# Patient Record
Sex: FEMALE | Race: WHITE | NOT HISPANIC OR LATINO | Employment: OTHER | URBAN - METROPOLITAN AREA
[De-identification: names, ages, dates, MRNs, and addresses within clinical notes are randomized per-mention and may not be internally consistent; named-entity substitution may affect disease eponyms.]

---

## 2021-05-06 LAB
APPEARANCE: CLEAR
BILIRUBIN, URINE: NEGATIVE
BLOOD, URINE: ABNORMAL
COLOR, URINE: YELLOW
GLUCOSE, URINE: ABNORMAL MG/DL
KETONES, URINE: ABNORMAL MG/DL
LEUKOCYTE ESTERASE, URINE: NEGATIVE
NITRITE, URINE: NEGATIVE
PH UA: 5 (ref 5–8)
PROTEIN UA: ABNORMAL MG/DL
RBC URINE: ABNORMAL /HPF (ref 0–5)
SPECIFIC GRAVITY, URINE: 1.02 (ref 1–1)
SPECIMEN SOURCE: ABNORMAL
SPECIMEN SOURCE: ABNORMAL
TROPONIN I: 0.14 NG/ML (ref 0–0)
TROPONIN I: 0.24 NG/ML (ref 0–0)
UROBILINOGEN, URINE: <2 MG/DL (ref 0–1.9)
WBC URINE: ABNORMAL /HPF (ref 0–5)

## 2021-05-10 LAB
ANION GAP SERPL CALCULATED.3IONS-SCNC: 11 MMOL/L (ref 10–20)
BICARBONATE: 23 MMOL/L (ref 21–32)
CALCIUM SERPL-MCNC: 8.3 MG/DL (ref 8.6–10.3)
CHLORIDE BLD-SCNC: 106 MMOL/L (ref 98–107)
CREAT SERPL-MCNC: 1.88 MG/DL (ref 0.5–1)
GFR AFRICAN AMERICAN: 30 ML/MIN/1.73M2
GFR NON-AFRICAN AMERICAN: 25 ML/MIN/1.73M2
GLUCOSE: 157 MG/DL (ref 74–99)
POTASSIUM SERPL-SCNC: 4.2 MMOL/L (ref 3.5–5.3)
SODIUM BLD-SCNC: 136 MMOL/L (ref 136–145)
UREA NITROGEN: 38 MG/DL (ref 6–23)

## 2023-07-06 RX ORDER — DILTIAZEM HYDROCHLORIDE 120 MG/1
CAPSULE, COATED, EXTENDED RELEASE ORAL
Qty: 90 CAPSULE | Refills: 0 | OUTPATIENT
Start: 2023-07-06

## 2024-01-04 DIAGNOSIS — I48.91 ATRIAL FIBRILLATION, UNSPECIFIED TYPE (MULTI): ICD-10-CM

## 2024-01-04 RX ORDER — ROSUVASTATIN CALCIUM 10 MG/1
10 TABLET, COATED ORAL DAILY
COMMUNITY
Start: 2021-10-06 | End: 2024-02-06 | Stop reason: WASHOUT

## 2024-01-04 RX ORDER — ACETAMINOPHEN 500 MG
2000 TABLET ORAL 2 TIMES DAILY
COMMUNITY
Start: 2019-08-05 | End: 2024-02-06 | Stop reason: WASHOUT

## 2024-01-04 RX ORDER — FOLIC ACID 20 MG
CAPSULE ORAL
COMMUNITY
End: 2024-02-06 | Stop reason: WASHOUT

## 2024-01-04 RX ORDER — FUROSEMIDE 40 MG/1
40 TABLET ORAL DAILY
COMMUNITY
End: 2024-02-06 | Stop reason: WASHOUT

## 2024-01-04 RX ORDER — DORZOLAMIDE HYDROCHLORIDE AND TIMOLOL MALEATE 20; 5 MG/ML; MG/ML
1 SOLUTION/ DROPS OPHTHALMIC 2 TIMES DAILY
COMMUNITY

## 2024-01-04 RX ORDER — CYCLOBENZAPRINE HCL 5 MG
5 TABLET ORAL 2 TIMES DAILY PRN
COMMUNITY
Start: 2023-05-23 | End: 2024-02-06 | Stop reason: WASHOUT

## 2024-01-04 RX ORDER — RIVAROXABAN 10 MG/1
10 TABLET, FILM COATED ORAL DAILY
COMMUNITY
End: 2024-01-04 | Stop reason: SDUPTHER

## 2024-01-04 RX ORDER — INSULIN ASPART 100 [IU]/ML
INJECTION, SUSPENSION SUBCUTANEOUS
COMMUNITY
Start: 2016-11-09

## 2024-01-04 RX ORDER — BRIMONIDINE TARTRATE 2 MG/ML
1 SOLUTION/ DROPS OPHTHALMIC 2 TIMES DAILY
COMMUNITY

## 2024-01-04 RX ORDER — LATANOPROST 50 UG/ML
SOLUTION/ DROPS OPHTHALMIC
COMMUNITY
Start: 2016-02-08

## 2024-01-04 RX ORDER — CLOPIDOGREL BISULFATE 75 MG/1
1 TABLET ORAL DAILY
COMMUNITY
End: 2024-02-06 | Stop reason: WASHOUT

## 2024-01-04 RX ORDER — RIVAROXABAN 10 MG/1
10 TABLET, FILM COATED ORAL DAILY
Qty: 90 TABLET | Refills: 3 | Status: SHIPPED | OUTPATIENT
Start: 2024-01-04

## 2024-01-04 RX ORDER — AMIODARONE HYDROCHLORIDE 200 MG/1
200 TABLET ORAL DAILY
COMMUNITY
End: 2024-02-06 | Stop reason: WASHOUT

## 2024-01-04 RX ORDER — LOSARTAN POTASSIUM 50 MG/1
50 TABLET ORAL DAILY
COMMUNITY
Start: 2023-11-05 | End: 2024-02-06 | Stop reason: WASHOUT

## 2024-01-04 RX ORDER — DILTIAZEM HYDROCHLORIDE 120 MG/1
120 CAPSULE, COATED, EXTENDED RELEASE ORAL DAILY
COMMUNITY
End: 2024-02-06 | Stop reason: WASHOUT

## 2024-01-04 NOTE — TELEPHONE ENCOUNTER
Last seen 02/07/23. Medication last renewed on 01/06/23 with a year supply. Upcoming appointment on 02/06/24.

## 2024-01-09 PROBLEM — I25.10 CAD (CORONARY ARTERY DISEASE): Status: ACTIVE | Noted: 2024-01-09

## 2024-01-09 PROBLEM — R60.0 BILATERAL EDEMA OF LOWER EXTREMITY: Status: ACTIVE | Noted: 2024-01-09

## 2024-01-09 PROBLEM — H31.411: Status: ACTIVE | Noted: 2024-01-09

## 2024-01-09 PROBLEM — I42.9 CARDIOMYOPATHY (MULTI): Status: ACTIVE | Noted: 2024-01-09

## 2024-01-09 PROBLEM — D64.9 ANEMIA: Status: ACTIVE | Noted: 2024-01-09

## 2024-01-09 PROBLEM — R26.9 ABNORMALITY OF GAIT AND MOBILITY: Status: ACTIVE | Noted: 2024-01-09

## 2024-01-09 PROBLEM — I50.32 CHRONIC DIASTOLIC HEART FAILURE (MULTI): Status: ACTIVE | Noted: 2024-01-09

## 2024-01-09 PROBLEM — C44.310 BASAL CELL CARCINOMA (BCC) OF FACE: Status: ACTIVE | Noted: 2024-01-09

## 2024-01-09 PROBLEM — D04.39 CARCINOMA IN SITU OF SKIN OF OTHER PARTS OF FACE: Status: ACTIVE | Noted: 2020-04-28

## 2024-01-09 PROBLEM — R63.5 ABNORMAL WEIGHT GAIN: Status: ACTIVE | Noted: 2024-01-09

## 2024-02-01 PROBLEM — R00.1 SINUS BRADYCARDIA: Status: ACTIVE | Noted: 2024-02-01

## 2024-02-01 PROBLEM — D48.5 NEOPLASM OF UNCERTAIN BEHAVIOR OF SKIN: Status: ACTIVE | Noted: 2020-04-28

## 2024-02-01 PROBLEM — Z98.83 STATUS POST GLAUCOMA SURGERY: Status: ACTIVE | Noted: 2024-02-01

## 2024-02-01 PROBLEM — R33.9 URINARY RETENTION: Status: ACTIVE | Noted: 2024-02-01

## 2024-02-01 PROBLEM — E78.5 DYSLIPIDEMIA: Status: ACTIVE | Noted: 2024-02-01

## 2024-02-01 PROBLEM — R60.9 DEPENDENT EDEMA: Status: ACTIVE | Noted: 2024-02-01

## 2024-02-01 PROBLEM — H40.1131 PRIMARY OPEN ANGLE GLAUCOMA OF BOTH EYES, MILD STAGE: Status: ACTIVE | Noted: 2024-02-01

## 2024-02-01 PROBLEM — E11.9 DIABETES MELLITUS (MULTI): Status: ACTIVE | Noted: 2024-02-01

## 2024-02-01 PROBLEM — I73.9 PAD (PERIPHERAL ARTERY DISEASE) (CMS-HCC): Status: ACTIVE | Noted: 2024-02-01

## 2024-02-01 PROBLEM — Z98.890 HISTORY OF TRABECULECTOMY: Status: ACTIVE | Noted: 2024-02-01

## 2024-02-01 PROBLEM — R53.83 FATIGUE: Status: ACTIVE | Noted: 2024-02-01

## 2024-02-01 PROBLEM — I48.91 ATRIAL FIBRILLATION (MULTI): Status: ACTIVE | Noted: 2024-02-01

## 2024-02-01 PROBLEM — R79.89 LOW VITAMIN D LEVEL: Status: ACTIVE | Noted: 2024-02-01

## 2024-02-01 PROBLEM — I25.2 HISTORY OF NON-ST ELEVATION MYOCARDIAL INFARCTION (NSTEMI): Status: ACTIVE | Noted: 2024-02-01

## 2024-02-01 PROBLEM — L57.9 SKIN CHANGES DUE TO CHRONIC EXPOSURE TO NONIONIZING RADIATION, UNSPECIFIED: Status: ACTIVE | Noted: 2020-04-28

## 2024-02-01 PROBLEM — E87.6 HYPOKALEMIA: Status: ACTIVE | Noted: 2024-02-01

## 2024-02-01 PROBLEM — L89.606 PRESSURE INJURY OF DEEP TISSUE OF HEEL: Status: ACTIVE | Noted: 2024-02-01

## 2024-02-01 PROBLEM — S72.001A HIP FRACTURE, RIGHT (MULTI): Status: ACTIVE | Noted: 2024-02-01

## 2024-02-01 PROBLEM — M47.816 OSTEOARTHRITIS OF LUMBAR SPINE: Status: ACTIVE | Noted: 2024-02-01

## 2024-02-01 PROBLEM — N18.4 STAGE 4 CHRONIC KIDNEY DISEASE (MULTI): Status: ACTIVE | Noted: 2024-02-01

## 2024-02-01 PROBLEM — I70.229 CRITICAL LOWER LIMB ISCHEMIA (MULTI): Status: ACTIVE | Noted: 2024-02-01

## 2024-02-01 PROBLEM — N95.0 POSTMENOPAUSAL VAGINAL BLEEDING: Status: ACTIVE | Noted: 2024-02-01

## 2024-02-01 PROBLEM — T07.XXXA MULTIPLE FRACTURES: Status: ACTIVE | Noted: 2024-02-01

## 2024-02-06 ENCOUNTER — OFFICE VISIT (OUTPATIENT)
Dept: CARDIOLOGY | Facility: CLINIC | Age: 89
End: 2024-02-06
Payer: MEDICARE

## 2024-02-06 VITALS
RESPIRATION RATE: 18 BRPM | OXYGEN SATURATION: 98 % | DIASTOLIC BLOOD PRESSURE: 66 MMHG | HEART RATE: 71 BPM | SYSTOLIC BLOOD PRESSURE: 116 MMHG | HEIGHT: 60 IN | BODY MASS INDEX: 25.78 KG/M2

## 2024-02-06 DIAGNOSIS — I25.10 CORONARY ARTERY DISEASE INVOLVING NATIVE CORONARY ARTERY OF NATIVE HEART WITHOUT ANGINA PECTORIS: ICD-10-CM

## 2024-02-06 DIAGNOSIS — I48.91 ATRIAL FIBRILLATION, UNSPECIFIED TYPE (MULTI): Primary | ICD-10-CM

## 2024-02-06 PROBLEM — S82.63XA CLOSED FRACTURE OF LATERAL MALLEOLUS: Status: ACTIVE | Noted: 2020-09-17

## 2024-02-06 PROBLEM — Z96.641 PRESENCE OF RIGHT ARTIFICIAL HIP JOINT: Status: ACTIVE | Noted: 2021-05-19

## 2024-02-06 PROCEDURE — 93000 ELECTROCARDIOGRAM COMPLETE: CPT | Performed by: NURSE PRACTITIONER

## 2024-02-06 PROCEDURE — 1159F MED LIST DOCD IN RCRD: CPT | Performed by: NURSE PRACTITIONER

## 2024-02-06 PROCEDURE — 99214 OFFICE O/P EST MOD 30 MIN: CPT | Performed by: NURSE PRACTITIONER

## 2024-02-06 PROCEDURE — 1160F RVW MEDS BY RX/DR IN RCRD: CPT | Performed by: NURSE PRACTITIONER

## 2024-02-06 PROCEDURE — 3074F SYST BP LT 130 MM HG: CPT | Performed by: NURSE PRACTITIONER

## 2024-02-06 PROCEDURE — 1036F TOBACCO NON-USER: CPT | Performed by: NURSE PRACTITIONER

## 2024-02-06 PROCEDURE — 3078F DIAST BP <80 MM HG: CPT | Performed by: NURSE PRACTITIONER

## 2024-02-06 RX ORDER — ASPIRIN 81 MG/1
81 TABLET ORAL DAILY
COMMUNITY
End: 2024-02-06 | Stop reason: WASHOUT

## 2024-02-06 RX ORDER — HYDROCODONE BITARTRATE AND ACETAMINOPHEN 5; 325 MG/1; MG/1
1 TABLET ORAL EVERY 8 HOURS PRN
COMMUNITY
End: 2024-02-06 | Stop reason: WASHOUT

## 2024-02-06 RX ORDER — SELENIUM 50 MCG
1 TABLET ORAL DAILY
COMMUNITY

## 2024-02-06 RX ORDER — ASCORBIC ACID 500 MG
TABLET ORAL
COMMUNITY
End: 2024-02-06 | Stop reason: WASHOUT

## 2024-02-06 RX ORDER — IRON POLYSACCHARIDE COMPLEX 150 MG
150 CAPSULE ORAL DAILY
COMMUNITY

## 2024-02-06 NOTE — PROGRESS NOTES
Name : Dena Short   : 1931   MRN : [id number]   ENC Date : 2024    CC: Annual Exam, Coronary Artery Disease, Atrial Fibrillation, and DLD     HPI:    Mrs. Short is a functional 89 y.o.  female with PMHx sig for CAD, pAfib on DOAC, PAD s/p stenting Rt SFA-popliteal artery & angioplasty rt PT & peroneal arteries (2021), DLD & DM who presents in a wheelchair today for annual cardiovascular follow up.      She has done very well since her last visit. Reports no major health issues and has had no hospitalizations. Still lives at home by herself. Neighbors help her. She is only taking her Xarelto & eye drops. She is sedentary. Denies any chest pain, pressure, SOB/LOPEZ, PND, orthopnea, LE edema, palpitations, lightheadedness, dizziness, or syncope.      Clinical Course:  Early 2018 was hospitalized for chest pain and was ruled in for non-STEMI. In that setting, she was found to be in A. fib with RVR and an echocardiogram documented LVEF of 30-35%. Lexiscan stress test was performed showing no evidence of ischemia though there was anteriolateral scar. LVEF was estimated to be 28% on the Lexiscan. She had no clinical heart failure. Her rate was controlled by metoprolol and diltiazem. She was started on Xarelto 15 mg by mouth daily. She had successful cardioversion later and was subsequently maintained on amiodarone 200 mg by mouth once daily     In 2021, she had a fall at her facility and few days later, one of her right toes turned bluish. She was evaluated at [facility] and had stenting of her right SFA/popliteal artery along with angioplasty of the right posterior tibial and peroneal arteries.     ROS: unless otherwise noted in the history of present illness, all other systems were reviewed and they are negative for complaints     Allergies:  Patient has no known allergies.    Current Outpatient Medications   Medication Instructions    brimonidine (AlphaGAN) 0.2 %  ophthalmic solution 1 drop, Left Eye, 2 times daily    dorzolamide-timoloL (Cosopt) 22.3-6.8 mg/mL ophthalmic solution 1 drop, Left Eye, 2 times daily    insulin asp prt-insulin aspart (NovoLOG Mix 70-30 U-100 Insuln) 100 unit/mL (70-30) injection subcutaneous    iron polysaccharides (NU-IRON,NIFEREX) 150 mg, oral, Daily    lactobacillus acidophilus capsule 1 capsule, oral, Daily    latanoprost (Xalatan) 0.005 % ophthalmic solution ophthalmic (eye)    Xarelto 10 mg, oral, Daily        Last Labs:  CBC  Lab Results   Component Value Date    WBC 6.5 01/04/2022    HGB 10.9 (L) 01/04/2022    HCT 36.2 01/04/2022    MCV 92 01/04/2022     01/04/2022       CMP  Lab Results   Component Value Date    CALCIUM 9.2 01/04/2022    PHOS 3.6 01/04/2022    PROT 5.9 (L) 05/06/2021    ALBUMIN 3.5 01/04/2022    AST 39 05/06/2021    ALT 45 05/06/2021    ALKPHOS 68 05/06/2021    BILITOT 1.6 (H) 05/06/2021       BMP   Lab Results   Component Value Date     01/04/2022    K 4.0 01/04/2022     01/04/2022    CO2 29 01/04/2022    GLUCOSE 164 (H) 01/04/2022    BUN 32 (H) 01/04/2022    CREATININE 1.88 (H) 01/04/2022       LIPID PANEL   Lab Results   Component Value Date    CHOL 127 05/15/2020    TRIG 142 05/15/2020    HDL 57.6 05/15/2020    CHHDL 2.2 05/15/2020    LDLF 41 05/15/2020    VLDL 28 05/15/2020       RENAL FUNCTION PANEL   Lab Results   Component Value Date    GLUCOSE 164 (H) 01/04/2022     01/04/2022    K 4.0 01/04/2022     01/04/2022    CO2 29 01/04/2022    ANIONGAP 13 01/04/2022    BUN 32 (H) 01/04/2022    CREATININE 1.88 (H) 01/04/2022    CALCIUM 9.2 01/04/2022    PHOS 3.6 01/04/2022    ALBUMIN 3.5 01/04/2022        Lab Results   Component Value Date     (H) 10/06/2020    HGBA1C 6.6 05/15/2020       Last Recorded Vitals:  Vitals:    02/06/24 1344   BP: 116/66   BP Location: Left arm   Patient Position: Sitting   Pulse: 71   Resp: 18   SpO2: 98%   Height: 1.524 m (5')     Physical Exam:  On exam  Ms. Short appears her stated age, is alert and oriented x3, and in no acute distress. Her sclera are anicteric and her oropharynx has moist mucous membranes. Her neck is supple and without thyromegaly. The JVP is ~5 cm of water above the right atrium. Her cardiac exam has regular rhythm, normal S1, S2. No S3/4. There are no murmurs. Her lungs are clear to auscultation bilaterally and there is no dullness to percussion. Her abdomen is soft, nontender with normoactive bowel sounds. There is no HJR. The extremities are warm and without edema. The skin is dry. There is no rash present. The distal pulses are 2-3+ in all four extremities. Her mood and affect are appropriate for todays encounter.     Assessment/Plan:  1. Paroxysmal AFib s/p DCCV (4 shocks) in 2018. Remains asymptomatic with no embolic events. EKG today showed sinus rhythm  - c/w Xarelto 10mg every day  - she hasn't taken any of her other cardiac medications in over a month. Sometimes she'll take them & then she doesn't again    2. PAD s/p stenting Rt SFA-popliteal artery & angioplasty rt PT & peroneal arteries (12/2021, By Dr. Gino Aiken). States she is only following with Dr. Griffin now. Per Dr. Aiken's cath report, plan was for at least 6 months of Plavix/Xarelto (10mg) or until wound is healed. Then convert to ASA indefinitely & Xarelto 10mg for VTE prevention.  - she is only taking Xarelto     3. Anticoagulation, long term. No embolic events nor issues with bleeding.  - counseled on the bleeding risk with DOAC and how to minimize it.      4. Mechanical Falls. None reported this past year. Will need to continually assess risk vs benefit of DOAC if she continues to have falls.     5. Cardiomyopathy.   This was tachy-mediated. recovered on subsequent echocardiogram in August 2018. Last Echo May 2021 with LVEF 55-60%. She has no symptoms of heart failure.     6. Prior non-STEMI.   But with negative stress test for ischemia 2018, and remains without any anginal  symptoms since.     7. CAD. Lexiscan stress test done in 2018 showing no evidence of ischemia though there was anteriolateral scar.  - self d/c'd medications     8. Hypertension. Well controlled     Follow up 1 year    Tracy M Schwab, MONA-CNP

## 2024-02-14 ENCOUNTER — LAB (OUTPATIENT)
Dept: LAB | Facility: LAB | Age: 89
End: 2024-02-14
Payer: MEDICARE

## 2024-02-14 DIAGNOSIS — I48.91 ATRIAL FIBRILLATION, UNSPECIFIED TYPE (MULTI): ICD-10-CM

## 2024-02-14 DIAGNOSIS — I25.10 CORONARY ARTERY DISEASE INVOLVING NATIVE CORONARY ARTERY OF NATIVE HEART WITHOUT ANGINA PECTORIS: ICD-10-CM

## 2024-02-14 LAB
ALBUMIN SERPL BCP-MCNC: 3.8 G/DL (ref 3.4–5)
ALP SERPL-CCNC: 76 U/L (ref 33–136)
ALT SERPL W P-5'-P-CCNC: 8 U/L (ref 7–45)
ANION GAP SERPL CALC-SCNC: 10 MMOL/L (ref 10–20)
AST SERPL W P-5'-P-CCNC: 14 U/L (ref 9–39)
BILIRUB SERPL-MCNC: 0.9 MG/DL (ref 0–1.2)
BUN SERPL-MCNC: 25 MG/DL (ref 6–23)
CALCIUM SERPL-MCNC: 9.3 MG/DL (ref 8.6–10.3)
CHLORIDE SERPL-SCNC: 108 MMOL/L (ref 98–107)
CO2 SERPL-SCNC: 28 MMOL/L (ref 21–32)
CREAT SERPL-MCNC: 1.42 MG/DL (ref 0.5–1.05)
EGFRCR SERPLBLD CKD-EPI 2021: 35 ML/MIN/1.73M*2
ERYTHROCYTE [DISTWIDTH] IN BLOOD BY AUTOMATED COUNT: 16.1 % (ref 11.5–14.5)
GLUCOSE SERPL-MCNC: 60 MG/DL (ref 74–99)
HCT VFR BLD AUTO: 37.4 % (ref 36–46)
HGB BLD-MCNC: 11.4 G/DL (ref 12–16)
MAGNESIUM SERPL-MCNC: 2.03 MG/DL (ref 1.6–2.4)
MCH RBC QN AUTO: 26.3 PG (ref 26–34)
MCHC RBC AUTO-ENTMCNC: 30.5 G/DL (ref 32–36)
MCV RBC AUTO: 86 FL (ref 80–100)
NRBC BLD-RTO: 0 /100 WBCS (ref 0–0)
PLATELET # BLD AUTO: 287 X10*3/UL (ref 150–450)
POTASSIUM SERPL-SCNC: 4.4 MMOL/L (ref 3.5–5.3)
PROT SERPL-MCNC: 6.3 G/DL (ref 6.4–8.2)
RBC # BLD AUTO: 4.33 X10*6/UL (ref 4–5.2)
SODIUM SERPL-SCNC: 142 MMOL/L (ref 136–145)
TSH SERPL-ACNC: 2.15 MIU/L (ref 0.44–3.98)
WBC # BLD AUTO: 7.6 X10*3/UL (ref 4.4–11.3)

## 2024-02-14 PROCEDURE — 80053 COMPREHEN METABOLIC PANEL: CPT

## 2024-02-14 PROCEDURE — 84443 ASSAY THYROID STIM HORMONE: CPT

## 2024-02-14 PROCEDURE — 85027 COMPLETE CBC AUTOMATED: CPT

## 2024-02-14 PROCEDURE — 83735 ASSAY OF MAGNESIUM: CPT

## 2024-02-14 PROCEDURE — 36415 COLL VENOUS BLD VENIPUNCTURE: CPT

## 2024-04-06 DIAGNOSIS — I25.2 H/O NON-ST ELEVATION MYOCARDIAL INFARCTION (NSTEMI): ICD-10-CM

## 2024-04-08 RX ORDER — LOSARTAN POTASSIUM 50 MG/1
50 TABLET ORAL DAILY
Qty: 90 TABLET | Refills: 0 | Status: SHIPPED | OUTPATIENT
Start: 2024-04-08

## 2024-04-08 NOTE — TELEPHONE ENCOUNTER
Patient was last seen on 02/06/24. Medication last renewed on 02/2023 w/ a year supply. This medication was not on the list when you last saw her. Next apt. On 02/04/25.

## 2024-05-15 ENCOUNTER — HOSPITAL ENCOUNTER (EMERGENCY)
Facility: HOSPITAL | Age: 89
Discharge: HOME | End: 2024-05-15
Payer: MEDICARE

## 2024-05-15 VITALS
RESPIRATION RATE: 18 BRPM | SYSTOLIC BLOOD PRESSURE: 180 MMHG | OXYGEN SATURATION: 98 % | DIASTOLIC BLOOD PRESSURE: 81 MMHG | HEIGHT: 61 IN | HEART RATE: 55 BPM | WEIGHT: 140 LBS | BODY MASS INDEX: 26.43 KG/M2 | TEMPERATURE: 98.6 F

## 2024-05-15 DIAGNOSIS — S51.812A LACERATION OF SKIN OF LEFT FOREARM, INITIAL ENCOUNTER: Primary | ICD-10-CM

## 2024-05-15 PROCEDURE — 99283 EMERGENCY DEPT VISIT LOW MDM: CPT | Mod: 25

## 2024-05-15 PROCEDURE — 90715 TDAP VACCINE 7 YRS/> IM: CPT

## 2024-05-15 PROCEDURE — 2500000004 HC RX 250 GENERAL PHARMACY W/ HCPCS (ALT 636 FOR OP/ED)

## 2024-05-15 PROCEDURE — 99284 EMERGENCY DEPT VISIT MOD MDM: CPT | Performed by: EMERGENCY MEDICINE

## 2024-05-15 PROCEDURE — 90471 IMMUNIZATION ADMIN: CPT

## 2024-05-15 RX ORDER — AMOXICILLIN AND CLAVULANATE POTASSIUM 875; 125 MG/1; MG/1
1 TABLET, FILM COATED ORAL EVERY 12 HOURS
Qty: 10 TABLET | Refills: 0 | Status: SHIPPED | OUTPATIENT
Start: 2024-05-15 | End: 2024-05-20

## 2024-05-15 RX ADMIN — TETANUS TOXOID, REDUCED DIPHTHERIA TOXOID AND ACELLULAR PERTUSSIS VACCINE, ADSORBED 0.5 ML: 5; 2.5; 8; 8; 2.5 SUSPENSION INTRAMUSCULAR at 16:34

## 2024-05-15 ASSESSMENT — PAIN SCALES - GENERAL
PAINLEVEL_OUTOF10: 2
PAINLEVEL_OUTOF10: 0 - NO PAIN

## 2024-05-15 ASSESSMENT — LIFESTYLE VARIABLES
EVER HAD A DRINK FIRST THING IN THE MORNING TO STEADY YOUR NERVES TO GET RID OF A HANGOVER: NO
EVER FELT BAD OR GUILTY ABOUT YOUR DRINKING: NO
TOTAL SCORE: 0
HAVE PEOPLE ANNOYED YOU BY CRITICIZING YOUR DRINKING: NO
HAVE YOU EVER FELT YOU SHOULD CUT DOWN ON YOUR DRINKING: NO

## 2024-05-15 ASSESSMENT — PAIN - FUNCTIONAL ASSESSMENT: PAIN_FUNCTIONAL_ASSESSMENT: 0-10

## 2024-05-15 ASSESSMENT — COLUMBIA-SUICIDE SEVERITY RATING SCALE - C-SSRS
1. IN THE PAST MONTH, HAVE YOU WISHED YOU WERE DEAD OR WISHED YOU COULD GO TO SLEEP AND NOT WAKE UP?: NO
2. HAVE YOU ACTUALLY HAD ANY THOUGHTS OF KILLING YOURSELF?: NO
6. HAVE YOU EVER DONE ANYTHING, STARTED TO DO ANYTHING, OR PREPARED TO DO ANYTHING TO END YOUR LIFE?: NO

## 2024-05-15 NOTE — DISCHARGE INSTRUCTIONS
Please return to closest ED if you develop any worsening fever, chills, excessive bleeding from your wound, pus drainage from the wound, or any other worsening symptoms.  Please follow-up with your PCP outpatient.

## 2024-05-15 NOTE — ED PROVIDER NOTES
HPI   Chief Complaint   Patient presents with    Wound Check     Left FA. Fall 4-5 days ago. Reports hit on sink        ED, CKD 4, A-fib, PAD on Xarelto presenting [facility] for cut injury to her left mid forearm.  Patient reports that she struck it against a sink 4 to 5 days ago.  Patient reports that she has intermittent bleeding.  Patient has not stopped her Xarelto at any point in the day since injury.  Patient denies any fevers, chills, nausea, vomiting, diarrhea, abdominal pain, headache, change in vision, pus drainage, pulsatile/excessive bleeding from skin wound.                          Xochilt Coma Scale Score: 15                     Patient History   Past Medical History:   Diagnosis Date    Personal history of other diseases of the circulatory system     History of atrial fibrillation    Personal history of other diseases of the circulatory system     History of cardiomyopathy    Personal history of other diseases of the circulatory system 04/09/2018    History of cardiomyopathy     Past Surgical History:   Procedure Laterality Date    CHOLECYSTECTOMY  09/14/2018    Cholecystectomy    OTHER SURGICAL HISTORY  06/14/2021    Hip replacement    OTHER SURGICAL HISTORY  06/14/2021    Hip replacement    OTHER SURGICAL HISTORY  06/14/2021    Hip replacement    OTHER SURGICAL HISTORY  01/27/2022    Arterial angioplasty of lower extremity    OTHER SURGICAL HISTORY  09/14/2018    Previous Stent Placement     No family history on file.  Social History     Tobacco Use    Smoking status: Never    Smokeless tobacco: Never   Substance Use Topics    Alcohol use: Never    Drug use: Never       Physical Exam   ED Triage Vitals   Temperature Heart Rate Respirations BP   05/15/24 1542 05/15/24 1546 05/15/24 1546 05/15/24 1546   37 °C (98.6 °F) 58 16 (!) 191/85      Pulse Ox Temp Source Heart Rate Source Patient Position   05/15/24 1546 05/15/24 1542 05/15/24 1542 05/15/24 1546   96 % Temporal Monitor Sitting      BP  Location FiO2 (%)     05/15/24 1546 --     Right arm        Physical Exam  Constitutional:       Appearance: Normal appearance. She is normal weight.   HENT:      Head: Normocephalic and atraumatic.      Nose: Nose normal.      Mouth/Throat:      Mouth: Mucous membranes are moist.      Pharynx: Oropharynx is clear.   Eyes:      Extraocular Movements: Extraocular movements intact.      Conjunctiva/sclera: Conjunctivae normal.      Pupils: Pupils are equal, round, and reactive to light.   Cardiovascular:      Rate and Rhythm: Normal rate and regular rhythm.      Pulses: Normal pulses.      Heart sounds: Normal heart sounds.   Pulmonary:      Effort: Pulmonary effort is normal.      Breath sounds: Normal breath sounds.   Abdominal:      General: Abdomen is flat. Bowel sounds are normal.      Palpations: Abdomen is soft.   Musculoskeletal:         General: Normal range of motion.      Cervical back: Normal range of motion and neck supple.   Skin:     General: Skin is warm and dry.      Capillary Refill: Capillary refill takes less than 2 seconds.      Comments: 1 inch x 1 inch annular superficial laceration to the left mid posterior forearm.   Neurological:      General: No focal deficit present.      Mental Status: She is alert and oriented to person, place, and time. Mental status is at baseline.   Psychiatric:         Mood and Affect: Mood normal.         Behavior: Behavior normal.         ED Course & MDM   Diagnoses as of 05/15/24 1623   Laceration of skin of left forearm, initial encounter       Medical Decision Making  Patient is a 89 y.o. female who presents to [facility] ED for Wound Check (Left FA. Fall 4-5 days ago. Reports hit on sink ). On initial ED evaluation, patient found to be in no acute distress. Per HPI, concern to evaluate and treat for superficial skin laceration of the left forearm.  Patient left forearm disinfected and dressed appropriately.  Patient advised to starting antibiotics prescribed for  prophylaxis.  Patient to follow-up with outpatient plastics for any worsening of laceration healing.  Advised patient on signs/symptoms regarding worsening skin healing, signs of infection, fever, chills, or other worsening symptoms.  Patient tetanus updated today.    Rx given for Augmentin. Patient to follow up with PCP and plastic surgery outpatient. Anticipatory guidance and return precautions provided.  Patient otherwise stable for discharge.          Procedure  Procedures     Sonali Farr MD  Resident  05/15/24 [id number]

## 2024-05-30 ENCOUNTER — TELEPHONE (OUTPATIENT)
Dept: CARDIOLOGY | Facility: CLINIC | Age: 89
End: 2024-05-30
Payer: MEDICARE

## 2024-05-31 NOTE — TELEPHONE ENCOUNTER
Spoke with Ms Short's visiting CNP Shari. Dena's heart rate on pulse ox monitor was jumping from 30-40s. Dena's heart rate was in the 70s in office in February. At that time she wasn't taking any cardiac medications as she forgot to take them for over a month.    Today, the CNP tells me that she is taking Cardizem 120mg every day, Losartan 50mg every day, Lasix 40mg every day & Amiodarone 200mg every day.    I advised the Dena be taken to the hospital for further evaluation. I am not sure what or how much of what she has been taking & needs a thorough work up.  ----- Message from Jigna Liz MA sent at 5/30/2024  3:14 PM EDT -----  Regarding: Low heart rate  Nurse called to let you know patients HR was low 30s.

## 2024-06-03 ENCOUNTER — TELEPHONE (OUTPATIENT)
Dept: CARDIOLOGY | Facility: CLINIC | Age: 89
End: 2024-06-03
Payer: MEDICARE

## 2024-06-03 NOTE — TELEPHONE ENCOUNTER
Shari MADRIGAL called to advise patients heart rate is staying within the 30's and 40's she does have an appt on Wed 6/5/24 at 4pm

## 2024-06-04 RX ORDER — AMIODARONE HYDROCHLORIDE 200 MG/1
200 TABLET ORAL DAILY
COMMUNITY
Start: 2024-02-27

## 2024-06-04 RX ORDER — DILTIAZEM HYDROCHLORIDE 120 MG/1
120 CAPSULE, EXTENDED RELEASE ORAL DAILY
COMMUNITY
Start: 2024-05-22

## 2024-06-04 RX ORDER — FUROSEMIDE 40 MG/1
40 TABLET ORAL DAILY
COMMUNITY
Start: 2024-04-07

## 2024-06-05 ENCOUNTER — OFFICE VISIT (OUTPATIENT)
Dept: CARDIOLOGY | Facility: CLINIC | Age: 89
End: 2024-06-05
Payer: MEDICARE

## 2024-06-05 VITALS
SYSTOLIC BLOOD PRESSURE: 140 MMHG | HEART RATE: 63 BPM | DIASTOLIC BLOOD PRESSURE: 90 MMHG | RESPIRATION RATE: 18 BRPM | BODY MASS INDEX: 26.45 KG/M2 | OXYGEN SATURATION: 97 % | HEIGHT: 61 IN

## 2024-06-05 DIAGNOSIS — R00.1 SLOW HEART RATE: ICD-10-CM

## 2024-06-05 DIAGNOSIS — I49.9 CARDIAC ARRHYTHMIA, UNSPECIFIED CARDIAC ARRHYTHMIA TYPE: Primary | ICD-10-CM

## 2024-06-05 PROCEDURE — 3080F DIAST BP >= 90 MM HG: CPT | Performed by: NURSE PRACTITIONER

## 2024-06-05 PROCEDURE — 99214 OFFICE O/P EST MOD 30 MIN: CPT | Performed by: NURSE PRACTITIONER

## 2024-06-05 PROCEDURE — 93000 ELECTROCARDIOGRAM COMPLETE: CPT | Performed by: NURSE PRACTITIONER

## 2024-06-05 PROCEDURE — 1160F RVW MEDS BY RX/DR IN RCRD: CPT | Performed by: NURSE PRACTITIONER

## 2024-06-05 PROCEDURE — 3077F SYST BP >= 140 MM HG: CPT | Performed by: NURSE PRACTITIONER

## 2024-06-05 PROCEDURE — 1159F MED LIST DOCD IN RCRD: CPT | Performed by: NURSE PRACTITIONER

## 2024-06-05 PROCEDURE — 1036F TOBACCO NON-USER: CPT | Performed by: NURSE PRACTITIONER

## 2024-06-05 RX ORDER — DOCUSATE SODIUM 100 MG/1
CAPSULE, LIQUID FILLED ORAL
COMMUNITY
Start: 2024-06-01

## 2024-06-05 NOTE — PROGRESS NOTES
Name : Dena Short   : 1931   MRN : [id number]   ENC Date : 2024    CC: abnormal rhythm, low heart rate     HPI:    Mrs. Short is a functional 89 y.o.  female with PMHx sig for CAD, pAfib on DOAC, PAD s/p stenting Rt SFA-popliteal artery & angioplasty rt PT & peroneal arteries (2021), DLD & DM who presents in a wheelchair with her Friend Donna today as above.     She has done very well since her last visit. Reports no major health issues and has had no hospitalizations. Still lives at home by herself. Neighbors help her. She is only taking her Xarelto & eye drops. She is sedentary. Denies any chest pain, pressure, SOB/LOPEZ, PND, orthopnea, LE edema, palpitations, lightheadedness, dizziness, or syncope.      Clinical Course:  Early 2018 was hospitalized for chest pain and was ruled in for non-STEMI. In that setting, she was found to be in A. fib with RVR and an echocardiogram documented LVEF of 30-35%. Lexiscan stress test was performed showing no evidence of ischemia though there was anteriolateral scar. LVEF was estimated to be 28% on the Lexiscan. She had no clinical heart failure. Her rate was controlled by metoprolol and diltiazem. She was started on Xarelto 15 mg by mouth daily. She had successful cardioversion later and was subsequently maintained on amiodarone 200 mg by mouth once daily     In 2021, she had a fall at her facility and few days later, one of her right toes turned bluish. She was evaluated at [facility] and had stenting of her right SFA/popliteal artery along with angioplasty of the right posterior tibial and peroneal arteries.     I saw Dena in February. She reported that she hadn't taken any of her other cardiac medications in over a month. Sometimes she'll take them & then she doesn't again. At that time she was doing well.    Dena's home health NP contacted me 24 to let me know that Dena's HR was running 30-40s, but she was  asymptomatic. Given Dena's frailty, I advised she send Dena to the hospital.    I was again contacted by Dena's home health NP on 6/3/24 notifying me that Dena's HR was still running 30-40s. It does not look like Dena went to the hospital for evaluation. I again advised hospital evaluation.    Dena is here in my office today for evaluation of this low heart rate/abnormal rhythm. She is asymptomatic. Her EKG shows Sinus Arrhythmia with a HR of 59 bpm.    ROS: unless otherwise noted in the history of present illness, all other systems were reviewed and they are negative for complaints     Allergies:  Patient has no known allergies.    Current Outpatient Medications   Medication Instructions    amiodarone (PACERONE) 200 mg, oral, Daily    brimonidine (AlphaGAN) 0.2 % ophthalmic solution 1 drop, Left Eye, 2 times daily    Cartia  mg, oral, Daily    docusate sodium (Colace) 100 mg capsule TAKE 1 CAPSULE BY MOUTH ONCE DAILY FOR 30 DAYS    dorzolamide-timoloL (Cosopt) 22.3-6.8 mg/mL ophthalmic solution 1 drop, Left Eye, 2 times daily    furosemide (LASIX) 40 mg, oral, Daily    insulin asp prt-insulin aspart (NovoLOG Mix 70-30 U-100 Insuln) 100 unit/mL (70-30) injection subcutaneous    iron polysaccharides (NU-IRON,NIFEREX) 150 mg, oral, Daily    lactobacillus acidophilus capsule 1 capsule, oral, Daily    latanoprost (Xalatan) 0.005 % ophthalmic solution ophthalmic (eye)    losartan (COZAAR) 50 mg, oral, Daily    Xarelto 10 mg, oral, Daily        Last Labs:  CBC  Lab Results   Component Value Date    WBC 7.6 02/14/2024    HGB 11.4 (L) 02/14/2024    HCT 37.4 02/14/2024    MCV 86 02/14/2024     02/14/2024       CMP  Lab Results   Component Value Date    CALCIUM 9.3 02/14/2024    PHOS 3.6 01/04/2022    PROT 6.3 (L) 02/14/2024    ALBUMIN 3.8 02/14/2024    AST 14 02/14/2024    ALT 8 02/14/2024    ALKPHOS 76 02/14/2024    BILITOT 0.9 02/14/2024       BMP   Lab Results   Component Value Date     " 02/14/2024    K 4.4 02/14/2024     (H) 02/14/2024    CO2 28 02/14/2024    GLUCOSE 60 (L) 02/14/2024    BUN 25 (H) 02/14/2024    CREATININE 1.42 (H) 02/14/2024       LIPID PANEL   Lab Results   Component Value Date    CHOL 127 05/15/2020    TRIG 142 05/15/2020    HDL 57.6 05/15/2020    CHHDL 2.2 05/15/2020    LDLF 41 05/15/2020    VLDL 28 05/15/2020       RENAL FUNCTION PANEL   Lab Results   Component Value Date    GLUCOSE 60 (L) 02/14/2024     02/14/2024    K 4.4 02/14/2024     (H) 02/14/2024    CO2 28 02/14/2024    ANIONGAP 10 02/14/2024    BUN 25 (H) 02/14/2024    CREATININE 1.42 (H) 02/14/2024    CALCIUM 9.3 02/14/2024    PHOS 3.6 01/04/2022    ALBUMIN 3.8 02/14/2024        Lab Results   Component Value Date     (H) 10/06/2020    HGBA1C 6.6 05/15/2020       Last Recorded Vitals:  Vitals:    06/05/24 1605   BP: 140/90   BP Location: Left arm   Patient Position: Sitting   Pulse: 63   Resp: 18   SpO2: 97%   Height: 1.549 m (5' 1\")       Physical Exam:  On exam Ms. Short appears her stated age, is alert and oriented x3, and in no acute distress. Her sclera are anicteric and her oropharynx has moist mucous membranes. Her neck is supple and without thyromegaly. The JVP is ~5 cm of water above the right atrium. Her cardiac exam has irregular rhythm, normal S1, S2. No S3/4. There are no murmurs. Her lungs are clear to auscultation bilaterally and there is no dullness to percussion. Her abdomen is soft, nontender with normoactive bowel sounds. There is no HJR. The extremities are warm and without edema. The skin is dry. There is no rash present. The distal pulses are 2-3+ in all four extremities. Her mood and affect are appropriate for todays encounter.     Assessment/Plan:  1. Paroxysmal AFib s/p DCCV (4 shocks) in 2018. Remains asymptomatic with no embolic events. EKG as above  - c/w Xarelto 10mg every day  - she hasn't taken any of her other cardiac medications in over a month. Sometimes " she'll take them & then she doesn't again    2. PAD s/p stenting Rt SFA-popliteal artery & angioplasty rt PT & peroneal arteries (12/2021, By Dr. Gino Aiken). Per Dr. Aiken's cath report, plan was for at least 6 months of Plavix/Xarelto (10mg) or until wound is healed. Then convert to ASA indefinitely & Xarelto 10mg for VTE prevention.  - she is only taking Xarelto     3. Anticoagulation, long term. No embolic events nor issues with bleeding.  - counseled on the bleeding risk with DOAC and how to minimize it.      4. Mechanical Falls. None reported this past year. Will need to continually assess risk vs benefit of DOAC if she continues to have falls.     5. Cardiomyopathy.   This was tachy-mediated. recovered on subsequent echocardiogram in August 2018. Last Echo May 2021 with LVEF 55-60%. She has no symptoms of heart failure.     6. Prior non-STEMI.   But with negative stress test for ischemia 2018, and remains without any anginal symptoms since.     7. CAD. Lexiscan stress test done in 2018 showing no evidence of ischemia though there was anteriolateral scar.  - self d/c'd medications     8. Hypertension. Well controlled     9. Sinus Arrhythmia.  Nothing to do. She is asymptomatic. Truly what needs to be done is have someone manage her medications to ensure she is taking correct medications & correct doses.    Keep annual follow up    Tracy M Schwab, MONA-CNP

## 2024-07-07 DIAGNOSIS — I25.2 H/O NON-ST ELEVATION MYOCARDIAL INFARCTION (NSTEMI): ICD-10-CM

## 2024-07-08 RX ORDER — LOSARTAN POTASSIUM 50 MG/1
50 TABLET ORAL DAILY
Qty: 90 TABLET | Refills: 3 | Status: SHIPPED | OUTPATIENT
Start: 2024-07-08

## 2024-10-01 DIAGNOSIS — I50.32 CHRONIC DIASTOLIC HEART FAILURE: ICD-10-CM

## 2024-10-01 RX ORDER — FUROSEMIDE 40 MG/1
40 TABLET ORAL DAILY
Qty: 90 TABLET | Refills: 3 | Status: SHIPPED | OUTPATIENT
Start: 2024-10-01

## 2024-12-26 DIAGNOSIS — I48.91 ATRIAL FIBRILLATION, UNSPECIFIED TYPE (MULTI): ICD-10-CM

## 2024-12-26 RX ORDER — RIVAROXABAN 10 MG/1
10 TABLET, FILM COATED ORAL DAILY
Qty: 90 TABLET | Refills: 3 | Status: ON HOLD | OUTPATIENT
Start: 2024-12-26

## 2024-12-29 ENCOUNTER — APPOINTMENT (OUTPATIENT)
Dept: RADIOLOGY | Facility: HOSPITAL | Age: 89
DRG: 522 | End: 2024-12-29
Payer: MEDICARE

## 2024-12-29 ENCOUNTER — APPOINTMENT (OUTPATIENT)
Dept: CARDIOLOGY | Facility: HOSPITAL | Age: 89
DRG: 522 | End: 2024-12-29
Payer: MEDICARE

## 2024-12-29 ENCOUNTER — HOSPITAL ENCOUNTER (INPATIENT)
Facility: HOSPITAL | Age: 89
Discharge: NURSING, REHAB OR LONG-TERM CARE | DRG: 522 | End: 2024-12-29
Attending: EMERGENCY MEDICINE | Admitting: INTERNAL MEDICINE
Payer: MEDICARE

## 2024-12-29 DIAGNOSIS — S72.002A CLOSED FRACTURE OF LEFT HIP, INITIAL ENCOUNTER: Primary | ICD-10-CM

## 2024-12-29 DIAGNOSIS — I10 ESSENTIAL HYPERTENSION, BENIGN: ICD-10-CM
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
## 2024-12-29 DIAGNOSIS — K59.00 CONSTIPATION, UNSPECIFIED CONSTIPATION TYPE: ICD-10-CM

## 2024-12-29 DIAGNOSIS — Z01.810 PREOP CARDIOVASCULAR EXAM: ICD-10-CM

## 2024-12-29 DIAGNOSIS — E56.9 VITAMIN DEFICIENCY: ICD-10-CM

## 2024-12-29 DIAGNOSIS — E11.40 TYPE 2 DIABETES MELLITUS WITH DIABETIC NEUROPATHY, UNSPECIFIED WHETHER LONG TERM INSULIN USE (MULTI): ICD-10-CM

## 2024-12-29 DIAGNOSIS — R52 PAIN: ICD-10-CM

## 2024-12-29 DIAGNOSIS — S72.001A CLOSED FRACTURE OF RIGHT HIP, INITIAL ENCOUNTER: ICD-10-CM

## 2024-12-29 DIAGNOSIS — I25.5 ISCHEMIC CARDIOMYOPATHY: ICD-10-CM

## 2024-12-29 DIAGNOSIS — I25.2 HISTORY OF NON-ST ELEVATION MYOCARDIAL INFARCTION (NSTEMI): ICD-10-CM

## 2024-12-29 LAB
ABO GROUP (TYPE) IN BLOOD: NORMAL
ALBUMIN SERPL BCP-MCNC: 4.3 G/DL (ref 3.4–5)
ALP SERPL-CCNC: 96 U/L (ref 33–136)
ALT SERPL W P-5'-P-CCNC: 14 U/L (ref 7–45)
ANION GAP SERPL CALC-SCNC: 18 MMOL/L (ref 10–20)
ANTIBODY SCREEN: NORMAL
AST SERPL W P-5'-P-CCNC: 23 U/L (ref 9–39)
BASOPHILS # BLD AUTO: 0.03 X10*3/UL (ref 0–0.1)
BASOPHILS NFR BLD AUTO: 0.2 %
BILIRUB SERPL-MCNC: 1.4 MG/DL (ref 0–1.2)
BUN SERPL-MCNC: 30 MG/DL (ref 6–23)
CALCIUM SERPL-MCNC: 9.8 MG/DL (ref 8.6–10.3)
CARDIAC TROPONIN I PNL SERPL HS: 131 NG/L (ref 0–13)
CARDIAC TROPONIN I PNL SERPL HS: 153 NG/L (ref 0–13)
CHLORIDE SERPL-SCNC: 100 MMOL/L (ref 98–107)
CO2 SERPL-SCNC: 26 MMOL/L (ref 21–32)
CREAT SERPL-MCNC: 1.35 MG/DL (ref 0.5–1.05)
EGFRCR SERPLBLD CKD-EPI 2021: 37 ML/MIN/1.73M*2
EOSINOPHIL # BLD AUTO: 0.01 X10*3/UL (ref 0–0.4)
EOSINOPHIL NFR BLD AUTO: 0.1 %
ERYTHROCYTE [DISTWIDTH] IN BLOOD BY AUTOMATED COUNT: 14.9 % (ref 11.5–14.5)
GLUCOSE BLD MANUAL STRIP-MCNC: 210 MG/DL (ref 74–99)
GLUCOSE BLD MANUAL STRIP-MCNC: 230 MG/DL (ref 74–99)
GLUCOSE BLD MANUAL STRIP-MCNC: 262 MG/DL (ref 74–99)
GLUCOSE SERPL-MCNC: 188 MG/DL (ref 74–99)
HCT VFR BLD AUTO: 43.5 % (ref 36–46)
HGB BLD-MCNC: 13.1 G/DL (ref 12–16)
IMM GRANULOCYTES # BLD AUTO: 0.08 X10*3/UL (ref 0–0.5)
IMM GRANULOCYTES NFR BLD AUTO: 0.5 % (ref 0–0.9)
INR PPP: 1.8 (ref 0.9–1.1)
LYMPHOCYTES # BLD AUTO: 0.58 X10*3/UL (ref 0.8–3)
LYMPHOCYTES NFR BLD AUTO: 3.5 %
MCH RBC QN AUTO: 26 PG (ref 26–34)
MCHC RBC AUTO-ENTMCNC: 30.1 G/DL (ref 32–36)
MCV RBC AUTO: 87 FL (ref 80–100)
MONOCYTES # BLD AUTO: 1.04 X10*3/UL (ref 0.05–0.8)
MONOCYTES NFR BLD AUTO: 6.3 %
NEUTROPHILS # BLD AUTO: 14.81 X10*3/UL (ref 1.6–5.5)
NEUTROPHILS NFR BLD AUTO: 89.4 %
NRBC BLD-RTO: 0 /100 WBCS (ref 0–0)
PLATELET # BLD AUTO: 280 X10*3/UL (ref 150–450)
POTASSIUM SERPL-SCNC: 3.7 MMOL/L (ref 3.5–5.3)
PROT SERPL-MCNC: 7.7 G/DL (ref 6.4–8.2)
PROTHROMBIN TIME: 20 SECONDS (ref 9.8–12.8)
RBC # BLD AUTO: 5.03 X10*6/UL (ref 4–5.2)
RH FACTOR (ANTIGEN D): NORMAL
SODIUM SERPL-SCNC: 140 MMOL/L (ref 136–145)
WBC # BLD AUTO: 16.6 X10*3/UL (ref 4.4–11.3)

## 2024-12-29 PROCEDURE — 2500000004 HC RX 250 GENERAL PHARMACY W/ HCPCS (ALT 636 FOR OP/ED)

## 2024-12-29 PROCEDURE — 99285 EMERGENCY DEPT VISIT HI MDM: CPT | Performed by: EMERGENCY MEDICINE

## 2024-12-29 PROCEDURE — 71045 X-RAY EXAM CHEST 1 VIEW: CPT

## 2024-12-29 PROCEDURE — 71045 X-RAY EXAM CHEST 1 VIEW: CPT | Mod: FOREIGN READ | Performed by: RADIOLOGY

## 2024-12-29 PROCEDURE — 80053 COMPREHEN METABOLIC PANEL: CPT

## 2024-12-29 PROCEDURE — 84484 ASSAY OF TROPONIN QUANT: CPT

## 2024-12-29 PROCEDURE — 73502 X-RAY EXAM HIP UNI 2-3 VIEWS: CPT | Mod: LEFT SIDE | Performed by: RADIOLOGY

## 2024-12-29 PROCEDURE — 70450 CT HEAD/BRAIN W/O DYE: CPT

## 2024-12-29 PROCEDURE — 2500000004 HC RX 250 GENERAL PHARMACY W/ HCPCS (ALT 636 FOR OP/ED): Performed by: NURSE PRACTITIONER

## 2024-12-29 PROCEDURE — 70450 CT HEAD/BRAIN W/O DYE: CPT | Performed by: RADIOLOGY

## 2024-12-29 PROCEDURE — 73552 X-RAY EXAM OF FEMUR 2/>: CPT | Mod: LT

## 2024-12-29 PROCEDURE — 85610 PROTHROMBIN TIME: CPT

## 2024-12-29 PROCEDURE — 99285 EMERGENCY DEPT VISIT HI MDM: CPT | Mod: 25 | Performed by: EMERGENCY MEDICINE

## 2024-12-29 PROCEDURE — 2500000001 HC RX 250 WO HCPCS SELF ADMINISTERED DRUGS (ALT 637 FOR MEDICARE OP)

## 2024-12-29 PROCEDURE — 99222 1ST HOSP IP/OBS MODERATE 55: CPT

## 2024-12-29 PROCEDURE — 2500000004 HC RX 250 GENERAL PHARMACY W/ HCPCS (ALT 636 FOR OP/ED): Performed by: EMERGENCY MEDICINE

## 2024-12-29 PROCEDURE — 72125 CT NECK SPINE W/O DYE: CPT

## 2024-12-29 PROCEDURE — 96375 TX/PRO/DX INJ NEW DRUG ADDON: CPT

## 2024-12-29 PROCEDURE — 93010 ELECTROCARDIOGRAM REPORT: CPT | Performed by: INTERNAL MEDICINE

## 2024-12-29 PROCEDURE — 1200000002 HC GENERAL ROOM WITH TELEMETRY DAILY

## 2024-12-29 PROCEDURE — 96374 THER/PROPH/DIAG INJ IV PUSH: CPT

## 2024-12-29 PROCEDURE — 72125 CT NECK SPINE W/O DYE: CPT | Performed by: RADIOLOGY

## 2024-12-29 PROCEDURE — 73552 X-RAY EXAM OF FEMUR 2/>: CPT | Mod: LEFT SIDE | Performed by: RADIOLOGY

## 2024-12-29 PROCEDURE — 2500000002 HC RX 250 W HCPCS SELF ADMINISTERED DRUGS (ALT 637 FOR MEDICARE OP, ALT 636 FOR OP/ED)

## 2024-12-29 PROCEDURE — 93005 ELECTROCARDIOGRAM TRACING: CPT

## 2024-12-29 PROCEDURE — 36415 COLL VENOUS BLD VENIPUNCTURE: CPT

## 2024-12-29 PROCEDURE — 86901 BLOOD TYPING SEROLOGIC RH(D): CPT | Performed by: NURSE PRACTITIONER

## 2024-12-29 PROCEDURE — 73502 X-RAY EXAM HIP UNI 2-3 VIEWS: CPT | Mod: LT

## 2024-12-29 PROCEDURE — 82947 ASSAY GLUCOSE BLOOD QUANT: CPT

## 2024-12-29 PROCEDURE — 85025 COMPLETE CBC W/AUTO DIFF WBC: CPT

## 2024-12-29 PROCEDURE — 2500000001 HC RX 250 WO HCPCS SELF ADMINISTERED DRUGS (ALT 637 FOR MEDICARE OP): Performed by: NURSE PRACTITIONER

## 2024-12-29 RX ORDER — MORPHINE SULFATE 2 MG/ML
2 INJECTION, SOLUTION INTRAMUSCULAR; INTRAVENOUS EVERY 4 HOURS PRN
Status: DISCONTINUED | OUTPATIENT
Start: 2024-12-29 | End: 2024-12-29

## 2024-12-29 RX ORDER — SODIUM CHLORIDE, SODIUM LACTATE, POTASSIUM CHLORIDE, CALCIUM CHLORIDE 600; 310; 30; 20 MG/100ML; MG/100ML; MG/100ML; MG/100ML
50 INJECTION, SOLUTION INTRAVENOUS CONTINUOUS
Status: DISCONTINUED | OUTPATIENT
Start: 2024-12-29 | End: 2024-12-30

## 2024-12-29 RX ORDER — ACETAMINOPHEN 325 MG/1
650 TABLET ORAL EVERY 4 HOURS PRN
Status: DISCONTINUED | OUTPATIENT
Start: 2024-12-29 | End: 2025-01-02

## 2024-12-29 RX ORDER — MORPHINE SULFATE 4 MG/ML
4 INJECTION, SOLUTION INTRAMUSCULAR; INTRAVENOUS ONCE
Status: COMPLETED | OUTPATIENT
Start: 2024-12-29 | End: 2024-12-29

## 2024-12-29 RX ORDER — INSULIN LISPRO 100 [IU]/ML
0-10 INJECTION, SOLUTION INTRAVENOUS; SUBCUTANEOUS EVERY 6 HOURS
Status: DISCONTINUED | OUTPATIENT
Start: 2024-12-29 | End: 2024-12-30

## 2024-12-29 RX ORDER — DEXTROSE 50 % IN WATER (D50W) INTRAVENOUS SYRINGE
25
Status: DISCONTINUED | OUTPATIENT
Start: 2024-12-29 | End: 2025-01-07 | Stop reason: HOSPADM

## 2024-12-29 RX ORDER — FENTANYL CITRATE 50 UG/ML
25 INJECTION, SOLUTION INTRAMUSCULAR; INTRAVENOUS ONCE
Status: COMPLETED | OUTPATIENT
Start: 2024-12-29 | End: 2024-12-29

## 2024-12-29 RX ORDER — ROSUVASTATIN CALCIUM 5 MG/1
5 TABLET, COATED ORAL EVERY EVENING
COMMUNITY

## 2024-12-29 RX ORDER — ACETAMINOPHEN 160 MG/5ML
650 SOLUTION ORAL EVERY 4 HOURS PRN
Status: DISCONTINUED | OUTPATIENT
Start: 2024-12-29 | End: 2025-01-02

## 2024-12-29 RX ORDER — FUROSEMIDE 40 MG/1
40 TABLET ORAL DAILY
Status: DISCONTINUED | OUTPATIENT
Start: 2024-12-30 | End: 2024-12-30

## 2024-12-29 RX ORDER — MORPHINE SULFATE 2 MG/ML
1 INJECTION, SOLUTION INTRAMUSCULAR; INTRAVENOUS EVERY 4 HOURS PRN
Status: DISCONTINUED | OUTPATIENT
Start: 2024-12-29 | End: 2024-12-31

## 2024-12-29 RX ORDER — POLYETHYLENE GLYCOL 3350 17 G/17G
17 POWDER, FOR SOLUTION ORAL DAILY PRN
Status: DISCONTINUED | OUTPATIENT
Start: 2024-12-29 | End: 2025-01-07 | Stop reason: HOSPADM

## 2024-12-29 RX ORDER — ROSUVASTATIN CALCIUM 5 MG/1
5 TABLET, COATED ORAL EVERY EVENING
Status: DISCONTINUED | OUTPATIENT
Start: 2024-12-29 | End: 2025-01-07 | Stop reason: HOSPADM

## 2024-12-29 RX ORDER — ACETAMINOPHEN 650 MG/1
650 SUPPOSITORY RECTAL EVERY 4 HOURS PRN
Status: DISCONTINUED | OUTPATIENT
Start: 2024-12-29 | End: 2025-01-02

## 2024-12-29 RX ORDER — MORPHINE SULFATE 2 MG/ML
2 INJECTION, SOLUTION INTRAMUSCULAR; INTRAVENOUS EVERY 4 HOURS PRN
Status: DISCONTINUED | OUTPATIENT
Start: 2024-12-29 | End: 2024-12-31

## 2024-12-29 RX ORDER — TALC
3 POWDER (GRAM) TOPICAL NIGHTLY PRN
Status: DISCONTINUED | OUTPATIENT
Start: 2024-12-29 | End: 2025-01-07 | Stop reason: HOSPADM

## 2024-12-29 RX ORDER — DEXTROSE 50 % IN WATER (D50W) INTRAVENOUS SYRINGE
12.5
Status: DISCONTINUED | OUTPATIENT
Start: 2024-12-29 | End: 2025-01-07 | Stop reason: HOSPADM

## 2024-12-29 RX ORDER — LIDOCAINE 560 MG/1
2 PATCH PERCUTANEOUS; TOPICAL; TRANSDERMAL NIGHTLY
Status: DISCONTINUED | OUTPATIENT
Start: 2024-12-29 | End: 2025-01-07 | Stop reason: HOSPADM

## 2024-12-29 RX ORDER — BRIMONIDINE TARTRATE 2 MG/ML
1 SOLUTION/ DROPS OPHTHALMIC 2 TIMES DAILY
Status: DISCONTINUED | OUTPATIENT
Start: 2024-12-29 | End: 2025-01-07 | Stop reason: HOSPADM

## 2024-12-29 RX ORDER — DORZOLAMIDE HYDROCHLORIDE AND TIMOLOL MALEATE 20; 5 MG/ML; MG/ML
1 SOLUTION/ DROPS OPHTHALMIC 2 TIMES DAILY
Status: DISCONTINUED | OUTPATIENT
Start: 2024-12-29 | End: 2025-01-07 | Stop reason: HOSPADM

## 2024-12-29 RX ADMIN — MORPHINE SULFATE 4 MG: 4 INJECTION, SOLUTION INTRAMUSCULAR; INTRAVENOUS at 14:22

## 2024-12-29 RX ADMIN — FENTANYL CITRATE 25 MCG: 50 INJECTION, SOLUTION INTRAMUSCULAR; INTRAVENOUS at 15:32

## 2024-12-29 RX ADMIN — MORPHINE SULFATE 2 MG: 2 INJECTION, SOLUTION INTRAMUSCULAR; INTRAVENOUS at 21:38

## 2024-12-29 RX ADMIN — DORZOLAMIDE HYDROCHLORIDE AND TIMOLOL MALEATE 1 DROP: 20; 5 SOLUTION/ DROPS OPHTHALMIC at 22:31

## 2024-12-29 RX ADMIN — ACETAMINOPHEN 650 MG: 325 TABLET ORAL at 19:29

## 2024-12-29 RX ADMIN — INSULIN LISPRO 6 UNITS: 100 INJECTION, SOLUTION INTRAVENOUS; SUBCUTANEOUS at 22:35

## 2024-12-29 RX ADMIN — SODIUM CHLORIDE, POTASSIUM CHLORIDE, SODIUM LACTATE AND CALCIUM CHLORIDE 50 ML/HR: 600; 310; 30; 20 INJECTION, SOLUTION INTRAVENOUS at 22:31

## 2024-12-29 RX ADMIN — BRIMONIDINE TARTRATE 1 DROP: 2 SOLUTION/ DROPS OPHTHALMIC at 22:31

## 2024-12-29 RX ADMIN — ROSUVASTATIN CALCIUM 5 MG: 5 TABLET, FILM COATED ORAL at 22:31

## 2024-12-29 SDOH — SOCIAL STABILITY: SOCIAL INSECURITY: DO YOU FEEL UNSAFE GOING BACK TO THE PLACE WHERE YOU ARE LIVING?: NO

## 2024-12-29 SDOH — SOCIAL STABILITY: SOCIAL INSECURITY: HAS ANYONE EVER THREATENED TO HURT YOUR FAMILY OR YOUR PETS?: NO

## 2024-12-29 SDOH — ECONOMIC STABILITY: FOOD INSECURITY: WITHIN THE PAST 12 MONTHS, YOU WORRIED THAT YOUR FOOD WOULD RUN OUT BEFORE YOU GOT THE MONEY TO BUY MORE.: NEVER TRUE

## 2024-12-29 SDOH — SOCIAL STABILITY: SOCIAL INSECURITY: WITHIN THE LAST YEAR, HAVE YOU BEEN AFRAID OF YOUR PARTNER OR EX-PARTNER?: NO

## 2024-12-29 SDOH — SOCIAL STABILITY: SOCIAL INSECURITY
WITHIN THE LAST YEAR, HAVE YOU BEEN KICKED, HIT, SLAPPED, OR OTHERWISE PHYSICALLY HURT BY YOUR PARTNER OR EX-PARTNER?: NO

## 2024-12-29 SDOH — ECONOMIC STABILITY: FOOD INSECURITY: WITHIN THE PAST 12 MONTHS, THE FOOD YOU BOUGHT JUST DIDN'T LAST AND YOU DIDN'T HAVE MONEY TO GET MORE.: NEVER TRUE

## 2024-12-29 SDOH — SOCIAL STABILITY: SOCIAL INSECURITY: DOES ANYONE TRY TO KEEP YOU FROM HAVING/CONTACTING OTHER FRIENDS OR DOING THINGS OUTSIDE YOUR HOME?: NO

## 2024-12-29 SDOH — ECONOMIC STABILITY: INCOME INSECURITY: IN THE PAST 12 MONTHS HAS THE ELECTRIC, GAS, OIL, OR WATER COMPANY THREATENED TO SHUT OFF SERVICES IN YOUR HOME?: NO

## 2024-12-29 SDOH — SOCIAL STABILITY: SOCIAL INSECURITY: ABUSE: ADULT

## 2024-12-29 SDOH — SOCIAL STABILITY: SOCIAL INSECURITY: DO YOU FEEL ANYONE HAS EXPLOITED OR TAKEN ADVANTAGE OF YOU FINANCIALLY OR OF YOUR PERSONAL PROPERTY?: NO

## 2024-12-29 SDOH — SOCIAL STABILITY: SOCIAL INSECURITY: ARE YOU OR HAVE YOU BEEN THREATENED OR ABUSED PHYSICALLY, EMOTIONALLY, OR SEXUALLY BY ANYONE?: NO

## 2024-12-29 SDOH — SOCIAL STABILITY: SOCIAL INSECURITY: WITHIN THE LAST YEAR, HAVE YOU BEEN HUMILIATED OR EMOTIONALLY ABUSED IN OTHER WAYS BY YOUR PARTNER OR EX-PARTNER?: NO

## 2024-12-29 SDOH — SOCIAL STABILITY: SOCIAL INSECURITY: HAVE YOU HAD THOUGHTS OF HARMING ANYONE ELSE?: NO

## 2024-12-29 SDOH — SOCIAL STABILITY: SOCIAL INSECURITY: ARE THERE ANY APPARENT SIGNS OF INJURIES/BEHAVIORS THAT COULD BE RELATED TO ABUSE/NEGLECT?: NO

## 2024-12-29 SDOH — SOCIAL STABILITY: SOCIAL INSECURITY
WITHIN THE LAST YEAR, HAVE YOU BEEN RAPED OR FORCED TO HAVE ANY KIND OF SEXUAL ACTIVITY BY YOUR PARTNER OR EX-PARTNER?: NO

## 2024-12-29 SDOH — SOCIAL STABILITY: SOCIAL INSECURITY: HAVE YOU HAD ANY THOUGHTS OF HARMING ANYONE ELSE?: NO

## 2024-12-29 SDOH — SOCIAL STABILITY: SOCIAL INSECURITY: WERE YOU ABLE TO COMPLETE ALL THE BEHAVIORAL HEALTH SCREENINGS?: YES

## 2024-12-29 ASSESSMENT — PAIN DESCRIPTION - ORIENTATION
ORIENTATION: LEFT
ORIENTATION: LEFT;OTHER (COMMENT)
ORIENTATION: LEFT

## 2024-12-29 ASSESSMENT — LIFESTYLE VARIABLES
AUDIT-C TOTAL SCORE: 0
HOW MANY STANDARD DRINKS CONTAINING ALCOHOL DO YOU HAVE ON A TYPICAL DAY: PATIENT DOES NOT DRINK
AUDIT-C TOTAL SCORE: 0
HOW OFTEN DO YOU HAVE A DRINK CONTAINING ALCOHOL: NEVER
HOW OFTEN DO YOU HAVE 6 OR MORE DRINKS ON ONE OCCASION: NEVER
SKIP TO QUESTIONS 9-10: 1

## 2024-12-29 ASSESSMENT — PAIN SCALES - GENERAL
PAINLEVEL_OUTOF10: 8
PAINLEVEL_OUTOF10: 2
PAINLEVEL_OUTOF10: 10 - WORST POSSIBLE PAIN
PAINLEVEL_OUTOF10: 10 - WORST POSSIBLE PAIN
PAINLEVEL_OUTOF10: 8
PAINLEVEL_OUTOF10: 8

## 2024-12-29 ASSESSMENT — COGNITIVE AND FUNCTIONAL STATUS - GENERAL
EATING MEALS: A LITTLE
HELP NEEDED FOR BATHING: A LOT
TOILETING: A LOT
TURNING FROM BACK TO SIDE WHILE IN FLAT BAD: A LOT
DRESSING REGULAR UPPER BODY CLOTHING: A LOT
PATIENT BASELINE BEDBOUND: NO
MOVING TO AND FROM BED TO CHAIR: A LOT
PERSONAL GROOMING: A LITTLE
DRESSING REGULAR LOWER BODY CLOTHING: A LOT
MOBILITY SCORE: 9
CLIMB 3 TO 5 STEPS WITH RAILING: TOTAL
DAILY ACTIVITIY SCORE: 14
STANDING UP FROM CHAIR USING ARMS: TOTAL
WALKING IN HOSPITAL ROOM: TOTAL
MOVING FROM LYING ON BACK TO SITTING ON SIDE OF FLAT BED WITH BEDRAILS: A LOT

## 2024-12-29 ASSESSMENT — COLUMBIA-SUICIDE SEVERITY RATING SCALE - C-SSRS
2. HAVE YOU ACTUALLY HAD ANY THOUGHTS OF KILLING YOURSELF?: NO
1. IN THE PAST MONTH, HAVE YOU WISHED YOU WERE DEAD OR WISHED YOU COULD GO TO SLEEP AND NOT WAKE UP?: NO
6. HAVE YOU EVER DONE ANYTHING, STARTED TO DO ANYTHING, OR PREPARED TO DO ANYTHING TO END YOUR LIFE?: NO

## 2024-12-29 ASSESSMENT — PAIN DESCRIPTION - LOCATION
LOCATION: OTHER (COMMENT)
LOCATION: LEG
LOCATION: HIP
LOCATION: HIP

## 2024-12-29 ASSESSMENT — ACTIVITIES OF DAILY LIVING (ADL)
WALKS IN HOME: DEPENDENT
GROOMING: NEEDS ASSISTANCE
LACK_OF_TRANSPORTATION: NO
HEARING - LEFT EAR: DIFFICULTY WITH NOISE
JUDGMENT_ADEQUATE_SAFELY_COMPLETE_DAILY_ACTIVITIES: YES
DRESSING YOURSELF: DEPENDENT
ADEQUATE_TO_COMPLETE_ADL: YES
HEARING - RIGHT EAR: DIFFICULTY WITH NOISE
TOILETING: NEEDS ASSISTANCE
FEEDING YOURSELF: NEEDS ASSISTANCE
BATHING: NEEDS ASSISTANCE
PATIENT'S MEMORY ADEQUATE TO SAFELY COMPLETE DAILY ACTIVITIES?: YES
ASSISTIVE_DEVICE: WALKER

## 2024-12-29 ASSESSMENT — PAIN DESCRIPTION - DESCRIPTORS
DESCRIPTORS: ACHING

## 2024-12-29 ASSESSMENT — PAIN - FUNCTIONAL ASSESSMENT
PAIN_FUNCTIONAL_ASSESSMENT: 0-10

## 2024-12-29 ASSESSMENT — PAIN DESCRIPTION - PROGRESSION: CLINICAL_PROGRESSION: NOT CHANGED

## 2024-12-29 ASSESSMENT — PAIN SCALES - PAIN ASSESSMENT IN ADVANCED DEMENTIA (PAINAD): TOTALSCORE: MEDICATION (SEE MAR)

## 2024-12-29 ASSESSMENT — PAIN DESCRIPTION - PAIN TYPE: TYPE: ACUTE PAIN

## 2024-12-29 ASSESSMENT — PAIN DESCRIPTION - FREQUENCY: FREQUENCY: CONSTANT/CONTINUOUS

## 2024-12-29 ASSESSMENT — PATIENT HEALTH QUESTIONNAIRE - PHQ9
SUM OF ALL RESPONSES TO PHQ9 QUESTIONS 1 & 2: 0
1. LITTLE INTEREST OR PLEASURE IN DOING THINGS: NOT AT ALL
2. FEELING DOWN, DEPRESSED OR HOPELESS: NOT AT ALL

## 2024-12-29 ASSESSMENT — PAIN DESCRIPTION - ONSET: ONSET: ONGOING

## 2024-12-29 NOTE — CONSULTS
Reason For Consult  Left hip pain    History Of Present Illness  Dena Short is a 89 y.o. female presenting with left hip pain following a ground-level fall that occurred earlier today.  She reports that she was standing in her kitchen sink and sustained a mechanical fall falling onto her left side.  She was unsure if she had hit her head.  She denied loss of consciousness.  There is significant pain and inability to bear weight.  X-rays were obtained in the emergency department which demonstrated a left femoral neck fracture.  The patient has a history of a prior hemiarthroplasty which was performed in 2021 for a right femoral neck fracture by Dr. Zamora.  She denies any issues with that hip.  She really only endorses pain that localizes to her left buttock primarily.  She denies any numbness or tingling.  She denies any additional injuries.     Past Medical History  She has a past medical history of Personal history of other diseases of the circulatory system, Personal history of other diseases of the circulatory system, and Personal history of other diseases of the circulatory system (04/09/2018).    Surgical History  She has a past surgical history that includes Other surgical history (06/14/2021); Other surgical history (06/14/2021); Other surgical history (06/14/2021); Other surgical history (01/27/2022); Cholecystectomy (09/14/2018); and Other surgical history (09/14/2018).     Social History  She reports that she has never smoked. She has never used smokeless tobacco. She reports that she does not drink alcohol and does not use drugs.    Family History  No family history on file.     Allergies  Patient has no known allergies.         Physical Exam  Focused examination of the left lower extremity reveals tenderness to palpation over the greater trochanter.  There is also pain with logroll.  There is obvious deformity of the left lower extremity which is shortened and externally rotated.  There is no  tenderness to palpation more distally around the distal femur or knee.  There is no tenderness to palpation over the tib-fib or ankle.  The patient has intact plantarflexion and dorsiflexion of the ankle as well as EHL function.  Sensation is grossly intact to light touch in the left lower extremity.  DP pulse 2+.    The remainder of the secondary exam is negative for bony tenderness or limited range of motion of the right lower extremity or the bilateral upper extremities.     Last Recorded Vitals  Blood pressure 136/72, pulse (!) 142, temperature 36.8 °C (98.2 °F), resp. rate 16, height 1.524 m (5'), weight 66.2 kg (146 lb), SpO2 95%.    Relevant Results  CT cervical spine wo IV contrast    Result Date: 12/29/2024  Interpreted By:  Antwan Duarte, STUDY: CT CERVICAL SPINE WO IV CONTRAST; 12/29/2024 3:21 pm   INDICATION: Signs/Symptoms:fall on thinners.   COMPARISON: CT dated 05/05/2021   ACCESSION NUMBER(S): [id number]   ORDERING CLINICIAN: JOHN PANTOJA   TECHNIQUE: Contiguous axial CT images were obtained at  2 mm slice thickness through the cervical spine without contrast administration. The images were then reconstructed in the coronal and sagittal planes.   FINDINGS: There is no CT evidence of acute fracture identified. No prevertebral soft tissue swelling is identified.   ALIGNMENT: Minimal anterolisthesis of C2 on C3, of C3 on C4 and of C5 on C6.   VERTEBRAE/DISC SPACES: Mild discogenic degenerative changes are seen throughout the cervical spine, greatest at the C6-7 level. Moderate facet degenerative changes are seen throughout the cervical spine.   ADDITIONAL FINDINGS: Evaluation of the visualized soft tissues of the neck is limited by the lack of intravenous contrast.  Within this limitation, no gross mass or lymphadenopathy is identified. Dense atherosclerotic calcifications are seen in the carotid bulbs bilaterally. A metallic stent is seen in the carotid artery.       1.  No CT evidence of acute  fracture. 2.  Degenerative changes throughout the cervical spine, as above.   Signed by: Antwan Duarte 12/29/2024 3:49 PM Dictation workstation:   [id number]    CT head wo IV contrast    Result Date: 12/29/2024  Interpreted By:  Antwan Duarte, STUDY: CT HEAD WO IV CONTRAST; 12/29/2024 3:21 pm   INDICATION: Signs/Symptoms:fall on thinners.   COMPARISON: CT dated 03/29/2022   ACCESSION NUMBER(S): [id number]   ORDERING CLINICIAN: JOHN PANTOJA   TECHNIQUE: Contiguous axial CT images were obtained through the head at 5 mm slice thickness without contrast administration.   FINDINGS: INTRACRANIAL: Mild diffuse volume loss is seen bilaterally. Mild periventricular white matter changes are seen.  The grey-white differentiation is intact. There is no mass effect or midline shift. There is no extraaxial fluid collection. There is no intracranial hemorrhage. The calvarium  is unremarkable.   EXTRACRANIAL: Visualized paranasal sinuses and mastoids are clear.       1. Diffuse volume loss and periventricular white matter changes, most consistent with small vessel ischemic disease. 2. No evidence of acute cortical infarct or intracranial hemorrhage.     MACRO: None   Signed by: Antwan Duarte 12/29/2024 3:46 PM Dictation workstation:   [id number]    XR chest 1 view    Result Date: 12/29/2024  STUDY: Chest Radiograph;  12/29/2024 2:47PM INDICATION: Patient had a fall. COMPARISON: 5/6/2021 XR Chest. ACCESSION NUMBER(S): [id number] ORDERING CLINICIAN: JOHN PANTOJA TECHNIQUE:  Frontal chest was obtained at 1447 hours. FINDINGS: CARDIOMEDIASTINAL SILHOUETTE: Heart is mildly enlarged with slight increased pulmonary vascularity.  LUNGS: Minimal atelectasis in the right base.  ABDOMEN: No remarkable upper abdominal findings.  BONES: No acute osseous changes.    Mild vascular congestion without overt edema. Signed by Wil Tapia MD    XR hip left with pelvis when performed 2 or 3 views    Result Date: 12/29/2024  STUDY: Pelvis  and Left Hip Radiographs; 12/29/2024 1:47PM INDICATION: Left hip injury. COMPARISON: None Available. ACCESSION NUMBER(S): [id number] ORDERING CLINICIAN: JOHN PANTOJA TECHNIQUE:  AP view of the pelvis and two view(s) (three images) of the left hip. FINDINGS:  PELVIS: The pelvic ring is intact.  There is no acute fracture.  Right total hip arthroplasty noted. LEFT HIP: Impacted mildly angulated left sided subcapital femoral neck fracture.    Impacted mildly angulated left sided subcapital femoral neck fracture. Signed by Wil Tapia MD       Scheduled medications     Continuous medications     PRN medications  PRN medications: oxygen  Results for orders placed or performed during the hospital encounter of 12/29/24 (from the past 24 hours)   CBC and Auto Differential   Result Value Ref Range    WBC 16.6 (H) 4.4 - 11.3 x10*3/uL    nRBC 0.0 0.0 - 0.0 /100 WBCs    RBC 5.03 4.00 - 5.20 x10*6/uL    Hemoglobin 13.1 12.0 - 16.0 g/dL    Hematocrit 43.5 36.0 - 46.0 %    MCV 87 80 - 100 fL    MCH 26.0 26.0 - 34.0 pg    MCHC 30.1 (L) 32.0 - 36.0 g/dL    RDW 14.9 (H) 11.5 - 14.5 %    Platelets 280 150 - 450 x10*3/uL    Neutrophils % 89.4 40.0 - 80.0 %    Immature Granulocytes %, Automated 0.5 0.0 - 0.9 %    Lymphocytes % 3.5 13.0 - 44.0 %    Monocytes % 6.3 2.0 - 10.0 %    Eosinophils % 0.1 0.0 - 6.0 %    Basophils % 0.2 0.0 - 2.0 %    Neutrophils Absolute 14.81 (H) 1.60 - 5.50 x10*3/uL    Immature Granulocytes Absolute, Automated 0.08 0.00 - 0.50 x10*3/uL    Lymphocytes Absolute 0.58 (L) 0.80 - 3.00 x10*3/uL    Monocytes Absolute 1.04 (H) 0.05 - 0.80 x10*3/uL    Eosinophils Absolute 0.01 0.00 - 0.40 x10*3/uL    Basophils Absolute 0.03 0.00 - 0.10 x10*3/uL   Comprehensive metabolic panel   Result Value Ref Range    Glucose 188 (H) 74 - 99 mg/dL    Sodium 140 136 - 145 mmol/L    Potassium 3.7 3.5 - 5.3 mmol/L    Chloride 100 98 - 107 mmol/L    Bicarbonate 26 21 - 32 mmol/L    Anion Gap 18 10 - 20 mmol/L    Urea Nitrogen 30  (H) 6 - 23 mg/dL    Creatinine 1.35 (H) 0.50 - 1.05 mg/dL    eGFR 37 (L) >60 mL/min/1.73m*2    Calcium 9.8 8.6 - 10.3 mg/dL    Albumin 4.3 3.4 - 5.0 g/dL    Alkaline Phosphatase 96 33 - 136 U/L    Total Protein 7.7 6.4 - 8.2 g/dL    AST 23 9 - 39 U/L    Bilirubin, Total 1.4 (H) 0.0 - 1.2 mg/dL    ALT 14 7 - 45 U/L   Protime-INR   Result Value Ref Range    Protime 20.0 (H) 9.8 - 12.8 seconds    INR 1.8 (H) 0.9 - 1.1        Assessment/Plan     Left femoral neck fracture    I had a long discussion with the patient about the nature of her injury.  We discussed that surgical intervention is indicated to restore the patient back to ambulatory status with the use of an assistive device and minimize the risk of prolonged immobilization and bedrest associated with non-operative treatment of her femoral neck fracture.  We discussed that non-operative treatment greatly increases the risks of pneumonia, blood clots and the development of skin breakdown/ulcerations amongst other risks.  Furthermore, we discussed the nature of this injury and the morbidity and mortality associated with this injury in general.  We discussed the risk benefits and alternatives to surgery including risk of dislocation, risk of implant failure, risk of need for additional procedures, risk of damage to surrounding nerves or blood vessels, risk of ongoing pain following the procedure, risk of infection or wound healing complications, risk of bleeding, risk of general anesthesia and risks of blood clots. Patient has expressed understanding of these risks and wishes to proceed forward with surgical intervention.  I have recommended a hemiarthroplasty performed through a posterior approach.       -Admission to medicine for preoperative optimization/medical mgmt - appreciate recommendations  -Will obtain completion imaging of left femur  -NPO at midnight in preparation for surgery tomorrow vs. Tuesday pending cardiac clearance, OR availability  -Will  follow-up on cardiac clearance  -Hold AC in preparation for surgery, last dose of Xarelto was yesterday  -Multimodal pain control  -Ancef pre-op  -NWB LLE, bedrest prior to surgery  -PT/OT eval and treat post-op  -Anticipate discharge to SNF post-op  -Please contact me with questions     Hema Benoit MD  [facility]    This patient was admitted yesterday with a femoral neck fracture of the left hip that after workup and discussion and physical examination with the patient was deemed a candidate for hemiarthroplasty repair so that the patient could remain ambulatory.  The risks and benefits were discussed at length by Dr. Benoit yesterday, and they were also discussed today by myself with the patient.  Nonsurgical management of this fracture would result in most likely a nonambulatory state, most likely lifetime.  Will proceed with this emergent surgery, give the patient preoperative antibiotics and postoperative deep venous thrombosis prophylaxis.  Will try to give the patient a fully weightbearing status postoperatively as well

## 2024-12-29 NOTE — ED PROVIDER NOTES
HPI   Chief Complaint   Patient presents with    Fall       Patient is a 89-year-old female presenting [facility] ED for a fall injury.  Patient reports that she was standing in her kitchen sink, and felt like her leg may have gave out from under her causing her to fall onto her left side.  Patient does not reliably recall if she hit her head or not.  Patient complaining of significant left hip pain, deformity of the left leg.  Patient denies any element of chest pain, shortness of breath, nausea, vomiting, diarrhea, abdominal pain, fever, chills, dizziness, headache, vision change, or weakness.              Patient History   Past Medical History:   Diagnosis Date    Personal history of other diseases of the circulatory system     History of atrial fibrillation    Personal history of other diseases of the circulatory system     History of cardiomyopathy    Personal history of other diseases of the circulatory system 04/09/2018    History of cardiomyopathy     Past Surgical History:   Procedure Laterality Date    CHOLECYSTECTOMY  09/14/2018    Cholecystectomy    OTHER SURGICAL HISTORY  06/14/2021    Hip replacement    OTHER SURGICAL HISTORY  06/14/2021    Hip replacement    OTHER SURGICAL HISTORY  06/14/2021    Hip replacement    OTHER SURGICAL HISTORY  01/27/2022    Arterial angioplasty of lower extremity    OTHER SURGICAL HISTORY  09/14/2018    Previous Stent Placement     No family history on file.  Social History     Tobacco Use    Smoking status: Never    Smokeless tobacco: Never   Substance Use Topics    Alcohol use: Never    Drug use: Never       Physical Exam   ED Triage Vitals [12/29/24 1341]   Temperature Heart Rate Respirations BP   36 °C (96.8 °F) 89 18 (!) 158/93      Pulse Ox Temp Source Heart Rate Source Patient Position   97 % Temporal Monitor Sitting      BP Location FiO2 (%)     Right arm --       Physical Exam  Constitutional:       Appearance: Normal appearance.   HENT:      Head: Normocephalic  and atraumatic.      Nose: Nose normal.      Mouth/Throat:      Mouth: Mucous membranes are moist.      Pharynx: Oropharynx is clear.   Eyes:      Extraocular Movements: Extraocular movements intact.      Conjunctiva/sclera: Conjunctivae normal.      Pupils: Pupils are equal, round, and reactive to light.   Cardiovascular:      Rate and Rhythm: Normal rate and regular rhythm.      Pulses: Normal pulses.      Heart sounds: Normal heart sounds.   Pulmonary:      Effort: Pulmonary effort is normal.      Breath sounds: Normal breath sounds.   Abdominal:      General: Abdomen is flat. Bowel sounds are normal.      Palpations: Abdomen is soft.   Musculoskeletal:         General: Swelling, tenderness and deformity present.      Cervical back: Normal range of motion and neck supple.      Comments: Shortened, internally rotated appearance of the left lower extremity.   Skin:     General: Skin is warm and dry.      Capillary Refill: Capillary refill takes less than 2 seconds.   Neurological:      General: No focal deficit present.      Mental Status: She is alert and oriented to person, place, and time. Mental status is at baseline.   Psychiatric:         Mood and Affect: Mood normal.         Behavior: Behavior normal.           ED Course & MDM   ED Course as of 12/29/24 1854   Sun Dec 29, 2024   1419 Patient is a 89-year-old female presenting [facility] ED for a fall injury.  Patient reports that she was standing in her kitchen sink, and felt like her leg may have gave out from under her causing her to fall onto her left side.  Patient does not reliably recall if she hit her head or not.  Patient complaining of significant left hip pain, deformity of the left leg.  Patient denies any element of chest pain, shortness of breath, nausea, vomiting, diarrhea, abdominal pain, fever, chills, dizziness, headache, vision change, or weakness. [MI]   1420 Physical exam notable for shortened and internally rotated left lower extremity.  [MI]   1420 Negative midline spinal tenderness. [MI]   1555 Consulted on-call orthopedics, Dr. Benoit who recommended admission to medicine and further surgical management. [MI]      ED Course User Index  [MI] Sonali Farr MD         Diagnoses as of 12/29/24 1854   Closed fracture of left hip, initial encounter   Fall, initial encounter                 No data recorded     Xochilt Coma Scale Score: 15 (12/29/24 1406 : Irlanda Sosa RN)                           Medical Decision Making  Patient is a 89 y.o. female who presents to [facility] ED for Fall. On initial ED evaluation, patient found to be in moderate distress. Per HPI, concern to evaluate and treat for differential diagnosis including, but not limited to left hip fracture, head injury, neck injury.  Obtaining appropriate CT, x-ray imaging.  Also obtaining basic lab workup, cardiac diagnostics, given unclear history of syncope during fall event.  EKG showed no acute ischemic changes.  CBC showed leukocytosis 16.6.  CMP showed no significant ALESIA or electrolyte abnormality from patient baseline.  Patient given morphine for pain.  X-ray imaging revealed acute left subcapital femoral fracture.  On-call orthopedics was consulted, recommended admission to medicine and further orthopedic evaluation and management in the morning.  Patient hemodynamically stable at this time.  Patient given additional 25 mcg of fentanyl for pain control.    Diagnostic findings and treatment plan discussed with patient/family. They are amenable to plan. Patient admitted to medicine service for further evaluation and management.          Procedure  Procedures     Sonali Farr MD  Resident  12/29/24 1910        The patient was seen by the resident/fellow.  I have personally performed a substantive portion of the encounter.  I have seen and examined the patient; agree with the workup, evaluation, MDM, management and diagnosis.  The care plan has been discussed with the resident; I  have reviewed the resident’s note and agree with the documented findings.                                                      Edmundo Oh, DO  01/03/25 3526

## 2024-12-29 NOTE — ED NOTES
Report called to [address]N CHRISTIAN Bose. Patient is alert and oriented x4, with a history of recent falls, resulting in a left hip fracture and reporting 10/10 pain. Consults have been requested for orthopedics and cardiology. Telemetry shows tachycardia, and PRN oxygen has been ordered. Patient can desaturate to the high 80s. A urine analysis (UA) is also needed.     Ct Cruz RN  12/29/24 2688

## 2024-12-30 ENCOUNTER — ANESTHESIA EVENT (OUTPATIENT)
Dept: OPERATING ROOM | Facility: HOSPITAL | Age: 89
End: 2024-12-30
Payer: MEDICARE

## 2024-12-30 ENCOUNTER — APPOINTMENT (OUTPATIENT)
Dept: RADIOLOGY | Facility: HOSPITAL | Age: 89
DRG: 522 | End: 2024-12-30
Payer: MEDICARE

## 2024-12-30 ENCOUNTER — ANESTHESIA (OUTPATIENT)
Dept: OPERATING ROOM | Facility: HOSPITAL | Age: 89
End: 2024-12-30
Payer: MEDICARE

## 2024-12-30 PROBLEM — D72.828 NEUTROPHILIC LEUKOCYTOSIS: Status: ACTIVE | Noted: 2024-12-30

## 2024-12-30 PROBLEM — R79.89 ELEVATED TROPONIN LEVEL: Status: ACTIVE | Noted: 2024-12-30

## 2024-12-30 LAB
ANION GAP SERPL CALC-SCNC: 19 MMOL/L (ref 10–20)
APPEARANCE UR: ABNORMAL
APPEARANCE UR: ABNORMAL
BACTERIA #/AREA URNS AUTO: ABNORMAL /HPF
BACTERIA #/AREA URNS AUTO: ABNORMAL /HPF
BILIRUB UR STRIP.AUTO-MCNC: NEGATIVE MG/DL
BILIRUB UR STRIP.AUTO-MCNC: NEGATIVE MG/DL
BUN SERPL-MCNC: 33 MG/DL (ref 6–23)
CALCIUM SERPL-MCNC: 9.3 MG/DL (ref 8.6–10.3)
CARDIAC TROPONIN I PNL SERPL HS: 148 NG/L (ref 0–13)
CHLORIDE SERPL-SCNC: 102 MMOL/L (ref 98–107)
CO2 SERPL-SCNC: 25 MMOL/L (ref 21–32)
COLOR UR: YELLOW
COLOR UR: YELLOW
CREAT SERPL-MCNC: 1.36 MG/DL (ref 0.5–1.05)
EGFRCR SERPLBLD CKD-EPI 2021: 36 ML/MIN/1.73M*2
ERYTHROCYTE [DISTWIDTH] IN BLOOD BY AUTOMATED COUNT: 15.3 % (ref 11.5–14.5)
GLUCOSE BLD MANUAL STRIP-MCNC: 194 MG/DL (ref 74–99)
GLUCOSE BLD MANUAL STRIP-MCNC: 229 MG/DL (ref 74–99)
GLUCOSE BLD MANUAL STRIP-MCNC: 272 MG/DL (ref 74–99)
GLUCOSE BLD MANUAL STRIP-MCNC: 283 MG/DL (ref 74–99)
GLUCOSE SERPL-MCNC: 206 MG/DL (ref 74–99)
GLUCOSE UR STRIP.AUTO-MCNC: ABNORMAL MG/DL
GLUCOSE UR STRIP.AUTO-MCNC: ABNORMAL MG/DL
HCT VFR BLD AUTO: 38.4 % (ref 36–46)
HGB BLD-MCNC: 11.3 G/DL (ref 12–16)
HOLD SPECIMEN: NORMAL
KETONES UR STRIP.AUTO-MCNC: ABNORMAL MG/DL
KETONES UR STRIP.AUTO-MCNC: ABNORMAL MG/DL
LEUKOCYTE ESTERASE UR QL STRIP.AUTO: ABNORMAL
LEUKOCYTE ESTERASE UR QL STRIP.AUTO: ABNORMAL
MAGNESIUM SERPL-MCNC: 2.32 MG/DL (ref 1.6–2.4)
MCH RBC QN AUTO: 25.4 PG (ref 26–34)
MCHC RBC AUTO-ENTMCNC: 29.4 G/DL (ref 32–36)
MCV RBC AUTO: 86 FL (ref 80–100)
MUCOUS THREADS #/AREA URNS AUTO: ABNORMAL /LPF
NITRITE UR QL STRIP.AUTO: NEGATIVE
NITRITE UR QL STRIP.AUTO: NEGATIVE
NRBC BLD-RTO: 0 /100 WBCS (ref 0–0)
PH UR STRIP.AUTO: 6 [PH]
PH UR STRIP.AUTO: 6 [PH]
PHOSPHATE SERPL-MCNC: 3.8 MG/DL (ref 2.5–4.9)
PLATELET # BLD AUTO: 258 X10*3/UL (ref 150–450)
POTASSIUM SERPL-SCNC: 4.7 MMOL/L (ref 3.5–5.3)
PROT UR STRIP.AUTO-MCNC: ABNORMAL MG/DL
PROT UR STRIP.AUTO-MCNC: ABNORMAL MG/DL
Q ONSET: 219 MS
QRS COUNT: 17 BEATS
QRS DURATION: 96 MS
QT INTERVAL: 372 MS
QTC CALCULATION(BAZETT): 482 MS
QTC FREDERICIA: 442 MS
R AXIS: 83 DEGREES
RBC # BLD AUTO: 4.45 X10*6/UL (ref 4–5.2)
RBC # UR STRIP.AUTO: ABNORMAL /UL
RBC # UR STRIP.AUTO: ABNORMAL /UL
RBC #/AREA URNS AUTO: >20 /HPF
RBC #/AREA URNS AUTO: >20 /HPF
SODIUM SERPL-SCNC: 141 MMOL/L (ref 136–145)
SP GR UR STRIP.AUTO: 1.03
SP GR UR STRIP.AUTO: 1.03
SQUAMOUS #/AREA URNS AUTO: ABNORMAL /HPF
SQUAMOUS #/AREA URNS AUTO: ABNORMAL /HPF
T AXIS: 90 DEGREES
T OFFSET: 405 MS
UROBILINOGEN UR STRIP.AUTO-MCNC: NORMAL MG/DL
UROBILINOGEN UR STRIP.AUTO-MCNC: NORMAL MG/DL
VENTRICULAR RATE: 101 BPM
WBC # BLD AUTO: 12.6 X10*3/UL (ref 4.4–11.3)
WBC #/AREA URNS AUTO: >50 /HPF
WBC #/AREA URNS AUTO: >50 /HPF
WBC CLUMPS #/AREA URNS AUTO: ABNORMAL /HPF
WBC CLUMPS #/AREA URNS AUTO: ABNORMAL /HPF

## 2024-12-30 PROCEDURE — 82947 ASSAY GLUCOSE BLOOD QUANT: CPT

## 2024-12-30 PROCEDURE — 36415 COLL VENOUS BLD VENIPUNCTURE: CPT

## 2024-12-30 PROCEDURE — 72170 X-RAY EXAM OF PELVIS: CPT | Performed by: RADIOLOGY

## 2024-12-30 PROCEDURE — 3700000001 HC GENERAL ANESTHESIA TIME - INITIAL BASE CHARGE: Performed by: ORTHOPAEDIC SURGERY

## 2024-12-30 PROCEDURE — 2500000004 HC RX 250 GENERAL PHARMACY W/ HCPCS (ALT 636 FOR OP/ED): Performed by: STUDENT IN AN ORGANIZED HEALTH CARE EDUCATION/TRAINING PROGRAM

## 2024-12-30 PROCEDURE — 1200000002 HC GENERAL ROOM WITH TELEMETRY DAILY

## 2024-12-30 PROCEDURE — 2500000004 HC RX 250 GENERAL PHARMACY W/ HCPCS (ALT 636 FOR OP/ED): Performed by: NURSE PRACTITIONER

## 2024-12-30 PROCEDURE — 85027 COMPLETE CBC AUTOMATED: CPT | Performed by: NURSE PRACTITIONER

## 2024-12-30 PROCEDURE — 2500000002 HC RX 250 W HCPCS SELF ADMINISTERED DRUGS (ALT 637 FOR MEDICARE OP, ALT 636 FOR OP/ED): Performed by: NURSE PRACTITIONER

## 2024-12-30 PROCEDURE — 3700000002 HC GENERAL ANESTHESIA TIME - EACH INCREMENTAL 1 MINUTE: Performed by: ORTHOPAEDIC SURGERY

## 2024-12-30 PROCEDURE — 2500000004 HC RX 250 GENERAL PHARMACY W/ HCPCS (ALT 636 FOR OP/ED): Performed by: NURSE ANESTHETIST, CERTIFIED REGISTERED

## 2024-12-30 PROCEDURE — 2780000003 HC OR 278 NO HCPCS: Performed by: ORTHOPAEDIC SURGERY

## 2024-12-30 PROCEDURE — 84484 ASSAY OF TROPONIN QUANT: CPT

## 2024-12-30 PROCEDURE — 7100000001 HC RECOVERY ROOM TIME - INITIAL BASE CHARGE: Performed by: ORTHOPAEDIC SURGERY

## 2024-12-30 PROCEDURE — 7100000002 HC RECOVERY ROOM TIME - EACH INCREMENTAL 1 MINUTE: Performed by: ORTHOPAEDIC SURGERY

## 2024-12-30 PROCEDURE — 3600000005 HC OR TIME - INITIAL BASE CHARGE - PROCEDURE LEVEL FIVE: Performed by: ORTHOPAEDIC SURGERY

## 2024-12-30 PROCEDURE — 72170 X-RAY EXAM OF PELVIS: CPT

## 2024-12-30 PROCEDURE — 87086 URINE CULTURE/COLONY COUNT: CPT | Mod: STJLAB | Performed by: NURSE PRACTITIONER

## 2024-12-30 PROCEDURE — 80048 BASIC METABOLIC PNL TOTAL CA: CPT | Performed by: NURSE PRACTITIONER

## 2024-12-30 PROCEDURE — 83735 ASSAY OF MAGNESIUM: CPT

## 2024-12-30 PROCEDURE — 3600000010 HC OR TIME - EACH INCREMENTAL 1 MINUTE - PROCEDURE LEVEL FIVE: Performed by: ORTHOPAEDIC SURGERY

## 2024-12-30 PROCEDURE — 2500000004 HC RX 250 GENERAL PHARMACY W/ HCPCS (ALT 636 FOR OP/ED)

## 2024-12-30 PROCEDURE — 2500000005 HC RX 250 GENERAL PHARMACY W/O HCPCS: Performed by: NURSE PRACTITIONER

## 2024-12-30 PROCEDURE — 2720000007 HC OR 272 NO HCPCS: Performed by: ORTHOPAEDIC SURGERY

## 2024-12-30 PROCEDURE — 2500000001 HC RX 250 WO HCPCS SELF ADMINISTERED DRUGS (ALT 637 FOR MEDICARE OP)

## 2024-12-30 PROCEDURE — 2500000005 HC RX 250 GENERAL PHARMACY W/O HCPCS: Performed by: NURSE ANESTHETIST, CERTIFIED REGISTERED

## 2024-12-30 PROCEDURE — 84100 ASSAY OF PHOSPHORUS: CPT

## 2024-12-30 PROCEDURE — 0SRS03Z REPLACEMENT OF LEFT HIP JOINT, FEMORAL SURFACE WITH CERAMIC SYNTHETIC SUBSTITUTE, OPEN APPROACH: ICD-10-PCS | Performed by: ORTHOPAEDIC SURGERY

## 2024-12-30 PROCEDURE — 81003 URINALYSIS AUTO W/O SCOPE: CPT

## 2024-12-30 PROCEDURE — C1776 JOINT DEVICE (IMPLANTABLE): HCPCS | Performed by: ORTHOPAEDIC SURGERY

## 2024-12-30 PROCEDURE — 81001 URINALYSIS AUTO W/SCOPE: CPT | Performed by: NURSE PRACTITIONER

## 2024-12-30 DEVICE — NECK ADJUSTMENT SLEEVE
Type: IMPLANTABLE DEVICE | Site: HIP | Status: FUNCTIONAL
Brand: UNITRAX

## 2024-12-30 DEVICE — IMPLANTABLE DEVICE: Type: IMPLANTABLE DEVICE | Site: HIP | Status: FUNCTIONAL

## 2024-12-30 DEVICE — HEAD COMPONENT
Type: IMPLANTABLE DEVICE | Site: HIP | Status: FUNCTIONAL
Brand: UNITRAX

## 2024-12-30 RX ORDER — CEFAZOLIN SODIUM 2 G/100ML
INJECTION, SOLUTION INTRAVENOUS AS NEEDED
Status: DISCONTINUED | OUTPATIENT
Start: 2024-12-30 | End: 2024-12-30

## 2024-12-30 RX ORDER — TRANEXAMIC ACID 100 MG/ML
INJECTION, SOLUTION INTRAVENOUS AS NEEDED
Status: DISCONTINUED | OUTPATIENT
Start: 2024-12-30 | End: 2024-12-30

## 2024-12-30 RX ORDER — FENTANYL CITRATE 50 UG/ML
INJECTION, SOLUTION INTRAMUSCULAR; INTRAVENOUS AS NEEDED
Status: DISCONTINUED | OUTPATIENT
Start: 2024-12-30 | End: 2024-12-30

## 2024-12-30 RX ORDER — CEFAZOLIN SODIUM 2 G/100ML
2 INJECTION, SOLUTION INTRAVENOUS EVERY 12 HOURS
Status: COMPLETED | OUTPATIENT
Start: 2024-12-31 | End: 2024-12-31

## 2024-12-30 RX ORDER — FENTANYL CITRATE 50 UG/ML
25 INJECTION, SOLUTION INTRAMUSCULAR; INTRAVENOUS EVERY 5 MIN PRN
Status: DISCONTINUED | OUTPATIENT
Start: 2024-12-30 | End: 2024-12-30 | Stop reason: HOSPADM

## 2024-12-30 RX ORDER — SODIUM CHLORIDE, SODIUM LACTATE, POTASSIUM CHLORIDE, CALCIUM CHLORIDE 600; 310; 30; 20 MG/100ML; MG/100ML; MG/100ML; MG/100ML
100 INJECTION, SOLUTION INTRAVENOUS CONTINUOUS
Status: DISCONTINUED | OUTPATIENT
Start: 2024-12-30 | End: 2024-12-30 | Stop reason: HOSPADM

## 2024-12-30 RX ORDER — SODIUM CHLORIDE, SODIUM GLUCONATE, SODIUM ACETATE, POTASSIUM CHLORIDE AND MAGNESIUM CHLORIDE 30; 37; 368; 526; 502 MG/100ML; MG/100ML; MG/100ML; MG/100ML; MG/100ML
INJECTION, SOLUTION INTRAVENOUS CONTINUOUS PRN
Status: DISCONTINUED | OUTPATIENT
Start: 2024-12-30 | End: 2024-12-30

## 2024-12-30 RX ORDER — SODIUM CHLORIDE, SODIUM LACTATE, POTASSIUM CHLORIDE, CALCIUM CHLORIDE 600; 310; 30; 20 MG/100ML; MG/100ML; MG/100ML; MG/100ML
50 INJECTION, SOLUTION INTRAVENOUS CONTINUOUS
Status: ACTIVE | OUTPATIENT
Start: 2024-12-30 | End: 2024-12-31

## 2024-12-30 RX ORDER — LIDOCAINE HYDROCHLORIDE 20 MG/ML
INJECTION, SOLUTION INFILTRATION; PERINEURAL AS NEEDED
Status: DISCONTINUED | OUTPATIENT
Start: 2024-12-30 | End: 2024-12-30

## 2024-12-30 RX ORDER — ALBUTEROL SULFATE 0.83 MG/ML
2.5 SOLUTION RESPIRATORY (INHALATION) ONCE AS NEEDED
Status: DISCONTINUED | OUTPATIENT
Start: 2024-12-30 | End: 2024-12-30 | Stop reason: HOSPADM

## 2024-12-30 RX ORDER — ONDANSETRON HYDROCHLORIDE 2 MG/ML
4 INJECTION, SOLUTION INTRAVENOUS EVERY 6 HOURS PRN
Status: DISCONTINUED | OUTPATIENT
Start: 2024-12-30 | End: 2025-01-07 | Stop reason: HOSPADM

## 2024-12-30 RX ORDER — ROCURONIUM BROMIDE 10 MG/ML
INJECTION, SOLUTION INTRAVENOUS AS NEEDED
Status: DISCONTINUED | OUTPATIENT
Start: 2024-12-30 | End: 2024-12-30

## 2024-12-30 RX ORDER — PROPOFOL 10 MG/ML
INJECTION, EMULSION INTRAVENOUS AS NEEDED
Status: DISCONTINUED | OUTPATIENT
Start: 2024-12-30 | End: 2024-12-30

## 2024-12-30 RX ORDER — HYDROMORPHONE HYDROCHLORIDE 0.2 MG/ML
0.2 INJECTION INTRAMUSCULAR; INTRAVENOUS; SUBCUTANEOUS EVERY 5 MIN PRN
Status: DISCONTINUED | OUTPATIENT
Start: 2024-12-30 | End: 2024-12-30 | Stop reason: HOSPADM

## 2024-12-30 RX ORDER — FUROSEMIDE 40 MG/1
40 TABLET ORAL DAILY
Status: DISCONTINUED | OUTPATIENT
Start: 2024-12-31 | End: 2025-01-07 | Stop reason: HOSPADM

## 2024-12-30 RX ORDER — MIDAZOLAM HYDROCHLORIDE 1 MG/ML
INJECTION, SOLUTION INTRAMUSCULAR; INTRAVENOUS AS NEEDED
Status: DISCONTINUED | OUTPATIENT
Start: 2024-12-30 | End: 2024-12-30

## 2024-12-30 RX ORDER — PHENYLEPHRINE HYDROCHLORIDE 10 MG/ML
INJECTION INTRAVENOUS AS NEEDED
Status: DISCONTINUED | OUTPATIENT
Start: 2024-12-30 | End: 2024-12-30

## 2024-12-30 RX ORDER — OXYCODONE HYDROCHLORIDE 5 MG/1
5 TABLET ORAL EVERY 4 HOURS PRN
Status: DISCONTINUED | OUTPATIENT
Start: 2024-12-30 | End: 2024-12-30 | Stop reason: HOSPADM

## 2024-12-30 RX ORDER — ONDANSETRON HYDROCHLORIDE 2 MG/ML
INJECTION, SOLUTION INTRAVENOUS AS NEEDED
Status: DISCONTINUED | OUTPATIENT
Start: 2024-12-30 | End: 2024-12-30

## 2024-12-30 RX ORDER — CEFTRIAXONE 1 G/50ML
1 INJECTION, SOLUTION INTRAVENOUS EVERY 24 HOURS
Status: COMPLETED | OUTPATIENT
Start: 2024-12-30 | End: 2025-01-03

## 2024-12-30 RX ORDER — ONDANSETRON HYDROCHLORIDE 2 MG/ML
4 INJECTION, SOLUTION INTRAVENOUS ONCE AS NEEDED
Status: DISCONTINUED | OUTPATIENT
Start: 2024-12-30 | End: 2024-12-30 | Stop reason: HOSPADM

## 2024-12-30 RX ORDER — INSULIN LISPRO 100 [IU]/ML
0-10 INJECTION, SOLUTION INTRAVENOUS; SUBCUTANEOUS
Status: DISCONTINUED | OUTPATIENT
Start: 2024-12-30 | End: 2025-01-07 | Stop reason: HOSPADM

## 2024-12-30 RX ORDER — HYDRALAZINE HYDROCHLORIDE 20 MG/ML
10 INJECTION INTRAMUSCULAR; INTRAVENOUS EVERY 30 MIN PRN
Status: DISCONTINUED | OUTPATIENT
Start: 2024-12-30 | End: 2024-12-30 | Stop reason: HOSPADM

## 2024-12-30 RX ORDER — LIDOCAINE HYDROCHLORIDE 10 MG/ML
0.1 INJECTION, SOLUTION INFILTRATION; PERINEURAL ONCE
Status: DISCONTINUED | OUTPATIENT
Start: 2024-12-30 | End: 2024-12-30 | Stop reason: HOSPADM

## 2024-12-30 RX ORDER — MEPERIDINE HYDROCHLORIDE 50 MG/ML
12.5 INJECTION INTRAMUSCULAR; INTRAVENOUS; SUBCUTANEOUS EVERY 10 MIN PRN
Status: DISCONTINUED | OUTPATIENT
Start: 2024-12-30 | End: 2024-12-30 | Stop reason: HOSPADM

## 2024-12-30 RX ORDER — MIDAZOLAM HYDROCHLORIDE 1 MG/ML
1 INJECTION, SOLUTION INTRAMUSCULAR; INTRAVENOUS ONCE AS NEEDED
Status: DISCONTINUED | OUTPATIENT
Start: 2024-12-30 | End: 2024-12-30 | Stop reason: HOSPADM

## 2024-12-30 RX ORDER — LABETALOL HYDROCHLORIDE 5 MG/ML
5 INJECTION, SOLUTION INTRAVENOUS ONCE AS NEEDED
Status: COMPLETED | OUTPATIENT
Start: 2024-12-30 | End: 2024-12-30

## 2024-12-30 RX ADMIN — MORPHINE SULFATE 2 MG: 2 INJECTION, SOLUTION INTRAMUSCULAR; INTRAVENOUS at 18:55

## 2024-12-30 RX ADMIN — BRIMONIDINE TARTRATE 1 DROP: 2 SOLUTION/ DROPS OPHTHALMIC at 09:11

## 2024-12-30 RX ADMIN — DEXAMETHASONE SODIUM PHOSPHATE 4 MG: 4 INJECTION INTRA-ARTICULAR; INTRALESIONAL; INTRAMUSCULAR; INTRAVENOUS; SOFT TISSUE at 15:33

## 2024-12-30 RX ADMIN — ONDANSETRON 4 MG: 2 INJECTION, SOLUTION INTRAMUSCULAR; INTRAVENOUS at 15:33

## 2024-12-30 RX ADMIN — INSULIN LISPRO 6 UNITS: 100 INJECTION, SOLUTION INTRAVENOUS; SUBCUTANEOUS at 22:20

## 2024-12-30 RX ADMIN — MORPHINE SULFATE 2 MG: 2 INJECTION, SOLUTION INTRAMUSCULAR; INTRAVENOUS at 03:57

## 2024-12-30 RX ADMIN — DORZOLAMIDE HYDROCHLORIDE AND TIMOLOL MALEATE 1 DROP: 20; 5 SOLUTION/ DROPS OPHTHALMIC at 09:11

## 2024-12-30 RX ADMIN — ROSUVASTATIN CALCIUM 5 MG: 5 TABLET, FILM COATED ORAL at 22:19

## 2024-12-30 RX ADMIN — PHENYLEPHRINE HYDROCHLORIDE 200 MCG: 10 INJECTION INTRAVENOUS at 15:27

## 2024-12-30 RX ADMIN — MIDAZOLAM 0.5 MG: 1 INJECTION INTRAMUSCULAR; INTRAVENOUS at 14:19

## 2024-12-30 RX ADMIN — PHENYLEPHRINE HYDROCHLORIDE 200 MCG: 10 INJECTION INTRAVENOUS at 14:42

## 2024-12-30 RX ADMIN — PHENYLEPHRINE HYDROCHLORIDE 200 MCG: 10 INJECTION INTRAVENOUS at 15:22

## 2024-12-30 RX ADMIN — ROCURONIUM BROMIDE 50 MG: 10 INJECTION INTRAVENOUS at 14:17

## 2024-12-30 RX ADMIN — FUROSEMIDE 40 MG: 40 TABLET ORAL at 09:11

## 2024-12-30 RX ADMIN — CEFTRIAXONE SODIUM 1 G: 1 INJECTION, SOLUTION INTRAVENOUS at 22:18

## 2024-12-30 RX ADMIN — SODIUM CHLORIDE, SODIUM GLUCONATE, SODIUM ACETATE, POTASSIUM CHLORIDE AND MAGNESIUM CHLORIDE: 526; 502; 368; 37; 30 INJECTION, SOLUTION INTRAVENOUS at 14:13

## 2024-12-30 RX ADMIN — ONDANSETRON 4 MG: 2 INJECTION INTRAMUSCULAR; INTRAVENOUS at 09:21

## 2024-12-30 RX ADMIN — PROPOFOL 100 MG: 10 INJECTION, EMULSION INTRAVENOUS at 14:17

## 2024-12-30 RX ADMIN — FENTANYL CITRATE 50 MCG: 50 INJECTION, SOLUTION INTRAMUSCULAR; INTRAVENOUS at 14:17

## 2024-12-30 RX ADMIN — SODIUM CHLORIDE, POTASSIUM CHLORIDE, SODIUM LACTATE AND CALCIUM CHLORIDE 50 ML/HR: 600; 310; 30; 20 INJECTION, SOLUTION INTRAVENOUS at 18:55

## 2024-12-30 RX ADMIN — LIDOCAINE 4% 2 PATCH: 40 PATCH TOPICAL at 22:19

## 2024-12-30 RX ADMIN — CEFAZOLIN SODIUM 2 G: 2 INJECTION, SOLUTION INTRAVENOUS at 14:28

## 2024-12-30 RX ADMIN — LABETALOL HYDROCHLORIDE 5 MG: 5 INJECTION, SOLUTION INTRAVENOUS at 16:46

## 2024-12-30 RX ADMIN — LIDOCAINE HYDROCHLORIDE 60 MG: 20 INJECTION, SOLUTION INFILTRATION; PERINEURAL at 14:17

## 2024-12-30 RX ADMIN — SUGAMMADEX 200 MG: 100 INJECTION, SOLUTION INTRAVENOUS at 15:41

## 2024-12-30 RX ADMIN — TRANEXAMIC ACID 1000 MG: 1 INJECTION, SOLUTION INTRAVENOUS at 14:17

## 2024-12-30 SDOH — HEALTH STABILITY: MENTAL HEALTH: CURRENT SMOKER: 0

## 2024-12-30 ASSESSMENT — COGNITIVE AND FUNCTIONAL STATUS - GENERAL
TURNING FROM BACK TO SIDE WHILE IN FLAT BAD: A LOT
PERSONAL GROOMING: A LITTLE
DRESSING REGULAR LOWER BODY CLOTHING: A LOT
TOILETING: A LOT
DAILY ACTIVITIY SCORE: 14
WALKING IN HOSPITAL ROOM: TOTAL
MOVING TO AND FROM BED TO CHAIR: A LOT
DRESSING REGULAR UPPER BODY CLOTHING: A LOT
HELP NEEDED FOR BATHING: A LOT
STANDING UP FROM CHAIR USING ARMS: TOTAL
CLIMB 3 TO 5 STEPS WITH RAILING: TOTAL
MOBILITY SCORE: 9
EATING MEALS: A LITTLE
MOVING FROM LYING ON BACK TO SITTING ON SIDE OF FLAT BED WITH BEDRAILS: A LOT

## 2024-12-30 ASSESSMENT — ENCOUNTER SYMPTOMS
NERVOUS/ANXIOUS: 0
HEMATURIA: 0
PALPITATIONS: 0
CONFUSION: 0
ABDOMINAL PAIN: 0
WOUND: 0
AGITATION: 0
LIGHT-HEADEDNESS: 0
FREQUENCY: 0
TROUBLE SWALLOWING: 0
DYSPHORIC MOOD: 0
SORE THROAT: 0
BACK PAIN: 0
CHILLS: 0
COUGH: 0
DIARRHEA: 0
FEVER: 0
FLANK PAIN: 0
SHORTNESS OF BREATH: 0
DYSURIA: 0
CONSTIPATION: 0
CHEST TIGHTNESS: 0
WEAKNESS: 0
DIZZINESS: 0
FATIGUE: 0
COLOR CHANGE: 0
NAUSEA: 1

## 2024-12-30 ASSESSMENT — PAIN SCALES - GENERAL
PAINLEVEL_OUTOF10: 4
PAINLEVEL_OUTOF10: 0 - NO PAIN
PAINLEVEL_OUTOF10: 2
PAINLEVEL_OUTOF10: 0 - NO PAIN
PAINLEVEL_OUTOF10: 0 - NO PAIN
PAIN_LEVEL: 0
PAINLEVEL_OUTOF10: 8
PAINLEVEL_OUTOF10: 10 - WORST POSSIBLE PAIN
PAINLEVEL_OUTOF10: 0 - NO PAIN

## 2024-12-30 ASSESSMENT — PAIN - FUNCTIONAL ASSESSMENT
PAIN_FUNCTIONAL_ASSESSMENT: 0-10
PAIN_FUNCTIONAL_ASSESSMENT: WONG-BAKER FACES
PAIN_FUNCTIONAL_ASSESSMENT: 0-10

## 2024-12-30 ASSESSMENT — PAIN DESCRIPTION - ORIENTATION: ORIENTATION: LEFT

## 2024-12-30 ASSESSMENT — PAIN DESCRIPTION - LOCATION: LOCATION: LEG

## 2024-12-30 ASSESSMENT — PAIN SCALES - WONG BAKER: WONGBAKER_NUMERICALRESPONSE: NO HURT

## 2024-12-30 NOTE — ANESTHESIA PREPROCEDURE EVALUATION
Patient: Dena Short    Procedure Information       Anesthesia Start Date/Time: 12/30/24 1413    Procedure: Hip Hemiarthroplasty         4 PM REQUEST (Left: Hip)    Location: [facility] OR 02 / [facility]    Surgeons: Dane Vega MD            Relevant Problems   Cardiac   (+) Atrial fibrillation (Multi)   (+) CAD (coronary artery disease)   (+) Cardiac arrhythmia   (+) Critical lower limb ischemia (Multi)   (+) Essential hypertension, benign   (+) PAD (peripheral artery disease) (CMS-HCC)   (+) Sinus bradycardia      Endocrine   (+) Obesity, unspecified      Hematology   (+) Anemia      Musculoskeletal   (+) Osteoarthritis of lumbar spine      HEENT   (+) Primary open angle glaucoma of both eyes, mild stage      Skin   (+) Basal cell carcinoma (BCC) of face       Clinical information reviewed:   Tobacco  Allergies  Meds  Problems  Med Hx  Surg Hx   Fam Hx  Soc   Hx        NPO Detail:  No data recorded     Physical Exam    Airway  Mallampati: II  TM distance: >3 FB     Cardiovascular - normal exam  Rhythm: regular  Rate: normal     Dental - normal exam     Pulmonary - normal exam     Abdominal - normal exam  Abdomen: soft           Anesthesia Plan    History of general anesthesia?: yes  History of complications of general anesthesia?: no    ASA 3     general     The patient is not a current smoker.  Patient was previously instructed to abstain from smoking on day of procedure.  Patient did not smoke on day of procedure.    intravenous induction   Postoperative administration of opioids is intended.  Anesthetic plan and risks discussed with patient.  Use of blood products discussed with patient who.    Plan discussed with CAA.

## 2024-12-30 NOTE — BRIEF OP NOTE
Date: 2024 - 2024  OR Location: [facility]    Name: Dena Short : 1931, Age: 89 y.o., MRN: [id number], Sex: female    Diagnosis  Pre-op Diagnosis      * Closed fracture of left hip, initial encounter [S72.002A] Post-op Diagnosis     * Closed fracture of left hip, initial encounter [S72.002A]     Procedures  Hip Hemiarthroplasty         4 PM REQUEST  29148 - OR HEMIARTHROPLASTY HIP PARTIAL      Surgeons      * Dane Vega - Primary    Resident/Fellow/Other Assistant:  Surgeons and Role:  * No surgeons found with a matching role *    Staff:   Circulator: Libia  Scrub Person: Karina  Scrub Person: Latia Quinteroub Person: Tomas López Person: Tanya    Anesthesia Staff: Anesthesiologist: Gilberto Moran DO  CRNA: MONA Gardner-GISSELLE    Procedure Summary  Anesthesia: Anesthesia type not filed in the log.  ASA: ASA status not filed in the log.  Estimated Blood Loss: 50ccmL  Intra-op Medications:   Administrations occurring from 1420 to 1630 on 24:   Medication Name Total Dose   ceFAZolin (Ancef) IV 2 g in 100 mL dextrose (iso) - premix 2 g   insulin lispro protamin-lispro (HumaLOG Mix 75-25) 100 unit/mL (75-25) injection 15 Units Cannot be calculated   phenylephrine (Tariq-Synephrine) injection 600 mcg              Anesthesia Record               Intraprocedure I/O Totals          Intake    Tranexamic Acid 0.00 mL    The total shown is the total volume documented since Anesthesia Start was filed.    Total Intake 0 mL          Specimen: No specimens collected               Findings: See op note    Complications:  None; patient tolerated the procedure well.     Disposition: PACU - hemodynamically stable.  Condition: stable  Specimens Collected: No specimens collected  Attending Attestation:     Dane Vega  Phone Number: [phone]

## 2024-12-30 NOTE — CONSULTS
Consults    I48.91 Atrial fibrillation RVR  I25.1 CAD prior stents prior non-STEMI  I50.3 Diastolic heart failure  I79 previous stent right SFA popliteal artery angioplasty right PT peroneal artery  N18.for stage IV chronic kidney disease  E11.9 Type 2 diabetes  W19.X XA mechanical fall at home hip fracture    echocardiogram     continue rate control with Cardizem     losartan for blood pressure as needed     we will hold the amiodarone as she will be off anticoagulation we will resume anticoagulation and amiodarone postoperatively and consider the potential of cardioversion at later date      History Of Present Illness:    Dena Short is a 89 y.o. female presenting with coronary artery disease prior stents non-STEMI, atrial fibrillation paroxysmal on Xarelto prior cardioversion now rate control and antiarrhythmic agents discontinued by patient prior cardiomyopathy sinus arrhythmia previous PAD stenting right SFA popliteal angioplasty right PT peroneal arteries, stage IV chronic kidney disease, type 2 diabetes presented to the ED yesterday after a fall in the kitchen at home nonsyncopal with hip fracture last Xarelto dose was Saturday    She took her morning medicines including insulin at 9 AM with preparing breakfast when she said her legs gave out as she attempted to turn too fast she tripped and fell on her left side resulting in hip fracture her last Xarelto dose was Saturday she presented with throbbing left-sided hip pain.  She has a prior history of right hemiarthroplasty 2021 by Dr. Zamora x-ray showed mild angulated left subcapital femoral neck fracture cardiac clearance for today or tomorrow requested    According to Tracy Schwab she presented with non-STEMI February 2018 A-fib RVR EF 30-35 pharmacologic stress test showed anterolateral scar 28% EF was treated with metoprolol Xarelto and Cardizem she was subsequently on amiodarone 200 daily    In November 2021 she had a fall at the facility and her  right toes turned purple she was at [facility] and underwent stenting there of right SFA popliteal and posterior tibial peroneal angioplasty    She was seen by Tracy Schwab again in February 2024 and had not been taking her cardiac medicines for over about her home health nurse practitioner contacted Tracy Schwab 530 states that her heart rate had been in the 30-40 at that time when seen by Olga sanchez she was in sinus arrhythmia with a heart rate of 59.  She is to be on cardiac 120 amiodarone 200 Lasix 40 daily losartan 50 and Xarelto 10 daily    She reportedly had 4 shocks for cardioversion in 2018 noted to be poorly compliant with her medications her echo in May 2021 was recovered to 55 to 60%    I will order an echocardiogram continue rate control with Cardizem and losartan for blood pressure as needed we will hold the amiodarone as she will be off anticoagulation we will resume anticoagulation and amiodarone postoperatively and consider the potential of cardioversion at later date     Last Recorded Vitals:  Vitals:    12/29/24 2100 12/30/24 0000 12/30/24 0400 12/30/24 0800   BP: 104/83 121/66 138/65    BP Location: Right arm Right arm Right arm    Patient Position: Lying Lying Lying    Pulse: 106 82 88 81   Resp: 18 16 16 18   Temp: 36.3 °C (97.3 °F) 36.1 °C (97 °F) 35.9 °C (96.6 °F) 36.2 °C (97.2 °F)   TempSrc: Temporal Temporal Temporal Temporal   SpO2: 98% 97% 96% 95%   Weight: 73 kg (160 lb 15 oz)      Height: 1.524 m (5')          Last Labs:  CBC - 12/30/2024:  6:40 AM  12.6 11.3 258    38.4      CMP - 12/30/2024:  6:40 AM  9.3 7.7 23 --- 1.4   3.8 4.3 14 96      PTT - No results in last year.  1.8   20.0 _     Troponin I, High Sensitivity   Date/Time Value Ref Range Status   12/30/2024 06:40  (HH) 0 - 13 ng/L Final     Comment:     Previous result verified on 12/29/2024 2026 on specimen/case [id number] called with component [facility] for procedure Troponin I, High Sensitivity with value 131  ng/L.   12/29/2024 10:29  (HH) 0 - 13 ng/L Final     Comment:     Previous result verified on 12/29/2024 2026 on specimen/case [id number] called with component [facility] for procedure Troponin I, High Sensitivity with value 131 ng/L.   12/29/2024 07:49  (HH) 0 - 13 ng/L Final     BNP   Date/Time Value Ref Range Status   10/06/2020 12:42  (H) 0 - 99 pg/mL Final     Comment:     .  <100 pg/mL - Heart failure unlikely  100-299 pg/mL - Intermediate probability of acute heart  .               failure exacerbation. Correlate with clinical  .               context and patient history.    >=300 pg/mL - Heart Failure likely. Correlate with clinical  .               context and patient history.  BNP testing is performed using different testing   methodology at [facility] than at other   [facility]. Direct result comparisons should   only be made within the same method.     05/15/2020 02:01 PM 1,116 (H) 0 - 99 pg/mL Final     Comment:     .  <100 pg/mL - Heart failure unlikely  100-299 pg/mL - Intermediate probability of acute heart  .               failure exacerbation. Correlate with clinical  .               context and patient history.    >=300 pg/mL - Heart Failure likely. Correlate with clinical  .               context and patient history.   Biotin interference may cause falsely decreased results.   Patients taking a Biotin dose of up to 5 mg/day should   refrain from taking Biotin for 24 hours before sample   collection. Providers may contact their local laboratory   for further information.       Hemoglobin A1C   Date/Time Value Ref Range Status   05/15/2020 02:01 PM 6.6 % Final     Comment:          Diagnosis of Diabetes-Adults   Non-Diabetic: < or = 5.6%   Increased risk for developing diabetes: 5.7-6.4%   Diagnostic of diabetes: > or = 6.5%  .       Monitoring of Diabetes                Age (y)     Therapeutic Goal (%)   Adults:          >18           <7.0   Pediatrics:     "13-18           <7.5                   7-12           <8.0                   0- 6            7.5-8.5   American Diabetes Association. Diabetes Care 33(S1), Jan 2010.     07/04/2019 05:18 AM 7.5 % Final     Comment:          Diagnosis of Diabetes-Adults   Non-Diabetic: < or = 5.6%   Increased risk for developing diabetes: 5.7-6.4%   Diagnostic of diabetes: > or = 6.5%  .       Monitoring of Diabetes                Age (y)     Therapeutic Goal (%)   Adults:          >18           <7.0   Pediatrics:    13-18           <7.5                   7-12           <8.0                   0- 6            7.5-8.5   American Diabetes Association. Diabetes Care 33(S1), Jan 2010.       VLDL   Date/Time Value Ref Range Status   05/15/2020 02:01 PM 28 0 - 40 mg/dL Final   08/14/2018 08:31 AM 23 0 - 40 mg/dL Final      Last I/O:  I/O last 3 completed shifts:  In: 336.7 (4.6 mL/kg) [I.V.:336.7 (4.6 mL/kg)]  Out: - (0 mL/kg)   Weight: 73 kg     Past Cardiology Tests (Last 3 Years):  EKG:  ECG 12 lead (Clinic Performed) 06/05/2024      ECG 12 lead (Clinic Performed) 02/06/2024    Echo:  No results found for this or any previous visit from the past 1095 days.    Ejection Fractions:  No results found for: \"EF\"  Cath:  No results found for this or any previous visit from the past 1095 days.    Stress Test:  No results found for this or any previous visit from the past 1095 days.    Cardiac Imaging:  No results found for this or any previous visit from the past 1095 days.      Past Medical History:  She has a past medical history of Basal cell carcinoma, Bradycardia, CAD (coronary artery disease), Cardiomyopathy (04/09/2018), Choroidal detachment, hemorrhagic, right eye, Chronic diastolic heart failure, CKD (chronic kidney disease), stage IV (Multi), Hip fracture, right (02/01/2024), Insulin dependent type 2 diabetes mellitus (Multi), NSTEMI (non-ST elevated myocardial infarction) (Multi), PAD (peripheral artery disease) (CMS-HCC), Paroxysmal " atrial fibrillation (Multi), and Sinus arrhythmia.    Past Surgical History:  She has a past surgical history that includes Other surgical history (01/27/2022); Cholecystectomy (09/14/2018); Other surgical history (09/14/2018); Coronary stent placement; Cardiac catheterization; Cardioversion; and Hip fracture surgery (Right).      Social History:  She reports that she has never smoked. She has never used smokeless tobacco. She reports that she does not drink alcohol and does not use drugs.    Family History:  Family History   Problem Relation Name Age of Onset    Coronary artery disease Father          Allergies:  Patient has no known allergies.    Inpatient Medications:  Scheduled medications   Medication Dose Route Frequency    brimonidine  1 drop Left Eye BID    dorzolamide-timoloL  1 drop Left Eye BID    furosemide  40 mg oral Daily    insulin lispro  0-10 Units subcutaneous q6h    [Held by provider] insulin lispro protamin-lispro  15 Units subcutaneous BID AC    lidocaine  2 patch transdermal Nightly    [Held by provider] rivaroxaban  10 mg oral Daily    rosuvastatin  5 mg oral q PM     PRN medications   Medication    acetaminophen    Or    acetaminophen    Or    acetaminophen    dextrose    dextrose    glucagon    glucagon    melatonin    morphine    morphine    oxygen    polyethylene glycol     Continuous Medications   Medication Dose Last Rate    lactated Ringer's  50 mL/hr 50 mL/hr (12/30/24 0515)     Outpatient Medications:  Current Outpatient Medications   Medication Instructions    brimonidine (AlphaGAN) 0.2 % ophthalmic solution 1 drop, 2 times daily    dorzolamide-timoloL (Cosopt) 22.3-6.8 mg/mL ophthalmic solution 1 drop, 2 times daily    furosemide (LASIX) 40 mg, oral, Daily    insulin asp prt-insulin aspart (NovoLOG Mix 70-30) 100 unit/mL (70-30) injection 15 Units, subcutaneous, 2 times daily    losartan (COZAAR) 50 mg, oral, Daily    rosuvastatin (CRESTOR) 5 mg, oral, Every evening    Xarelto 10  mg, oral, Daily       Physical Exam:  Subjective:   Examination:   General Examination:   General Appearance: Well developed, well nourished, in no acute distress.   Head: normocephalic, atraumatic   Eyes: Anicteric sclera. Pupils are equally round and reactive to light.  Extraocular movements are intact.    Ears: normal   Oral: Cavity: mucosa moist.   Throat: clear.   Neck/Thyroid: neck supple, full range of motion, no cervical lymphadenopathy.   Skin: warm and dry, no suspicious lesions.    Heart: regular rate and rhythm, S1, S2 normal, no murmurs.   Lungs: clear to auscultation bilaterally.   Abdomen: soft, non-tender, non-distended, bowl sound present, normal.   Extremities: no clubbing, no cyanosis, no edema.  Left hip acute angulated fracture prior right hip hemiarthroplasty  Neuro: non-focal, motor strength normal upper lower extremities, sensory exam intact.       Assessment/Plan   I48.91 Atrial fibrillation RVR  I25.1 CAD prior stents prior non-STEMI  I50.3 Diastolic heart failure  I79 previous stent right SFA popliteal artery angioplasty right PT peroneal artery  N18.for stage IV chronic kidney disease  E11.9 Type 2 diabetes  W19.X XA mechanical fall at home hip fracture    echocardiogram     continue rate control with Cardizem     losartan for blood pressure as needed     we will hold the amiodarone as she will be off anticoagulation we will resume anticoagulation and amiodarone postoperatively and consider the potential of cardioversion at later date      Code Status:  DNR and No Intubation    I spent 65 minutes in the professional and overall care of this patient.        Olman Cornejo MD

## 2024-12-30 NOTE — H&P
History Of Present Illness  Dena Short is a 89 y.o. female with past medical history significant for CAD s/p cardiac cath with previous stents, history of NSTEMI, chronic diastolic heart failure [LVEF 55 to 60% in May 2021], paroxysmal atrial fibrillation on Xarelto s/p cardioversion but rate control medications/antiarrhythmics discontinued by patient, history of cardiomyopathy, sinus arrhythmia, PAD s/p stenting right SFA-popliteal artery and angioplasty right PT and peroneal arteries, CKD 4, and IDDM 2 presents to [facility] on 12/29/2024 from home after fall.    Patient states she woke up this morning was feeling generally well.  She reportedly took her morning medications including insulin around 9am and was preparing breakfast, and states that her legs gave out because she attempted to turn around too fast. She believes she may have tripped over something in the kitchen as well. She fell onto her left side and landed on her back. She does not recall if she hit her head, but knows that she did not lose consciousness. She is on xarelto daily for her a.fib. After the fall, she was unable to get up and eventually EMS was called by the neighbors to bring the patient to the ER for evaluation.  She denies any additional injuries. Currently complaining of 6/10 throbbing left hip pain and requesting additional pain medications. She denies any recent fever/chills, headache, dizziness, chest pain / palpitations, shortness of breath, cough, abdominal pain, vomiting, diarrhea, constipation, dysuria, or hematuria.    ED Course:  Vital signs: Temp. 96.8F, HR 89 bpm, RR 18, /93, SPO2 97% on room air   CMP: Glucose 188, BUN 30, creat 1.35, EGFR 37, total bili 1.4  CBC w/diff: WBC 16.6, H&H 13.1/43.5, neutrophils 14.81  PT/INR: 20/1.8  UA pending collection  CXR: Mild vascular congestion without overt edema  CT head: Diffuse volume loss and periventricular white matter changes, most consistent with small vessel ischemic  disease.  No evidence of acute cortical infarct or intracranial hemorrhage.  CT cervical spine: No evidence of acute fracture.  Degenerative changes throughout cervical spine.  XR left hip: Impacted mildly angulated left sided subcapital femoral neck fracture  Medications Given: Fentanyl 25 mcg IV x 1, morphine 4 mg IV x 1  Disposition: Patient admitted for nondisplaced subcapital femoral neck fracture.  Orthopedic surgery consulted.    Past Medical History  Past Medical History:   Diagnosis Date    Basal cell carcinoma     Bradycardia     CAD (coronary artery disease)     Cardiomyopathy 04/09/2018    Choroidal detachment, hemorrhagic, right eye     Chronic diastolic heart failure     CKD (chronic kidney disease), stage IV (Multi)     Hip fracture, right 02/01/2024    Insulin dependent type 2 diabetes mellitus (Multi)     NSTEMI (non-ST elevated myocardial infarction) (Multi)     PAD (peripheral artery disease) (CMS-[facility])     Paroxysmal atrial fibrillation (Multi)     Sinus arrhythmia       Surgical History  She has a past surgical history that includes Other surgical history (01/27/2022); Cholecystectomy (09/14/2018); Other surgical history (09/14/2018); Coronary stent placement; Cardiac catheterization; Cardioversion; and Hip fracture surgery (Right).     Social History  She reports that she has never smoked. She has never used smokeless tobacco. She reports that she does not drink alcohol and does not use drugs.    Family History  Family History   Problem Relation Name Age of Onset    Coronary artery disease Father       Allergies  Patient has no known allergies.    Review of Systems   Constitutional:  Negative for chills, fatigue and fever.   HENT:  Negative for hearing loss, sore throat and trouble swallowing.    Eyes:  Positive for visual disturbance (Right eye, chronic per patient report).   Respiratory:  Negative for cough, chest tightness and shortness of breath.    Cardiovascular:  Negative for chest pain  and palpitations.   Gastrointestinal:  Positive for nausea. Negative for abdominal pain, constipation and diarrhea.   Genitourinary:  Negative for dysuria, flank pain, frequency, hematuria and urgency.   Musculoskeletal:  Negative for back pain and gait problem.        Left hip pain   Skin:  Negative for color change, rash and wound.   Neurological:  Negative for dizziness, syncope, weakness and light-headedness.   Psychiatric/Behavioral:  Negative for agitation, confusion and dysphoric mood. The patient is not nervous/anxious.        Physical Exam  Vitals reviewed.   Constitutional:       Appearance: She is not ill-appearing.   HENT:      Head: Normocephalic and atraumatic.      Mouth/Throat:      Mouth: Mucous membranes are moist.      Pharynx: Oropharynx is clear.   Eyes:      General:         Left eye: No discharge (PERRLA).      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      Pulses:           Radial pulses are 2+ on the right side and 2+ on the left side.        Dorsalis pedis pulses are 1+ on the right side and 1+ on the left side.      Heart sounds: Normal heart sounds. No murmur heard.  Pulmonary:      Effort: Pulmonary effort is normal. No respiratory distress.      Breath sounds: Normal breath sounds. No wheezing, rhonchi or rales.   Abdominal:      General: Bowel sounds are normal. There is no distension.      Palpations: Abdomen is soft.      Tenderness: There is no abdominal tenderness. There is no guarding.   Musculoskeletal:      Right hip: Normal.      Left hip: Tenderness present. Decreased range of motion.      Comments: Left lower extremity externally rotated   Skin:     Capillary Refill: Capillary refill takes less than 2 seconds.   Neurological:      General: No focal deficit present.      Mental Status: She is alert and oriented to person, place,  and time.      Comments: Equal bilateral hand grasps and foot dorsi/plantarflexion   Psychiatric:         Attention and Perception: Attention normal.         Mood and Affect: Mood normal.         Behavior: Behavior normal.         Thought Content: Thought content normal.       Last Recorded Vitals  Blood pressure 121/66, pulse 82, temperature 36.1 °C (97 °F), temperature source Temporal, resp. rate 16, height 1.524 m (5'), weight 73 kg (160 lb 15 oz), SpO2 97%.  Visit Vitals  /66 (BP Location: Right arm, Patient Position: Lying)   Pulse 82   Temp 36.1 °C (97 °F) (Temporal)   Resp 16   Ht 1.524 m (5')   Wt 73 kg (160 lb 15 oz)   SpO2 97%   BMI 31.43 kg/m²   Smoking Status Never   BSA 1.76 m²     Weight: 66.2 kg (146 lb)   Pain Assessment: 0-10  0-10 (Numeric) Pain Score: 8  Pain Type: Acute pain  Pain Location: Leg  Pain Orientation: Left  Pain Descriptors: Aching  Pain Frequency: Constant/continuous    Relevant Results  Results for orders placed or performed during the hospital encounter of 12/29/24 (from the past 24 hours)   CBC and Auto Differential   Result Value Ref Range    WBC 16.6 (H) 4.4 - 11.3 x10*3/uL    nRBC 0.0 0.0 - 0.0 /100 WBCs    RBC 5.03 4.00 - 5.20 x10*6/uL    Hemoglobin 13.1 12.0 - 16.0 g/dL    Hematocrit 43.5 36.0 - 46.0 %    MCV 87 80 - 100 fL    MCH 26.0 26.0 - 34.0 pg    MCHC 30.1 (L) 32.0 - 36.0 g/dL    RDW 14.9 (H) 11.5 - 14.5 %    Platelets 280 150 - 450 x10*3/uL    Neutrophils % 89.4 40.0 - 80.0 %    Immature Granulocytes %, Automated 0.5 0.0 - 0.9 %    Lymphocytes % 3.5 13.0 - 44.0 %    Monocytes % 6.3 2.0 - 10.0 %    Eosinophils % 0.1 0.0 - 6.0 %    Basophils % 0.2 0.0 - 2.0 %    Neutrophils Absolute 14.81 (H) 1.60 - 5.50 x10*3/uL    Immature Granulocytes Absolute, Automated 0.08 0.00 - 0.50 x10*3/uL    Lymphocytes Absolute 0.58 (L) 0.80 - 3.00 x10*3/uL    Monocytes Absolute 1.04 (H) 0.05 - 0.80 x10*3/uL    Eosinophils Absolute 0.01 0.00 - 0.40 x10*3/uL    Basophils Absolute 0.03  0.00 - 0.10 x10*3/uL   Comprehensive metabolic panel   Result Value Ref Range    Glucose 188 (H) 74 - 99 mg/dL    Sodium 140 136 - 145 mmol/L    Potassium 3.7 3.5 - 5.3 mmol/L    Chloride 100 98 - 107 mmol/L    Bicarbonate 26 21 - 32 mmol/L    Anion Gap 18 10 - 20 mmol/L    Urea Nitrogen 30 (H) 6 - 23 mg/dL    Creatinine 1.35 (H) 0.50 - 1.05 mg/dL    eGFR 37 (L) >60 mL/min/1.73m*2    Calcium 9.8 8.6 - 10.3 mg/dL    Albumin 4.3 3.4 - 5.0 g/dL    Alkaline Phosphatase 96 33 - 136 U/L    Total Protein 7.7 6.4 - 8.2 g/dL    AST 23 9 - 39 U/L    Bilirubin, Total 1.4 (H) 0.0 - 1.2 mg/dL    ALT 14 7 - 45 U/L   Protime-INR   Result Value Ref Range    Protime 20.0 (H) 9.8 - 12.8 seconds    INR 1.8 (H) 0.9 - 1.1   Troponin I, High Sensitivity   Result Value Ref Range    Troponin I, High Sensitivity 131 (HH) 0 - 13 ng/L   Type and screen   Result Value Ref Range    ABO TYPE O     Rh TYPE POS     ANTIBODY SCREEN NEG    POCT GLUCOSE   Result Value Ref Range    POCT Glucose 230 (H) 74 - 99 mg/dL   Troponin I, High Sensitivity   Result Value Ref Range    Troponin I, High Sensitivity 153 (HH) 0 - 13 ng/L   POCT GLUCOSE   Result Value Ref Range    POCT Glucose 262 (H) 74 - 99 mg/dL   POCT GLUCOSE   Result Value Ref Range    POCT Glucose 210 (H) 74 - 99 mg/dL      XR femur left 2+ views    Result Date: 12/29/2024  Interpreted By:  Alyssa Espitia, STUDY: Left femur, two views.   INDICATION: Signs/Symptoms:Need completion imaging of femur.   COMPARISON: Same-day left hip radiographs.   ACCESSION NUMBER(S): [id number]   ORDERING CLINICIAN: JINA OLIVER   FINDINGS: Redemonstration of nondisplaced subcapital left femoral neck fracture with mild impaction. No additional fractures of the femur. No malalignment of the hip or knee joints. Femoral artery vascular calcifications.       1. Mildly impacted nondisplaced subcapital femoral neck fracture, unchanged since prior.   MACRO:   None.   Signed by: Alyssa Espitia 12/29/2024 9:46 PM  Dictation workstation:   [id number]    CT cervical spine wo IV contrast    Result Date: 12/29/2024  Interpreted By:  Antwan Duarte, STUDY: CT CERVICAL SPINE WO IV CONTRAST; 12/29/2024 3:21 pm   INDICATION: Signs/Symptoms:fall on thinners.   COMPARISON: CT dated 05/05/2021   ACCESSION NUMBER(S): [id number]   ORDERING CLINICIAN: JOHN PANTOJA   TECHNIQUE: Contiguous axial CT images were obtained at  2 mm slice thickness through the cervical spine without contrast administration. The images were then reconstructed in the coronal and sagittal planes.   FINDINGS: There is no CT evidence of acute fracture identified. No prevertebral soft tissue swelling is identified.   ALIGNMENT: Minimal anterolisthesis of C2 on C3, of C3 on C4 and of C5 on C6.   VERTEBRAE/DISC SPACES: Mild discogenic degenerative changes are seen throughout the cervical spine, greatest at the C6-7 level. Moderate facet degenerative changes are seen throughout the cervical spine.   ADDITIONAL FINDINGS: Evaluation of the visualized soft tissues of the neck is limited by the lack of intravenous contrast.  Within this limitation, no gross mass or lymphadenopathy is identified. Dense atherosclerotic calcifications are seen in the carotid bulbs bilaterally. A metallic stent is seen in the carotid artery.       1.  No CT evidence of acute fracture. 2.  Degenerative changes throughout the cervical spine, as above.   Signed by: Antwan Duarte 12/29/2024 3:49 PM Dictation workstation:   [id number]    CT head wo IV contrast    Result Date: 12/29/2024  Interpreted By:  Antwan Duarte, STUDY: CT HEAD WO IV CONTRAST; 12/29/2024 3:21 pm   INDICATION: Signs/Symptoms:fall on thinners.   COMPARISON: CT dated 03/29/2022   ACCESSION NUMBER(S): [id number]   ORDERING CLINICIAN: JOHN PANTOJA   TECHNIQUE: Contiguous axial CT images were obtained through the head at 5 mm slice thickness without contrast administration.   FINDINGS: INTRACRANIAL: Mild diffuse volume loss  is seen bilaterally. Mild periventricular white matter changes are seen.  The grey-white differentiation is intact. There is no mass effect or midline shift. There is no extraaxial fluid collection. There is no intracranial hemorrhage. The calvarium  is unremarkable.   EXTRACRANIAL: Visualized paranasal sinuses and mastoids are clear.       1. Diffuse volume loss and periventricular white matter changes, most consistent with small vessel ischemic disease. 2. No evidence of acute cortical infarct or intracranial hemorrhage.     MACRO: None   Signed by: Antwan Duarte 12/29/2024 3:46 PM Dictation workstation:   [id number]    XR chest 1 view    Result Date: 12/29/2024  STUDY: Chest Radiograph;  12/29/2024 2:47PM INDICATION: Patient had a fall. COMPARISON: 5/6/2021 XR Chest. ACCESSION NUMBER(S): [id number] ORDERING CLINICIAN: JOHN PANTOJA TECHNIQUE:  Frontal chest was obtained at 1447 hours. FINDINGS: CARDIOMEDIASTINAL SILHOUETTE: Heart is mildly enlarged with slight increased pulmonary vascularity.  LUNGS: Minimal atelectasis in the right base.  ABDOMEN: No remarkable upper abdominal findings.  BONES: No acute osseous changes.    Mild vascular congestion without overt edema. Signed by Wil Tapia MD    XR hip left with pelvis when performed 2 or 3 views    Result Date: 12/29/2024  STUDY: Pelvis and Left Hip Radiographs; 12/29/2024 1:47PM INDICATION: Left hip injury. COMPARISON: None Available. ACCESSION NUMBER(S): [id number] ORDERING CLINICIAN: JOHN PANTOJA TECHNIQUE:  AP view of the pelvis and two view(s) (three images) of the left hip. FINDINGS:  PELVIS: The pelvic ring is intact.  There is no acute fracture.  Right total hip arthroplasty noted. LEFT HIP: Impacted mildly angulated left sided subcapital femoral neck fracture.    Impacted mildly angulated left sided subcapital femoral neck fracture. Signed by Wil Tapia MD        Home Medications  Prior to Admission medications    Medication Sig Start  Date End Date Taking? Authorizing Provider   brimonidine (AlphaGAN) 0.2 % ophthalmic solution Administer 1 drop into the left eye 2 times a day.   Yes Historical Provider, MD   dorzolamide-timoloL (Cosopt) 22.3-6.8 mg/mL ophthalmic solution Administer 1 drop into the left eye 2 times a day.   Yes Historical Provider, MD   furosemide (Lasix) 40 mg tablet Take 1 tablet (40 mg) by mouth once daily. 10/1/24  Yes Tracy M Schwab, APRN-CNP   insulin asp prt-insulin aspart (NovoLOG Mix 70-30) 100 unit/mL (70-30) injection Inject 15 Units under the skin 2 times a day.   Yes Historical Provider, MD   losartan (Cozaar) 50 mg tablet Take 1 tablet by mouth once daily  Patient not taking: Reported on 12/29/2024 7/8/24   Tracy M Schwab, APRN-CNP   Xarelto 10 mg tablet Take 1 tablet by mouth once daily  Patient taking differently: Take 1 tablet (10 mg) by mouth once daily in the evening. 12/26/24  Yes Tracy M Schwab, APRN-CNP   rosuvastatin (Crestor) 5 mg tablet Take 1 tablet (5 mg) by mouth once daily in the evening.    Historical Provider, MD   amiodarone (Pacerone) 200 mg tablet Take 1 tablet (200 mg) by mouth once daily. 2/27/24 12/29/24  Historical Provider, MD   Cartia  mg 24 hr capsule Take 1 capsule (120 mg) by mouth once daily. 5/22/24 12/29/24  Historical Provider, MD   docusate sodium (Colace) 100 mg capsule TAKE 1 CAPSULE BY MOUTH ONCE DAILY FOR 30 DAYS 6/1/24 12/29/24  Historical Provider, MD   iron polysaccharides (Nu-Iron,Niferex) 150 mg iron capsule Take 1 capsule (150 mg) by mouth once daily.  12/29/24  Historical Provider, MD   lactobacillus acidophilus capsule Take 1 capsule by mouth once daily.  12/29/24  Historical Provider, MD   latanoprost (Xalatan) 0.005 % ophthalmic solution Administer into affected eye(s). 2/8/16 12/29/24  Historical Provider, MD   Xarelto 10 mg tablet Take 1 tablet (10 mg) by mouth once daily. 1/4/24 12/26/24  Tracy M Schwab, APRN-CNP       Medications  Scheduled  medications  brimonidine, 1 drop, Left Eye, BID  dorzolamide-timoloL, 1 drop, Left Eye, BID  furosemide, 40 mg, oral, Daily  insulin lispro, 0-10 Units, subcutaneous, q6h  [Held by provider] insulin lispro protamin-lispro, 15 Units, subcutaneous, BID AC  lidocaine, 2 patch, transdermal, Nightly  [Held by provider] rivaroxaban, 10 mg, oral, Daily  rosuvastatin, 5 mg, oral, q PM      Continuous medications  lactated Ringer's, 50 mL/hr, Last Rate: 50 mL/hr (12/29/24 2902)      PRN medications  PRN medications: acetaminophen **OR** acetaminophen **OR** acetaminophen, dextrose, dextrose, glucagon, glucagon, melatonin, morphine, morphine, oxygen, polyethylene glycol        Assessment/Plan   Assessment & Plan  Closed fracture of left hip, initial encounter    Fall    Elevated troponin level    CKD (chronic kidney disease), stage IV (Multi)    Neutrophilic leukocytosis        Plan:  Code Status: DNR and No Intubation   Consult: Orthopedic surgery.  Cardiology.    ATB: Preop antibiotics to be ordered by orthopedics    IVFs: LR at 50 mL/h continuous while n.p.o.    Meds: On humalog mix 75-30 15 units BID at home, changed to SSI while patient is NPO.  Hypoglycemia protocol.    Xarelto held. Otherwise, home medications resumed as appropriate  Tylenol 650 mg p.o. every 4 hours as needed for pain 1-6.  Morphine 1 mg every 4 hours as needed moderate pain, morphine 2 mg every 4 hours as needed severe pain.  Melatonin 3 milligrams p.o. daily as needed for insomnia, MiraLAX 17 gms p.o daily as needed for constipation.  Avoid nephrotoxic medications  Monitor for hypo/hyperglycemia signs and symptoms     Diet: Cardiac, carb consistent.  N.p.o. after midnight  Telemetry monitoring  Vital signs with BP, HR, & RR Q 4 hours.  Pulse ox Q 4 hours.   Admission height and weight.    Labs ordered: CBC, BMP, Mg, Phos. UA w/ culture. Type and screen. Troponin.  -Neutrophilic leukocytosis likely reactive to fall, monitor with daily  CBC  -Creatinine 1.35, stable/slightly improved.  Baseline over the past 2 years has been between 1.42-1.88  Monitor / trend labs while inpatient.  Monitoring and replace electrolytes as needed.  Transfuse with PRBC for hemoglobin<7.0     Test ordered: Deferred to attending and consults    Additional Orders:   Aspiration precautions.  Fall precautions   Bedrest  Every 4 hours neurovascular checks  PT/OT eval and treat to be ordered postop  Call physician for temperature < 36.5 or >38.0 degrees celsius.  Call physician for pulse <50 or >120.  Call physician for respiratory rate <10 or >22.  Call physician for systolic blood pressure <90 or >170.  Call physician for diastolic blood pressure < 50 or >100.  Call physician for pulse ox <92%.    VTE prophylaxis:   Home dose of Xarelto currently on hold in anticipation of surgical intervention tomorrow for hip fracture  BLE SCDs.  Monitor for s/s of bleeding    See additional orders for further plan of care. Any further evaluation and management per attending and consulting physicians.      This note has been transcribed using Dragon voice recognition system and there is a possibility of unintentional typing misprints.  Any information found to be copied from previous providers is done in the best interest of the patient to provide accurate, quality, and continuity of care.     I spent 60 minutes in the professional and overall care of this patient.      MONA Broderick-[facility]  Med. [place]

## 2024-12-30 NOTE — H&P
Internal Medicine History and Physical    PATIENT NAME:  Dena Short    MRN: [id number]  DATE of SERVICE: December 30, 2024    Primary Care Physician: Slade Torres MD          Chief Complaint:  Fall    HPI:  This is a 89 y.o. female who presents with fall, patient states she was at her home that to get to the kitchen sink and she fell, she does not remember details, and she fell on the left side, and started having severe pain in the left hip area, patient has history of A-fib, coronary artery disease.    On today's evaluation, patient was laying down in bed, in no distress, she complains of pain in the left hip, he denies headache, neck, chest pain or shortness of breath.    Past Medical History:  Past Medical History:   Diagnosis Date    Basal cell carcinoma     Bradycardia     CAD (coronary artery disease)     Cardiomyopathy 04/09/2018    Choroidal detachment, hemorrhagic, right eye     Chronic diastolic heart failure     CKD (chronic kidney disease), stage IV (Multi)     Hip fracture, right 02/01/2024    Insulin dependent type 2 diabetes mellitus (Multi)     NSTEMI (non-ST elevated myocardial infarction) (Multi)     PAD (peripheral artery disease) (CMS-[facility])     Paroxysmal atrial fibrillation (Multi)     Sinus arrhythmia        Past Surgical History:  Past Surgical History:   Procedure Laterality Date    CARDIAC CATHETERIZATION      CARDIOVERSION      CHOLECYSTECTOMY  09/14/2018    Cholecystectomy    CORONARY STENT PLACEMENT      HIP FRACTURE SURGERY Right     Right hip hemiarthroplasty in 2021    OTHER SURGICAL HISTORY  01/27/2022    Arterial angioplasty of lower extremity    OTHER SURGICAL HISTORY  09/14/2018    Previous Stent Placement       Family History:  Family History   Problem Relation Name Age of Onset    Coronary artery disease Father         Social History:    Social History     Socioeconomic History    Marital status:      Spouse name: Not on file    Number of children: Not on file     Years of education: Not on file    Highest education level: Not on file   Occupational History    Not on file   Tobacco Use    Smoking status: Never    Smokeless tobacco: Never   Vaping Use    Vaping status: Never Used   Substance and Sexual Activity    Alcohol use: Never    Drug use: Never    Sexual activity: Defer   Other Topics Concern    Not on file   Social History Narrative    Not on file     Social Drivers of Health     Financial Resource Strain: Low Risk  (12/29/2024)    Overall Financial Resource Strain (CARDIA)     Difficulty of Paying Living Expenses: Not hard at all   Food Insecurity: No Food Insecurity (12/29/2024)    Hunger Vital Sign     Worried About Running Out of Food in the Last Year: Never true     Ran Out of Food in the Last Year: Never true   Transportation Needs: No Transportation Needs (12/29/2024)    PRAPARE - Transportation     Lack of Transportation (Medical): No     Lack of Transportation (Non-Medical): No   Physical Activity: Not on file   Stress: Not on file   Social Connections: Not on file   Intimate Partner Violence: Not At Risk (12/29/2024)    Humiliation, Afraid, Rape, and Kick questionnaire     Fear of Current or Ex-Partner: No     Emotionally Abused: No     Physically Abused: No     Sexually Abused: No   Housing Stability: Low Risk  (12/29/2024)    Housing Stability Vital Sign     Unable to Pay for Housing in the Last Year: No     Number of Times Moved in the Last Year: 0     Homeless in the Last Year: No         Prior to Admission Medications:  Medications Prior to Admission   Medication Sig Dispense Refill Last Dose/Taking    brimonidine (AlphaGAN) 0.2 % ophthalmic solution Administer 1 drop into the left eye 2 times a day.   12/29/2024 Morning    dorzolamide-timoloL (Cosopt) 22.3-6.8 mg/mL ophthalmic solution Administer 1 drop into the left eye 2 times a day.   12/29/2024 Morning    furosemide (Lasix) 40 mg tablet Take 1 tablet (40 mg) by mouth once daily. 90 tablet 3  12/29/2024 Morning    insulin asp prt-insulin aspart (NovoLOG Mix 70-30) 100 unit/mL (70-30) injection Inject 15 Units under the skin 2 times a day.   12/29/2024 Morning    losartan (Cozaar) 50 mg tablet Take 1 tablet by mouth once daily (Patient not taking: Reported on 12/29/2024) 90 tablet 3 Not Taking    Xarelto 10 mg tablet Take 1 tablet by mouth once daily (Patient taking differently: Take 1 tablet (10 mg) by mouth once daily in the evening.) 90 tablet 3 12/28/2024 Evening    rosuvastatin (Crestor) 5 mg tablet Take 1 tablet (5 mg) by mouth once daily in the evening.   Unknown       Active orders:  Active Orders   Lab    Basic metabolic panel     Frequency: AM draw     Number of Occurrences: Until Specified    CBC     Frequency: AM draw     Number of Occurrences: Until Specified    Magnesium     Frequency: AM draw     Number of Occurrences: Until Specified    Urinalysis with Reflex Microscopic     Frequency: STAT     Number of Occurrences: 1 Occurrences   Diet    NPO Diet; Effective midnight     Frequency: Effective midnight     Number of Occurrences: Until Specified   Nursing    Activity (specify) Strict Bed Rest; Other; until cleared by ortho     Frequency: Until discontinued     Number of Occurrences: Until Specified    Add Corrective scale insulin dose to scheduled pre-meal insulin, if ordered.     Frequency: Until discontinued     Number of Occurrences: Until Specified    Continuous Pulse Oximetry     Frequency: Until discontinued     Number of Occurrences: Until Specified    Do NOT give corective scale insulin at bedtime without calling Provider.     Frequency: Until discontinued     Number of Occurrences: Until Specified    Glucose 10-70 mg/dL & CONSCIOUS- Give 15 Grams of Carbohydrates and repeat until blood glucose level reaches 100 mg/dL or greater.     Frequency: Until discontinued     Number of Occurrences: Until Specified     Order Comments: Select 1 of the following:  -1 cup skim milk  -3 or 4  glucose tablets  -4 ounces of fruit juice or regular soda.  Patient MUST be CONSCIOUS and able to eat or drink      Height on admission     Frequency: Once     Number of Occurrences: 1 Occurrences    Neurovascular checks     Frequency: q4h     Number of Occurrences: Until Specified    Notify provider     Frequency: Until discontinued     Number of Occurrences: Until Specified    Notify provider (specify parameters)     Frequency: Until discontinued     Number of Occurrences: Until Specified     Order Comments: [place] Hypoglycemia interventions.      Notify provider (specify parameters)     Frequency: Until discontinued     Number of Occurrences: Until Specified     Order Comments: For blood glucose greater than 200 mg/dL twice over 24 hours.  Provider to consider Endocrine Consult.      Notify provider (specify parameters)     Frequency: Until discontinued     Number of Occurrences: Until Specified    Pain Assessment     Frequency: PRN     Number of Occurrences: Until Specified    Vital Signs     Frequency: q4h     Number of Occurrences: Until Specified    Weight on admission     Frequency: Once     Number of Occurrences: 1 Occurrences   Code Status    DNR and No Intubation     Frequency: Continuous     Number of Occurrences: Until Specified   Consult    Inpatient consult to Cardiology     Frequency: Once     Number of Occurrences: 1 Occurrences    Inpatient consult to Orthopaedic Surgery     Frequency: Once     Number of Occurrences: 1 Occurrences    Inpatient consult to Social Work and TCC     Frequency: Once     Number of Occurrences: 1 Occurrences   Nourishments    May Participate in Room Service     Frequency: Once     Number of Occurrences: 1 Occurrences   ECG    ECG 12 lead     Frequency: Once     Number of Occurrences: 1 Occurrences    Electrocardiogram, 12-lead PRN ACS symptoms     Frequency: PRN     Number of Occurrences: Until Specified     Order Comments: Notify provider if performed.     Precaution     Aspiration precautions     Frequency: Until discontinued     Number of Occurrences: Until Specified    Fall precautions     Frequency: Until discontinued     Number of Occurrences: Until Specified   Point of Care Testing - Docked Device    POCT Glucose     Frequency: 4x daily - AC and at bedtime     Number of Occurrences: 3 Days     Order Comments: And PRN        POCT Glucose     Frequency: PRN     Number of Occurrences: Until Specified     Order Comments: PRN for hypoglycemia interventions instituted.  Retest blood glucose level every 15 minutes after every hypoglycemic intervention until blood glucose is greater than 100 mg/dL.        POCT Glucose     Frequency: q6h     Number of Occurrences: Until Specified     Order Comments: While NPO And PRN       Telemetry    Telemetry monitoring - Floor only     Frequency: Until discontinued     Number of Occurrences: 3 Days   Medications    acetaminophen (Tylenol) oral liquid 650 mg     Linked Order: Or     Frequency: q4h PRN     Dose: 650 mg     Route: oral    acetaminophen (Tylenol) suppository 650 mg     Linked Order: Or     Frequency: q4h PRN     Dose: 650 mg     Route: rectal    acetaminophen (Tylenol) tablet 650 mg     Linked Order: Or     Frequency: q4h PRN     Dose: 650 mg     Route: oral    brimonidine (AlphaGAN) 0.2 % ophthalmic solution 1 drop     Frequency: BID     Dose: 1 drop     Route: Left Eye    dextrose 50 % injection 12.5 g     Frequency: q15 min PRN     Dose: 12.5 g     Route: intravenous    dextrose 50 % injection 25 g     Frequency: q15 min PRN     Dose: 25 g     Route: intravenous    dorzolamide-timoloL (Cosopt) 22.3-6.8 mg/mL ophthalmic solution 1 drop     Frequency: BID     Dose: 1 drop     Route: Left Eye    furosemide (Lasix) tablet 40 mg     Frequency: Daily     Dose: 40 mg     Route: oral    glucagon (Glucagen) injection 1 mg     Frequency: q15 min PRN     Dose: 1 mg     Route: intramuscular    glucagon (Glucagen) injection 1 mg      Frequency: q15 min PRN     Dose: 1 mg     Route: intramuscular    insulin lispro injection 0-10 Units     Frequency: q6h     Dose: 0-10 Units     Route: subcutaneous    insulin lispro protamin-lispro (HumaLOG Mix 75-25) 100 unit/mL (75-25) injection 15 Units     Frequency: BID AC     Dose: 15 Units     Route: subcutaneous    lactated Ringer's infusion     Frequency: Continuous     Dose: 50 mL/hr     Route: intravenous    lidocaine 4 % patch 2 patch     Frequency: Nightly     Dose: 2 patch     Route: transdermal    melatonin tablet 3 mg     Frequency: Nightly PRN     Dose: 3 mg     Route: oral    morphine injection 1 mg     Frequency: q4h PRN     Dose: 1 mg     Route: intravenous    morphine injection 2 mg     Frequency: q4h PRN     Dose: 2 mg     Route: intravenous    oxygen (O2) therapy     Frequency: Continuous PRN - O2/gases     Route: inhalation    polyethylene glycol (Glycolax, Miralax) packet 17 g     Frequency: Daily PRN     Dose: 17 g     Route: oral    rivaroxaban (Xarelto) tablet 10 mg     Frequency: Daily     Dose: 10 mg     Route: oral     Order Comments: Home med    rosuvastatin (Crestor) tablet 5 mg     Frequency: q PM     Dose: 5 mg     Route: oral           Allergies:   No Known Allergies    Review of Systems:  A 10 systems review of systems is negative except for HPI.      Vitals:  Patient Vitals for the past 24 hrs:   BP Temp Temp src Pulse Resp SpO2 Height Weight   12/30/24 0800 -- 36.2 °C (97.2 °F) Temporal 81 18 95 % -- --   12/30/24 0400 138/65 35.9 °C (96.6 °F) Temporal 88 16 96 % -- --   12/30/24 0000 121/66 36.1 °C (97 °F) Temporal 82 16 97 % -- --   12/29/24 2100 104/83 36.3 °C (97.3 °F) Temporal 106 18 98 % 1.524 m (5') 73 kg (160 lb 15 oz)   12/29/24 2042 141/76 36.7 °C (98.1 °F) Temporal (!) 114 22 96 % -- --   12/29/24 1917 136/66 -- Oral (!) 114 17 94 % -- --   12/29/24 1823 136/72 36.8 °C (98.2 °F) -- (!) 142 16 95 % -- --   12/29/24 1534 132/77 -- -- 90 16 98 % -- --   12/29/24 1405  -- -- -- -- -- 97 % -- --   12/29/24 1341 (!) 158/93 36 °C (96.8 °F) Temporal 89 18 97 % 1.524 m (5') 66.2 kg (146 lb)     Body mass index is 31.43 kg/m².         Physical Exam:  General appearance: Well-appearing alert, in no acute distress   Skin: Skin color, texture, turgor normal  Head: normal  Eyes: Anicteric sclera. Extraocular movements are intact.   Ears: external ears normal  Nose/Sinuses: Negative  Oropharynx: Lips, mucosa, and tongue normal  Neck: Supple, no adenopathy  Lungs: Clear to auscultation, no wheezing or rhonchi  Heart: RRR without murmur, gallop, or rubs.   Abdomen: Soft, non-tender. Bowel sounds normal  Extremities: no edema  Neuro: Alert oriented x3, no focal deficit.    Labs:  Results for orders placed or performed during the hospital encounter of 12/29/24 (from the past 24 hours)   CBC and Auto Differential   Result Value Ref Range    WBC 16.6 (H) 4.4 - 11.3 x10*3/uL    nRBC 0.0 0.0 - 0.0 /100 WBCs    RBC 5.03 4.00 - 5.20 x10*6/uL    Hemoglobin 13.1 12.0 - 16.0 g/dL    Hematocrit 43.5 36.0 - 46.0 %    MCV 87 80 - 100 fL    MCH 26.0 26.0 - 34.0 pg    MCHC 30.1 (L) 32.0 - 36.0 g/dL    RDW 14.9 (H) 11.5 - 14.5 %    Platelets 280 150 - 450 x10*3/uL    Neutrophils % 89.4 40.0 - 80.0 %    Immature Granulocytes %, Automated 0.5 0.0 - 0.9 %    Lymphocytes % 3.5 13.0 - 44.0 %    Monocytes % 6.3 2.0 - 10.0 %    Eosinophils % 0.1 0.0 - 6.0 %    Basophils % 0.2 0.0 - 2.0 %    Neutrophils Absolute 14.81 (H) 1.60 - 5.50 x10*3/uL    Immature Granulocytes Absolute, Automated 0.08 0.00 - 0.50 x10*3/uL    Lymphocytes Absolute 0.58 (L) 0.80 - 3.00 x10*3/uL    Monocytes Absolute 1.04 (H) 0.05 - 0.80 x10*3/uL    Eosinophils Absolute 0.01 0.00 - 0.40 x10*3/uL    Basophils Absolute 0.03 0.00 - 0.10 x10*3/uL   Comprehensive metabolic panel   Result Value Ref Range    Glucose 188 (H) 74 - 99 mg/dL    Sodium 140 136 - 145 mmol/L    Potassium 3.7 3.5 - 5.3 mmol/L    Chloride 100 98 - 107 mmol/L    Bicarbonate 26 21 -  32 mmol/L    Anion Gap 18 10 - 20 mmol/L    Urea Nitrogen 30 (H) 6 - 23 mg/dL    Creatinine 1.35 (H) 0.50 - 1.05 mg/dL    eGFR 37 (L) >60 mL/min/1.73m*2    Calcium 9.8 8.6 - 10.3 mg/dL    Albumin 4.3 3.4 - 5.0 g/dL    Alkaline Phosphatase 96 33 - 136 U/L    Total Protein 7.7 6.4 - 8.2 g/dL    AST 23 9 - 39 U/L    Bilirubin, Total 1.4 (H) 0.0 - 1.2 mg/dL    ALT 14 7 - 45 U/L   Protime-INR   Result Value Ref Range    Protime 20.0 (H) 9.8 - 12.8 seconds    INR 1.8 (H) 0.9 - 1.1   Troponin I, High Sensitivity   Result Value Ref Range    Troponin I, High Sensitivity 131 (HH) 0 - 13 ng/L   Type and screen   Result Value Ref Range    ABO TYPE O     Rh TYPE POS     ANTIBODY SCREEN NEG    POCT GLUCOSE   Result Value Ref Range    POCT Glucose 230 (H) 74 - 99 mg/dL   Troponin I, High Sensitivity   Result Value Ref Range    Troponin I, High Sensitivity 153 (HH) 0 - 13 ng/L   POCT GLUCOSE   Result Value Ref Range    POCT Glucose 262 (H) 74 - 99 mg/dL   POCT GLUCOSE   Result Value Ref Range    POCT Glucose 210 (H) 74 - 99 mg/dL   POCT GLUCOSE   Result Value Ref Range    POCT Glucose 194 (H) 74 - 99 mg/dL   CBC   Result Value Ref Range    WBC 12.6 (H) 4.4 - 11.3 x10*3/uL    nRBC 0.0 0.0 - 0.0 /100 WBCs    RBC 4.45 4.00 - 5.20 x10*6/uL    Hemoglobin 11.3 (L) 12.0 - 16.0 g/dL    Hematocrit 38.4 36.0 - 46.0 %    MCV 86 80 - 100 fL    MCH 25.4 (L) 26.0 - 34.0 pg    MCHC 29.4 (L) 32.0 - 36.0 g/dL    RDW 15.3 (H) 11.5 - 14.5 %    Platelets 258 150 - 450 x10*3/uL   Basic metabolic panel   Result Value Ref Range    Glucose 206 (H) 74 - 99 mg/dL    Sodium 141 136 - 145 mmol/L    Potassium 4.7 3.5 - 5.3 mmol/L    Chloride 102 98 - 107 mmol/L    Bicarbonate 25 21 - 32 mmol/L    Anion Gap 19 10 - 20 mmol/L    Urea Nitrogen 33 (H) 6 - 23 mg/dL    Creatinine 1.36 (H) 0.50 - 1.05 mg/dL    eGFR 36 (L) >60 mL/min/1.73m*2    Calcium 9.3 8.6 - 10.3 mg/dL   Magnesium   Result Value Ref Range    Magnesium 2.32 1.60 - 2.40 mg/dL   Phosphorus   Result  "Value Ref Range    Phosphorus 3.8 2.5 - 4.9 mg/dL   Troponin I, High Sensitivity   Result Value Ref Range    Troponin I, High Sensitivity 148 (HH) 0 - 13 ng/L         Imaging:   Reviewed          Assessment/Plan:  Assessment & Plan  Closed fracture of left hip, initial encounter  Admit patient to the medical floor  Consult orthopedic surgery, recommendation for surgical repair  Consult cardiology  Patient is optimized on his medical management for his surgical intervention    Fall  PT/OT  Elevated troponin level  Continue cardiac medications as recommended by cardiology  Cardiology consult  CKD (chronic kidney disease), stage IV (Multi)  Monitor kidney function  Neutrophilic leukocytosis  We will monitor      DVT Prophylaxis- Addressed    Discussed with patient, RN    SIGNATURE: Dickson Barclay MD  DATE: December 30, 2024  TIME: 8:43 AM      This note was partially created using voice recognition software and is inherently subject to errors including those of syntax and \"sound-alike\" substitutions which may escape proofreading. In such instances, original meaning may be extrapolated by contextual derivation    "

## 2024-12-30 NOTE — ANESTHESIA PROCEDURE NOTES
Airway  Date/Time: 12/30/2024 2:20 PM  Urgency: elective      Staffing  Performed: CRNA   Authorized by: Gilberto Moran DO    Performed by: MONA Gardner-GISSELLE  Patient location during procedure: OR    Indications and Patient Condition  Indications for airway management: anesthesia and airway protection  Spontaneous ventilation: present  Sedation level: deep  Preoxygenated: yes  Patient position: sniffing  MILS maintained throughout  Mask difficulty assessment: 1 - vent by mask    Final Airway Details  Final airway type: endotracheal airway      Successful airway: ETT  Cuffed: yes   Successful intubation technique: video laryngoscopy  Facilitating devices/methods: intubating stylet  Endotracheal tube insertion site: oral  Blade size: #4  ETT size (mm): 7.0  Cormack-Lehane Classification: grade I - full view of glottis  Placement verified by: chest auscultation and capnometry   Measured from: lips  Number of attempts at approach: 1  Ventilation between attempts: none  Number of other approaches attempted: 0

## 2024-12-30 NOTE — PROGRESS NOTES
12/30/24 1239   Discharge Planning   Living Arrangements Alone   Support Systems Family members   Type of Residence Private residence   Number of Stairs to Enter Residence 0   Number of Stairs Within Residence 0   Home or Post Acute Services Post acute facilities (Rehab/SNF/etc)   Type of Post Acute Facility Services Skilled nursing   Expected Discharge Disposition SNF   Does the patient need discharge transport arranged? Yes   RoundTrip coordination needed? Yes   Financial Resource Strain   How hard is it for you to pay for the very basics like food, housing, medical care, and heating? Not very   Patient Choice   Patient / Family choosing to utilize agency / facility established prior to hospitalization Yes  ([facility])   Stroke Family Assessment   Stroke Family Assessment Needed No   Intensity of Service   Intensity of Service 0-30 min     Met with pt to review her dc plan. Anticipate SNF as pt has a L hip fx with repair scheduled for today at appx 4pm. The pt lives alone in a third floor apartment, building has an elevator. Her friends and/or family obtain groceries for her and provide transport to appointments. Uses a walker when she leaves her apartment and during the night. The pt sees the NP at Dr. Torres' office and prescriptions are obtained at [facility] with no barriers noted. She denies any difficulty with her living expenses such as utilities/groceries/prescriptions. The pt states she has been to [facility] in the past and would like a referral to that SNF.     1240 DSC tasked to send SNF referral. Pt will need a precert if accepted. Will need pt/ot evals after surgery. Pt would like to add [facility] to her referral, dsc alerted.

## 2024-12-30 NOTE — OP NOTE
Hip Hemiarthroplasty         4 PM REQUEST (L) Operative Note     Date: 2024 - 2024  OR Location: [facility] OR    Name: Dena Short : 1931, Age: 89 y.o., MRN: [id number], Sex: female    Diagnosis  Pre-op Diagnosis      * Closed fracture of left hip, initial encounter [S72.002A] Post-op Diagnosis     * Closed fracture of left hip, initial encounter [S72.002A]     Procedures  Hip Hemiarthroplasty         4 PM REQUEST  03395 - HI HEMIARTHROPLASTY HIP PARTIAL      Surgeons      * Dane Vega - Primary    Resident/Fellow/Other Assistant:  Surgeons and Role:  * No surgeons found with a matching role *    Staff:   Circulator: Libia Quinteroub Person: Karina  Scrub Person: Latia Quinteroub Person: Tomas Quinteroub Person: Tanya    Anesthesia Staff: Anesthesiologist: Gilberto Moran DO  CRNA: MONA Gardner-GISSELLE    Procedure Summary  Anesthesia: Anesthesia type not filed in the log.  ASA: ASA status not filed in the log.  Estimated Blood Loss: 50 mL  Intra-op Medications:   Administrations occurring from 1420 to 1630 on 24:   Medication Name Total Dose   ceFAZolin (Ancef) IV 2 g in 100 mL dextrose (iso) - premix 2 g   insulin lispro protamin-lispro (HumaLOG Mix 75-25) 100 unit/mL (75-25) injection 15 Units Cannot be calculated   phenylephrine (Tariq-Synephrine) injection 600 mcg              Anesthesia Record               Intraprocedure I/O Totals          Intake    Tranexamic Acid 0.00 mL    The total shown is the total volume documented since Anesthesia Start was filed.    Total Intake 0 mL          Specimen: No specimens collected              Drains and/or Catheters:   External Urinary Catheter (Active)       Tourniquet Times:         Implants:  Implants       Type Name Action Serial No.      Joint Hip STEM, HIP, REBEL, INSIGNIA, 5 STANDARD - [id number] Implanted      Joint HEAD, ENDO 45MM - [id number] Implanted      Joint SLEEVE, V40 +0MM STD TAPER UNITRAX - [id number] Implanted                Findings: Left hip displaced femoral neck fracture.  Minimal blood loss despite patient not preoperative Xarelto, secondary to very aggressive aqua mantis use    Indications: Dena Short is an 89 y.o. female who is having surgery for Closed fracture of left hip, initial encounter [S72.002A].  Fall from standing injury to left hip with radiographs demonstrating a displaced left femoral neck fracture    The patient was seen in the preoperative area. The risks, benefits, complications, treatment options, non-operative alternatives, expected recovery and outcomes were discussed with the patient. The possibilities of reaction to medication, pulmonary aspiration, injury to surrounding structures, bleeding, recurrent infection, the need for additional procedures, failure to diagnose a condition, and creating a complication requiring transfusion or operation were discussed with the patient. The patient concurred with the proposed plan, giving informed consent.  The site of surgery was properly noted/marked if necessary per policy. The patient has been actively warmed in preoperative area. Preoperative antibiotics these were given 20 minutes preop venous thrombosis prophylaxis patient and mechanical contralateral    Procedure Details: The patient was brought to the operating suite and identified and induced into successful general LMA anesthesia. The patient was then placed in the lateral decubitus position with the left hip being superior. The axilla as well as the peroneal region was well padded and protected during the entire course of the operation.  The left lower extremity was prepped and draped in the usual sterile fashion, and once a timeout was performed, a curvilinear incision was made over the lateral aspect of the patient's hip. Dissection was carried down through the subcutaneous fat the deep fascia was divided and along the length of the incision, this fascia was then carefully retracted anteriorly and  posteriorly, this sciatic nerve was identified and protected during the entire course of the operation. Next the short external rotators and the posterior capsule resected for the patient's femoral neck, the fracture hematoma was then evacuated the fragments of comminution were carefully removed and the distal fragment was trimmed using an oscillating saw. The femoral head was then carefully removed using a corkscrew and measured to 45 mm. The acetabulum was then carefully protected examined and found to be free of any arthritic changes. All loose fragments and debris were removed from the patient's acetabulum as well.  The left proximal femur was then presented from the wound using a well-padded hip skid, soft tissue was removed from the piriformis fossa, an entry canal finder was then used proximally enlarge the metaphyseal area and the proximal femur was then carefully broached using [place] insignia broaches to a size 5. The broach with a normal offset degree neck angle with a +0 femoral head was reduced and passed through full range of motion and found to be stable in all ranges.  All trial components were removed the permanent [place] insignia size 5 was then impacted into the proximal femur, and the permanent Vitallium femoral head size [] was then impacted on the trunnion of the taper. The hip was passed through full range of motion for the final time the wound was thoroughly irrigated using normal saline solution the capsule deep fascia subcutaneous tissue and skin were closed in layers.  Glue and mesh was used to seal the skin.  The first assistant, physician assistant, was present for the entire operation and was a key portion of the surgical team, being responsible for aiding in positioning exposure vital organ protection and closure.  Sterile dressing applied the patient was then aroused from anesthesia and extubated and transferred to the postanesthesia care unit in stable condition recover from  anesthesia.  Complications:  None; patient tolerated the procedure well.    Disposition: PACU - hemodynamically stable.  Condition: stable                 Additional Details:     Attending Attestation:     Dane Vega  Phone Number: [phone]

## 2024-12-30 NOTE — ANESTHESIA POSTPROCEDURE EVALUATION
Patient: Dena Short    Procedure Summary       Date: 12/30/24 Room / Location: [facility] OR 02 / Virtual [facility] OR    Anesthesia Start: 1413 Anesthesia Stop: 1556    Procedure: Hip Hemiarthroplasty         4 PM REQUEST (Left: Hip) Diagnosis:       Closed fracture of left hip, initial encounter      (Closed fracture of left hip, initial encounter [S72.002A])    Surgeons: Dane Vega MD Responsible Provider: Gilberto Moran DO    Anesthesia Type: general ASA Status: 3            Anesthesia Type: general    Vitals Value Taken Time   /84 12/30/24 1630   Temp 36.6 °C (97.9 °F) 12/30/24 1555   Pulse 76 12/30/24 1632   Resp 18 12/30/24 1632   SpO2 100 % 12/30/24 1632   Vitals shown include unfiled device data.    Anesthesia Post Evaluation    Patient location during evaluation: PACU  Patient participation: complete - patient participated  Level of consciousness: awake  Pain score: 0  Pain management: adequate  Multimodal analgesia pain management approach  Airway patency: patent  Two or more strategies used to mitigate risk of obstructive sleep apnea  Cardiovascular status: acceptable  Respiratory status: acceptable  Hydration status: acceptable  Postoperative Nausea and Vomiting: none        No notable events documented.

## 2024-12-31 ENCOUNTER — APPOINTMENT (OUTPATIENT)
Dept: CARDIOLOGY | Facility: HOSPITAL | Age: 89
DRG: 522 | End: 2024-12-31
Payer: MEDICARE

## 2024-12-31 LAB
ANION GAP SERPL CALC-SCNC: 17 MMOL/L (ref 10–20)
AORTIC VALVE PEAK VELOCITY: 1.63 M/S
AV PEAK GRADIENT: 11 MMHG
AVA (PEAK VEL): 1.22 CM2
BUN SERPL-MCNC: 48 MG/DL (ref 6–23)
CALCIUM SERPL-MCNC: 8.4 MG/DL (ref 8.6–10.3)
CHLORIDE SERPL-SCNC: 100 MMOL/L (ref 98–107)
CO2 SERPL-SCNC: 24 MMOL/L (ref 21–32)
CREAT SERPL-MCNC: 2.09 MG/DL (ref 0.5–1.05)
EGFRCR SERPLBLD CKD-EPI 2021: 22 ML/MIN/1.73M*2
EJECTION FRACTION APICAL 4 CHAMBER: 12.1
EJECTION FRACTION: 25 %
ERYTHROCYTE [DISTWIDTH] IN BLOOD BY AUTOMATED COUNT: 16.1 % (ref 11.5–14.5)
GLUCOSE BLD MANUAL STRIP-MCNC: 201 MG/DL (ref 74–99)
GLUCOSE BLD MANUAL STRIP-MCNC: 253 MG/DL (ref 74–99)
GLUCOSE BLD MANUAL STRIP-MCNC: 285 MG/DL (ref 74–99)
GLUCOSE BLD MANUAL STRIP-MCNC: 303 MG/DL (ref 74–99)
GLUCOSE BLD MANUAL STRIP-MCNC: 346 MG/DL (ref 74–99)
GLUCOSE SERPL-MCNC: 249 MG/DL (ref 74–99)
HCT VFR BLD AUTO: 56.4 % (ref 36–46)
HGB BLD-MCNC: 16.4 G/DL (ref 12–16)
LEFT VENTRICLE INTERNAL DIMENSION DIASTOLE: 3.93 CM (ref 3.5–6)
LEFT VENTRICULAR OUTFLOW TRACT DIAMETER: 2.07 CM
LV EJECTION FRACTION BIPLANE: 25 %
MAGNESIUM SERPL-MCNC: 2.23 MG/DL (ref 1.6–2.4)
MCH RBC QN AUTO: 25.5 PG (ref 26–34)
MCHC RBC AUTO-ENTMCNC: 29.1 G/DL (ref 32–36)
MCV RBC AUTO: 88 FL (ref 80–100)
NRBC BLD-RTO: 0 /100 WBCS (ref 0–0)
PLATELET # BLD AUTO: 104 X10*3/UL (ref 150–450)
POTASSIUM SERPL-SCNC: 4.4 MMOL/L (ref 3.5–5.3)
RBC # BLD AUTO: 6.43 X10*6/UL (ref 4–5.2)
RIGHT VENTRICLE FREE WALL PEAK S': 7 CM/S
RIGHT VENTRICLE PEAK SYSTOLIC PRESSURE: 41.3 MMHG
SODIUM SERPL-SCNC: 137 MMOL/L (ref 136–145)
TRICUSPID ANNULAR PLANE SYSTOLIC EXCURSION: 1.2 CM
WBC # BLD AUTO: 6.8 X10*3/UL (ref 4.4–11.3)

## 2024-12-31 PROCEDURE — 2500000004 HC RX 250 GENERAL PHARMACY W/ HCPCS (ALT 636 FOR OP/ED): Performed by: PHYSICIAN ASSISTANT

## 2024-12-31 PROCEDURE — 2500000004 HC RX 250 GENERAL PHARMACY W/ HCPCS (ALT 636 FOR OP/ED): Mod: JZ | Performed by: NURSE PRACTITIONER

## 2024-12-31 PROCEDURE — 2500000002 HC RX 250 W HCPCS SELF ADMINISTERED DRUGS (ALT 637 FOR MEDICARE OP, ALT 636 FOR OP/ED): Performed by: NURSE PRACTITIONER

## 2024-12-31 PROCEDURE — C8929 TTE W OR WO FOL WCON,DOPPLER: HCPCS

## 2024-12-31 PROCEDURE — 2500000001 HC RX 250 WO HCPCS SELF ADMINISTERED DRUGS (ALT 637 FOR MEDICARE OP): Performed by: NURSE PRACTITIONER

## 2024-12-31 PROCEDURE — 97161 PT EVAL LOW COMPLEX 20 MIN: CPT | Mod: GP

## 2024-12-31 PROCEDURE — 83735 ASSAY OF MAGNESIUM: CPT | Performed by: NURSE PRACTITIONER

## 2024-12-31 PROCEDURE — 97530 THERAPEUTIC ACTIVITIES: CPT | Mod: GP

## 2024-12-31 PROCEDURE — 1200000002 HC GENERAL ROOM WITH TELEMETRY DAILY

## 2024-12-31 PROCEDURE — 2500000001 HC RX 250 WO HCPCS SELF ADMINISTERED DRUGS (ALT 637 FOR MEDICARE OP): Performed by: PHYSICIAN ASSISTANT

## 2024-12-31 PROCEDURE — 85027 COMPLETE CBC AUTOMATED: CPT | Performed by: NURSE PRACTITIONER

## 2024-12-31 PROCEDURE — 36415 COLL VENOUS BLD VENIPUNCTURE: CPT | Performed by: NURSE PRACTITIONER

## 2024-12-31 PROCEDURE — 2500000004 HC RX 250 GENERAL PHARMACY W/ HCPCS (ALT 636 FOR OP/ED): Performed by: INTERNAL MEDICINE

## 2024-12-31 PROCEDURE — 82947 ASSAY GLUCOSE BLOOD QUANT: CPT

## 2024-12-31 PROCEDURE — 2500000004 HC RX 250 GENERAL PHARMACY W/ HCPCS (ALT 636 FOR OP/ED): Performed by: NURSE PRACTITIONER

## 2024-12-31 PROCEDURE — 80048 BASIC METABOLIC PNL TOTAL CA: CPT | Performed by: NURSE PRACTITIONER

## 2024-12-31 PROCEDURE — 97166 OT EVAL MOD COMPLEX 45 MIN: CPT | Mod: GO

## 2024-12-31 RX ORDER — MORPHINE SULFATE 2 MG/ML
2 INJECTION, SOLUTION INTRAMUSCULAR; INTRAVENOUS EVERY 4 HOURS PRN
Status: DISCONTINUED | OUTPATIENT
Start: 2024-12-31 | End: 2025-01-02

## 2024-12-31 RX ORDER — OXYCODONE HYDROCHLORIDE 5 MG/1
5 TABLET ORAL EVERY 4 HOURS PRN
Status: DISCONTINUED | OUTPATIENT
Start: 2024-12-31 | End: 2025-01-02

## 2024-12-31 RX ORDER — SODIUM CHLORIDE, SODIUM LACTATE, POTASSIUM CHLORIDE, CALCIUM CHLORIDE 600; 310; 30; 20 MG/100ML; MG/100ML; MG/100ML; MG/100ML
75 INJECTION, SOLUTION INTRAVENOUS CONTINUOUS
Status: ACTIVE | OUTPATIENT
Start: 2024-12-31 | End: 2025-01-01

## 2024-12-31 RX ORDER — OXYCODONE HYDROCHLORIDE 5 MG/1
2.5 TABLET ORAL EVERY 4 HOURS PRN
Status: DISCONTINUED | OUTPATIENT
Start: 2024-12-31 | End: 2025-01-02

## 2024-12-31 RX ADMIN — PERFLUTREN 10 ML OF DILUTION: 6.52 INJECTION, SUSPENSION INTRAVENOUS at 08:03

## 2024-12-31 RX ADMIN — CEFTRIAXONE SODIUM 1 G: 1 INJECTION, SOLUTION INTRAVENOUS at 21:02

## 2024-12-31 RX ADMIN — CEFAZOLIN SODIUM 2 G: 2 INJECTION, SOLUTION INTRAVENOUS at 01:45

## 2024-12-31 RX ADMIN — INSULIN LISPRO 8 UNITS: 100 INJECTION, SOLUTION INTRAVENOUS; SUBCUTANEOUS at 18:14

## 2024-12-31 RX ADMIN — CEFAZOLIN SODIUM 2 G: 2 INJECTION, SOLUTION INTRAVENOUS at 12:09

## 2024-12-31 RX ADMIN — OXYCODONE HYDROCHLORIDE 5 MG: 5 TABLET ORAL at 15:56

## 2024-12-31 RX ADMIN — SODIUM CHLORIDE, POTASSIUM CHLORIDE, SODIUM LACTATE AND CALCIUM CHLORIDE 75 ML/HR: 600; 310; 30; 20 INJECTION, SOLUTION INTRAVENOUS at 15:56

## 2024-12-31 RX ADMIN — BRIMONIDINE TARTRATE 1 DROP: 2 SOLUTION/ DROPS OPHTHALMIC at 10:07

## 2024-12-31 RX ADMIN — INSULIN LISPRO 6 UNITS: 100 INJECTION, SOLUTION INTRAVENOUS; SUBCUTANEOUS at 10:06

## 2024-12-31 RX ADMIN — DORZOLAMIDE HYDROCHLORIDE AND TIMOLOL MALEATE 1 DROP: 20; 5 SOLUTION/ DROPS OPHTHALMIC at 10:10

## 2024-12-31 RX ADMIN — DORZOLAMIDE HYDROCHLORIDE AND TIMOLOL MALEATE 1 DROP: 20; 5 SOLUTION/ DROPS OPHTHALMIC at 21:03

## 2024-12-31 RX ADMIN — ONDANSETRON 4 MG: 2 INJECTION INTRAMUSCULAR; INTRAVENOUS at 23:24

## 2024-12-31 RX ADMIN — ACETAMINOPHEN 650 MG: 325 TABLET ORAL at 23:35

## 2024-12-31 RX ADMIN — INSULIN LISPRO 6 UNITS: 100 INJECTION, SOLUTION INTRAVENOUS; SUBCUTANEOUS at 21:09

## 2024-12-31 RX ADMIN — MORPHINE SULFATE 2 MG: 2 INJECTION, SOLUTION INTRAMUSCULAR; INTRAVENOUS at 12:09

## 2024-12-31 RX ADMIN — BRIMONIDINE TARTRATE 1 DROP: 2 SOLUTION/ DROPS OPHTHALMIC at 21:02

## 2024-12-31 RX ADMIN — ROSUVASTATIN CALCIUM 5 MG: 5 TABLET, FILM COATED ORAL at 21:03

## 2024-12-31 RX ADMIN — FUROSEMIDE 40 MG: 40 TABLET ORAL at 10:07

## 2024-12-31 RX ADMIN — OXYCODONE HYDROCHLORIDE 2.5 MG: 5 TABLET ORAL at 23:35

## 2024-12-31 ASSESSMENT — COGNITIVE AND FUNCTIONAL STATUS - GENERAL
DAILY ACTIVITIY SCORE: 14
MOBILITY SCORE: 11
TOILETING: A LOT
MOVING TO AND FROM BED TO CHAIR: A LOT
HELP NEEDED FOR BATHING: A LOT
DRESSING REGULAR UPPER BODY CLOTHING: A LOT
EATING MEALS: A LITTLE
TURNING FROM BACK TO SIDE WHILE IN FLAT BAD: A LOT
CLIMB 3 TO 5 STEPS WITH RAILING: TOTAL
PERSONAL GROOMING: A LITTLE
WALKING IN HOSPITAL ROOM: A LOT
DRESSING REGULAR LOWER BODY CLOTHING: A LOT
STANDING UP FROM CHAIR USING ARMS: A LOT
MOVING FROM LYING ON BACK TO SITTING ON SIDE OF FLAT BED WITH BEDRAILS: A LOT

## 2024-12-31 ASSESSMENT — ACTIVITIES OF DAILY LIVING (ADL)
BATHING_ASSISTANCE: MAXIMAL
ADL_ASSISTANCE: INDEPENDENT

## 2024-12-31 ASSESSMENT — PAIN DESCRIPTION - ORIENTATION: ORIENTATION: LEFT

## 2024-12-31 ASSESSMENT — PAIN - FUNCTIONAL ASSESSMENT
PAIN_FUNCTIONAL_ASSESSMENT: 0-10

## 2024-12-31 ASSESSMENT — PAIN SCALES - GENERAL
PAINLEVEL_OUTOF10: 4
PAINLEVEL_OUTOF10: 6
PAINLEVEL_OUTOF10: 7
PAINLEVEL_OUTOF10: 10 - WORST POSSIBLE PAIN

## 2024-12-31 ASSESSMENT — PAIN DESCRIPTION - LOCATION: LOCATION: HIP

## 2024-12-31 ASSESSMENT — PAIN SCALES - WONG BAKER: WONGBAKER_NUMERICALRESPONSE: HURTS WORST

## 2024-12-31 NOTE — PROGRESS NOTES
Spiritual Care Visit  Spiritual Care Request    Reason for Visit:  Routine Visit: Introduction     Request Received From:       Focus of Care:  Visited With: Patient         Refer to :          Spiritual Care Assessment    Spiritual Assessment:                      Care Provided:  Intended Effects: Promote sense of peace, Preserve dignity and respect, Meaning-making  Methods: Offer spiritual/Sabianist support  Interventions: [place], Share words of hope and inspiration    Sense of Community and or Jew Affiliation:  Presbyterian         Addressed Needs/Concerns and/or Latasha Through:          Outcome:        Advance Directives:         Spiritual Care Annotation    Annotation:  Patient had kentrell questions and talked about her beliefs.   prayed at her request.

## 2024-12-31 NOTE — PROGRESS NOTES
[facility] and [facility] SNF unable to accept. 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 Reviewed the [facility] generated SNF list with her and the medicare star ratings. Pt stated she preferred [facility] SNF d/t the medicare rating. Request sent to [facility] (Colette) to send referral for review.   KRUNAL Cristobal

## 2024-12-31 NOTE — ASSESSMENT & PLAN NOTE
Left hip fracture status post open reduction internal fixation patient is doing better  ALESIA start patient on IV hydration  History of atrial fibrillation and coronary artery disease currently stable cardiology note was reviewed  History of diabetes keep patient on sliding scale

## 2024-12-31 NOTE — PROGRESS NOTES
Subjective Data:  I48.91 Atrial fibrillation RVR  I25.1 CAD prior stents prior non-STEMI  I50.3 Diastolic heart failure  I79 previous stent right SFA popliteal artery angioplasty right PT peroneal artery  N18.for stage IV chronic kidney disease  E11.9 Type 2 diabetes  W19.X XA mechanical fall at home hip fracture    Overnight Events:    Echo showed decline in function mild to moderate aortic valve stenosis  Valve poorly visualized with Definity ejection fraction appeared to be 25 to 35% with distal septal apical akinesia but no thrombus  I note from the office cardiology notes from Tracy Schwab past history of poor compliance and I stressed to the patient and the son the need for follow-up and compliance  The patient mistakenly thinks that taking the Xarelto by itself is sufficient  Previous pharmacologic stress test anterolateral scar 20%  Poor compliance with metoprolol Cardizem it appears  Prior echo 30 to 35%  PAD status post stent and angioplasty see consult note  Her vital signs are stable she is surprisingly asymptomatic optimize meds and arrange follow-up with cardiology     Objective Data:  Last Recorded Vitals:  Vitals:    12/31/24 0800 12/31/24 1200 12/31/24 1214 12/31/24 1600   BP: 109/75 112/56 102/64 117/59   BP Location: Right arm Left arm  Right arm   Patient Position: Lying Lying  Sitting   Pulse: 54 66 63 79   Resp: 16 16  16   Temp: 36.7 °C (98.1 °F) 36.7 °C (98.1 °F)  36.5 °C (97.7 °F)   TempSrc: Temporal Temporal  Temporal   SpO2: 93% 93% 96% 92%   Weight:       Height:           Last Labs:  CBC - 12/31/2024:  6:06 AM  6.8 16.4 104    56.4      CMP - 12/31/2024:  6:06 AM  8.4 7.7 23 --- 1.4   3.8 4.3 14 96      PTT - No results in last year.  1.8   20.0 _     TROPHS   Date/Time Value Ref Range Status   12/30/2024 06:40  0 - 13 ng/L Final     Comment:     Previous result verified on 12/29/2024 2026 on specimen/case [id number] called with component [facility] for procedure Troponin I, High  Sensitivity with value 131 ng/L.   12/29/2024 10:29  0 - 13 ng/L Final     Comment:     Previous result verified on 12/29/2024 2026 on specimen/case [id number] called with component [facility] for procedure Troponin I, High Sensitivity with value 131 ng/L.   12/29/2024 07:49  0 - 13 ng/L Final     BNP   Date/Time Value Ref Range Status   10/06/2020 12:42  0 - 99 pg/mL Final     Comment:     .  <100 pg/mL - Heart failure unlikely  100-299 pg/mL - Intermediate probability of acute heart  .               failure exacerbation. Correlate with clinical  .               context and patient history.    >=300 pg/mL - Heart Failure likely. Correlate with clinical  .               context and patient history.  BNP testing is performed using different testing   methodology at [facility] than at other   [facility]. Direct result comparisons should   only be made within the same method.     05/15/2020 02:01 PM 1,116 0 - 99 pg/mL Final     Comment:     .  <100 pg/mL - Heart failure unlikely  100-299 pg/mL - Intermediate probability of acute heart  .               failure exacerbation. Correlate with clinical  .               context and patient history.    >=300 pg/mL - Heart Failure likely. Correlate with clinical  .               context and patient history.   Biotin interference may cause falsely decreased results.   Patients taking a Biotin dose of up to 5 mg/day should   refrain from taking Biotin for 24 hours before sample   collection. Providers may contact their local laboratory   for further information.       HGBA1C   Date/Time Value Ref Range Status   05/15/2020 02:01 PM 6.6 % Final     Comment:          Diagnosis of Diabetes-Adults   Non-Diabetic: < or = 5.6%   Increased risk for developing diabetes: 5.7-6.4%   Diagnostic of diabetes: > or = 6.5%  .       Monitoring of Diabetes                Age (y)     Therapeutic Goal (%)   Adults:          >18           <7.0   Pediatrics:     13-18           <7.5                   7-12           <8.0                   0- 6            7.5-8.5   American Diabetes Association. Diabetes Care 33(S1), Jan 2010.     07/04/2019 05:18 AM 7.5 % Final     Comment:          Diagnosis of Diabetes-Adults   Non-Diabetic: < or = 5.6%   Increased risk for developing diabetes: 5.7-6.4%   Diagnostic of diabetes: > or = 6.5%  .       Monitoring of Diabetes                Age (y)     Therapeutic Goal (%)   Adults:          >18           <7.0   Pediatrics:    13-18           <7.5                   7-12           <8.0                   0- 6            7.5-8.5   American Diabetes Association. Diabetes Care 33(S1), Jan 2010.       VLDL   Date/Time Value Ref Range Status   05/15/2020 02:01 PM 28 0 - 40 mg/dL Final   08/14/2018 08:31 AM 23 0 - 40 mg/dL Final      Last I/O:  I/O last 3 completed shifts:  In: 1751.7 (24 mL/kg) [I.V.:901.7 (12.4 mL/kg); IV Piggyback:850]  Out: 380 (5.2 mL/kg) [Urine:350 (0.1 mL/kg/hr); Blood:30]  Weight: 73 kg     Past Cardiology Tests (Last 3 Years):  EKG:  ECG 12 lead 12/29/2024      ECG 12 lead (Clinic Performed) 06/05/2024      ECG 12 lead (Clinic Performed) 02/06/2024    Echo:  Transthoracic Echo (TTE) Complete 12/31/2024    Ejection Fractions:  EF   Date/Time Value Ref Range Status   12/31/2024 08:10 AM 25 %      Cath:  No results found for this or any previous visit from the past 1095 days.    Stress Test:  No results found for this or any previous visit from the past 1095 days.    Cardiac Imaging:  No results found for this or any previous visit from the past 1095 days.      Inpatient Medications:  Scheduled medications   Medication Dose Route Frequency    brimonidine  1 drop Left Eye BID    cefTRIAXone  1 g intravenous q24h    dorzolamide-timoloL  1 drop Left Eye BID    furosemide  40 mg oral Daily    insulin lispro  0-10 Units subcutaneous Before meals & nightly    insulin lispro protamin-lispro  15 Units subcutaneous BID AC    lidocaine  2  patch transdermal Nightly    [Held by provider] rivaroxaban  10 mg oral Daily    rosuvastatin  5 mg oral q PM     PRN medications   Medication    acetaminophen    Or    acetaminophen    Or    acetaminophen    dextrose    dextrose    glucagon    glucagon    melatonin    morphine    ondansetron    oxyCODONE    oxyCODONE    oxygen    polyethylene glycol     Continuous Medications   Medication Dose Last Rate    lactated Ringer's  75 mL/hr 75 mL/hr (12/31/24 9766)     Results for orders placed or performed during the hospital encounter of 12/29/24 (from the past 96 hours)   CBC and Auto Differential   Result Value Ref Range    WBC 16.6 (H) 4.4 - 11.3 x10*3/uL    nRBC 0.0 0.0 - 0.0 /100 WBCs    RBC 5.03 4.00 - 5.20 x10*6/uL    Hemoglobin 13.1 12.0 - 16.0 g/dL    Hematocrit 43.5 36.0 - 46.0 %    MCV 87 80 - 100 fL    MCH 26.0 26.0 - 34.0 pg    MCHC 30.1 (L) 32.0 - 36.0 g/dL    RDW 14.9 (H) 11.5 - 14.5 %    Platelets 280 150 - 450 x10*3/uL    Neutrophils % 89.4 40.0 - 80.0 %    Immature Granulocytes %, Automated 0.5 0.0 - 0.9 %    Lymphocytes % 3.5 13.0 - 44.0 %    Monocytes % 6.3 2.0 - 10.0 %    Eosinophils % 0.1 0.0 - 6.0 %    Basophils % 0.2 0.0 - 2.0 %    Neutrophils Absolute 14.81 (H) 1.60 - 5.50 x10*3/uL    Immature Granulocytes Absolute, Automated 0.08 0.00 - 0.50 x10*3/uL    Lymphocytes Absolute 0.58 (L) 0.80 - 3.00 x10*3/uL    Monocytes Absolute 1.04 (H) 0.05 - 0.80 x10*3/uL    Eosinophils Absolute 0.01 0.00 - 0.40 x10*3/uL    Basophils Absolute 0.03 0.00 - 0.10 x10*3/uL   Comprehensive metabolic panel   Result Value Ref Range    Glucose 188 (H) 74 - 99 mg/dL    Sodium 140 136 - 145 mmol/L    Potassium 3.7 3.5 - 5.3 mmol/L    Chloride 100 98 - 107 mmol/L    Bicarbonate 26 21 - 32 mmol/L    Anion Gap 18 10 - 20 mmol/L    Urea Nitrogen 30 (H) 6 - 23 mg/dL    Creatinine 1.35 (H) 0.50 - 1.05 mg/dL    eGFR 37 (L) >60 mL/min/1.73m*2    Calcium 9.8 8.6 - 10.3 mg/dL    Albumin 4.3 3.4 - 5.0 g/dL    Alkaline Phosphatase 96  33 - 136 U/L    Total Protein 7.7 6.4 - 8.2 g/dL    AST 23 9 - 39 U/L    Bilirubin, Total 1.4 (H) 0.0 - 1.2 mg/dL    ALT 14 7 - 45 U/L   Protime-INR   Result Value Ref Range    Protime 20.0 (H) 9.8 - 12.8 seconds    INR 1.8 (H) 0.9 - 1.1   ECG 12 lead   Result Value Ref Range    Ventricular Rate 101 BPM    QRS Duration 96 ms    QT Interval 372 ms    QTC Calculation(Bazett) 482 ms    R Axis 83 degrees    T Axis 90 degrees    QRS Count 17 beats    Q Onset 219 ms    T Offset 405 ms    QTC Fredericia 442 ms   Troponin I, High Sensitivity   Result Value Ref Range    Troponin I, High Sensitivity 131 (HH) 0 - 13 ng/L   Type and screen   Result Value Ref Range    ABO TYPE O     Rh TYPE POS     ANTIBODY SCREEN NEG    POCT GLUCOSE   Result Value Ref Range    POCT Glucose 230 (H) 74 - 99 mg/dL   Troponin I, High Sensitivity   Result Value Ref Range    Troponin I, High Sensitivity 153 (HH) 0 - 13 ng/L   POCT GLUCOSE   Result Value Ref Range    POCT Glucose 262 (H) 74 - 99 mg/dL   POCT GLUCOSE   Result Value Ref Range    POCT Glucose 210 (H) 74 - 99 mg/dL   POCT GLUCOSE   Result Value Ref Range    POCT Glucose 194 (H) 74 - 99 mg/dL   CBC   Result Value Ref Range    WBC 12.6 (H) 4.4 - 11.3 x10*3/uL    nRBC 0.0 0.0 - 0.0 /100 WBCs    RBC 4.45 4.00 - 5.20 x10*6/uL    Hemoglobin 11.3 (L) 12.0 - 16.0 g/dL    Hematocrit 38.4 36.0 - 46.0 %    MCV 86 80 - 100 fL    MCH 25.4 (L) 26.0 - 34.0 pg    MCHC 29.4 (L) 32.0 - 36.0 g/dL    RDW 15.3 (H) 11.5 - 14.5 %    Platelets 258 150 - 450 x10*3/uL   Basic metabolic panel   Result Value Ref Range    Glucose 206 (H) 74 - 99 mg/dL    Sodium 141 136 - 145 mmol/L    Potassium 4.7 3.5 - 5.3 mmol/L    Chloride 102 98 - 107 mmol/L    Bicarbonate 25 21 - 32 mmol/L    Anion Gap 19 10 - 20 mmol/L    Urea Nitrogen 33 (H) 6 - 23 mg/dL    Creatinine 1.36 (H) 0.50 - 1.05 mg/dL    eGFR 36 (L) >60 mL/min/1.73m*2    Calcium 9.3 8.6 - 10.3 mg/dL   Magnesium   Result Value Ref Range    Magnesium 2.32 1.60 - 2.40  mg/dL   Phosphorus   Result Value Ref Range    Phosphorus 3.8 2.5 - 4.9 mg/dL   Troponin I, High Sensitivity   Result Value Ref Range    Troponin I, High Sensitivity 148 (HH) 0 - 13 ng/L   Urinalysis with Reflex Microscopic   Result Value Ref Range    Color, Urine Yellow Light-Yellow, Yellow, Dark-Yellow    Appearance, Urine Turbid (N) Clear    Specific Gravity, Urine 1.028 1.005 - 1.035    pH, Urine 6.0 5.0, 5.5, 6.0, 6.5, 7.0, 7.5, 8.0    Protein, Urine 50 (1+) (A) NEGATIVE, 10 (TRACE), 20 (TRACE) mg/dL    Glucose, Urine OVER (4+) (A) Normal mg/dL    Blood, Urine 0.03 (TRACE) (A) NEGATIVE    Ketones, Urine 20 (1+) (A) NEGATIVE mg/dL    Bilirubin, Urine NEGATIVE NEGATIVE    Urobilinogen, Urine Normal Normal mg/dL    Nitrite, Urine NEGATIVE NEGATIVE    Leukocyte Esterase, Urine 500 Jem/µL (A) NEGATIVE   Microscopic Only, Urine   Result Value Ref Range    WBC, Urine >50 (A) 1-5, NONE /HPF    WBC Clumps, Urine OCCASIONAL Reference range not established. /HPF    RBC, Urine >20 (A) NONE, 1-2, 3-5 /HPF    Squamous Epithelial Cells, Urine 1-9 (SPARSE) Reference range not established. /HPF    Bacteria, Urine 4+ (A) NONE SEEN /HPF    Mucus, Urine FEW Reference range not established. /LPF   Urinalysis with Reflex Culture and Microscopic   Result Value Ref Range    Color, Urine Yellow Light-Yellow, Yellow, Dark-Yellow    Appearance, Urine Turbid (N) Clear    Specific Gravity, Urine 1.028 1.005 - 1.035    pH, Urine 6.0 5.0, 5.5, 6.0, 6.5, 7.0, 7.5, 8.0    Protein, Urine 50 (1+) (A) NEGATIVE, 10 (TRACE), 20 (TRACE) mg/dL    Glucose, Urine OVER (4+) (A) Normal mg/dL    Blood, Urine 0.06 (1+) (A) NEGATIVE    Ketones, Urine 20 (1+) (A) NEGATIVE mg/dL    Bilirubin, Urine NEGATIVE NEGATIVE    Urobilinogen, Urine Normal Normal mg/dL    Nitrite, Urine NEGATIVE NEGATIVE    Leukocyte Esterase, Urine 500 Jem/µL (A) NEGATIVE   Extra Urine Gray Tube   Result Value Ref Range    Extra Tube Hold for add-ons.    Microscopic Only, Urine    Result Value Ref Range    WBC, Urine >50 (A) 1-5, NONE /HPF    WBC Clumps, Urine FEW Reference range not established. /HPF    RBC, Urine >20 (A) NONE, 1-2, 3-5 /HPF    Squamous Epithelial Cells, Urine 1-9 (SPARSE) Reference range not established. /HPF    Bacteria, Urine 4+ (A) NONE SEEN /HPF   Urine Culture    Specimen: Clean Catch/Voided; Urine   Result Value Ref Range    Urine Culture >=100,000 CFU/mL Enteric bacilli (A)    POCT GLUCOSE   Result Value Ref Range    POCT Glucose 272 (H) 74 - 99 mg/dL   POCT GLUCOSE   Result Value Ref Range    POCT Glucose 229 (H) 74 - 99 mg/dL   POCT GLUCOSE   Result Value Ref Range    POCT Glucose 283 (H) 74 - 99 mg/dL   CBC   Result Value Ref Range    WBC 6.8 4.4 - 11.3 x10*3/uL    nRBC 0.0 0.0 - 0.0 /100 WBCs    RBC 6.43 (H) 4.00 - 5.20 x10*6/uL    Hemoglobin 16.4 (H) 12.0 - 16.0 g/dL    Hematocrit 56.4 (H) 36.0 - 46.0 %    MCV 88 80 - 100 fL    MCH 25.5 (L) 26.0 - 34.0 pg    MCHC 29.1 (L) 32.0 - 36.0 g/dL    RDW 16.1 (H) 11.5 - 14.5 %    Platelets 104 (L) 150 - 450 x10*3/uL   Basic metabolic panel   Result Value Ref Range    Glucose 249 (H) 74 - 99 mg/dL    Sodium 137 136 - 145 mmol/L    Potassium 4.4 3.5 - 5.3 mmol/L    Chloride 100 98 - 107 mmol/L    Bicarbonate 24 21 - 32 mmol/L    Anion Gap 17 10 - 20 mmol/L    Urea Nitrogen 48 (H) 6 - 23 mg/dL    Creatinine 2.09 (H) 0.50 - 1.05 mg/dL    eGFR 22 (L) >60 mL/min/1.73m*2    Calcium 8.4 (L) 8.6 - 10.3 mg/dL   Magnesium   Result Value Ref Range    Magnesium 2.23 1.60 - 2.40 mg/dL   Transthoracic Echo (TTE) Complete   Result Value Ref Range    AV pk laron 1.63 m/s    LV Biplane EF 25 %    LVOT diam 2.07 cm    Tricuspid annular plane systolic excursion 1.2 cm    LV EF 25 %    RV free wall pk S' 7.00 cm/s    RVSP 41.3 mmHg    LVIDd 3.93 cm    Aortic Valve Area by Continuity of Peak Velocity 1.22 cm2    AV pk grad 11 mmHg    LV A4C EF 12.1    POCT GLUCOSE   Result Value Ref Range    POCT Glucose 253 (H) 74 - 99 mg/dL   POCT GLUCOSE    Result Value Ref Range    POCT Glucose 303 (H) 74 - 99 mg/dL   POCT GLUCOSE   Result Value Ref Range    POCT Glucose 346 (H) 74 - 99 mg/dL       Physical Exam:  Subjective:   Examination:   General Examination:   General Appearance: Well developed, well nourished, in no acute distress.   Head: normocephalic, atraumatic   Eyes: Anicteric sclera. Pupils are equally round and reactive to light.  Extraocular movements are intact.    Ears: normal   Oral: Cavity: mucosa moist.   Throat: clear.   Neck/Thyroid: neck supple, full range of motion, no cervical lymphadenopathy.   Skin: warm and dry, no suspicious lesions.    Heart: regular rate and rhythm, S1, S2 normal, no murmurs.   Lungs: clear to auscultation bilaterally.   Abdomen: soft, non-tender, non-distended, bowl sound present, normal.   Extremities: no clubbing, no cyanosis, no edema.  Status post hip ORIF  Neuro: non-focal, motor strength normal upper lower extremities, sensory exam intact.       Assessment/Plan   I48.91 Atrial fibrillation RVR  I25.1 CAD prior stents prior non-STEMI  I50.3 Diastolic heart failure  I79 previous stent right SFA popliteal artery angioplasty right PT peroneal artery  N18.for stage IV chronic kidney disease  E11.9 Type 2 diabetes  W19.X XA mechanical fall at home hip fracture      Code Status:  DNR and No Intubation    I spent 35 minutes in the professional and overall care of this patient.        Olman Cornejo MD

## 2024-12-31 NOTE — PROGRESS NOTES
Occupational Therapy    Evaluation    Patient Name: Dena Shotr  MRN: [id number]  Department: [address]  Room: [address] [place] [address]-A  Today's Date: 12/31/2024       Assessment  IP OT Assessment  OT Assessment: Patient would benefit from additional skilled rehab at a moderate intensity to fully maximize independence in adls, activity tolerance for adls, and functional mobility tasks for adls.  Prognosis: Good  Evaluation/Treatment Tolerance: Patient tolerated treatment well  End of Session Communication: Bedside nurse  End of Session Patient Position: Up in chair, Alarm on  Plan:  Treatment Interventions: ADL retraining, Functional transfer training  OT Frequency: 5 times per week  OT Discharge Recommendations: Moderate intensity level of continued care  OT - OK to Discharge: Yes (to next level of care when cleared by medical team.)    Subjective   Current Problem:  1. Closed fracture of left hip, initial encounter  Case Request Operating Room: Hip Hemiarthroplasty    Case Request Operating Room: Hip Hemiarthroplasty      2. Fall, initial encounter        3. History of non-ST elevation myocardial infarction (NSTEMI)  Transthoracic Echo (TTE) Complete    Transthoracic Echo (TTE) Complete      4. Closed fracture of right hip, initial encounter  Transthoracic Echo (TTE) Complete    Transthoracic Echo (TTE) Complete      5. Preop cardiovascular exam        6. Ischemic cardiomyopathy          General:  General  Reason for Referral: L hip hemiarthroplasty 12/30/24, activities of daily living  Referred By: Gary  Past Medical History Relevant to Rehab: PMH: cardiac cath, NSTEMI, heart failure, AFIB, PAD, DM  Co-Treatment: PT  Co-Treatment Reason: patient safety  Prior to Session Communication: Bedside nurse  Patient Position Received: Bed, 2 rail up, Alarm on  Preferred Learning Style: verbal  General Comment: Agreeable to participate, cues to redirect back to eval tasks throughout session.  Precautions:  Medical Precautions: Fall  precautions  Post-Surgical Precautions: Left hip precautions (WBAT L LE)    Vital Sign (Past 2hrs)        Date/Time Vitals Session Patient Position Pulse Resp SpO2 BP MAP (mmHg)    12/31/24 1200 --  --  66  16  93 %  112/56  79                        Pain:  Pain Assessment  Pain Assessment: 0-10  0-10 (Numeric) Pain Score:  (Patient did not rate)  Pain Type: Surgical pain  Pain Location: Hip  Pain Orientation: Left  Pain Interventions: Medication (See MAR) (Medicated for pain at start of eval.)    Objective   Cognition:  Orientation Level: Disoriented to time  Insight: Mild  Impulsive: Mildly (Cues to redirect back to eval tasks, patient frequently joking, appears mildly forgetful at times.)           Home Living:  Type of Home: Apartment  Lives With: Alone  Home Living Comments: Patient lives alone in a 3rd floor apartment, elevator access. Tub shower with grab bars and seat. PRN use of rolling walker and cane pta.   Prior Function:  Prior Function Comments: Per patient and friend in rooms reports,  pta was independent with UE and LE adls, home management. Has friends that live close by that assist with home management as needed and provide driving/shopping pta/.  IADL History:     ADL:  Eating Assistance: Stand by  Grooming Assistance: Minimal  Bathing Assistance: Maximal  UE Dressing Assistance: Moderate  LE Dressing Assistance: Maximal  Activity Tolerance:  Endurance: Tolerates 10 - 20 min exercise with multiple rests  Bed Mobility/Transfers: Bed Mobility  Bed Mobility:  (MAX X 2 sup to sit eob. MAX X 2 sit to stand. MAX X 2 w/gait belt and RW to bedside chair. MAX X 2 stand to sit in chair. Once in chair c/o dizziness. Nursing brought to room. 102/64 BP in chair. Patient feeling better. Nursing in room. Chair check on.)       Sensation:  Light Touch: No apparent deficits  Strength:  Strength Comments: B UE strength 4-/5 overall  Perception:     Coordination:  Movements are Fluid and Coordinated: Yes   Hand  Function:  Hand Function  Gross Grasp: Functional    Outcome Measures: [facility] Daily Activity  Putting on and taking off regular lower body clothing: A lot  Bathing (including washing, rinsing, drying): A lot  Putting on and taking off regular upper body clothing: A lot  Toileting, which includes using toilet, bedpan or urinal: A lot  Taking care of personal grooming such as brushing teeth: A little  Eating Meals: A little  Daily Activity - Total Score: 14         and    Education Documentation  No documentation found.  Education Comments  No comments found.      Goals:   Encounter Problems       Encounter Problems (Active)       OT Goals       Patient will complete UE adls with MOD I. (Progressing)       Start:  12/31/24    Expected End:  01/14/25            Patient will complete LE adls with MOD I. (Progressing)       Start:  12/31/24    Expected End:  01/14/25            Patient will complete transfers with MOD I. (Progressing)       Start:  12/31/24    Expected End:  01/14/25            Patient will complete functional mobility tasks with MOD I. (Progressing)       Start:  12/31/24    Expected End:  01/14/25

## 2024-12-31 NOTE — PROGRESS NOTES
12/31/24 1056   Discharge Planning   Living Arrangements Alone   Support Systems Family members   Type of Residence Private residence   Home or Post Acute Services Post acute facilities (Rehab/SNF/etc)   Type of Post Acute Facility Services Skilled nursing   Expected Discharge Disposition SNF   Does the patient need discharge transport arranged? Yes   RoundTrip coordination needed? Yes     Informed patient that [facility] is out of network with [place]. Pt would like referral sent to [facility]. Requested [facility] send referral. SW updated.

## 2024-12-31 NOTE — PROGRESS NOTES
Dena Short is a 89 y.o. female on day 2 of admission presenting with Closed fracture of left hip, initial encounter.    Subjective   Lying in bed verbalizing no complaints at this time.  States pain in left hip area is more than 50% better though prior to surgery.  No complaints of chest pain, shortness of breath, lightheaded or dizziness.  Tolerating a diet with no nausea vomiting.  Encourage use of incentive spirometry.       Objective     Physical Exam  Constitutional:       Appearance: Normal appearance.   HENT:      Head: Normocephalic and atraumatic.      Nose: Nose normal.      Mouth/Throat:      Mouth: Mucous membranes are moist.   Cardiovascular:      Rate and Rhythm: Normal rate.   Pulmonary:      Effort: Pulmonary effort is normal.   Abdominal:      General: Abdomen is flat.      Palpations: Abdomen is soft.   Musculoskeletal:      Cervical back: Neck supple.      Comments: Left hip with surgical dressing clean dry and intact, sensation present throughout left lower extremity, plantar and dorsiflexion against resistance in left foot, DP pulse in left foot doppleable   Skin:     General: Skin is warm and dry.   Neurological:      General: No focal deficit present.      Mental Status: She is alert.   Psychiatric:         Mood and Affect: Mood normal.         Last Recorded Vitals  Blood pressure 109/75, pulse 54, temperature 36.7 °C (98.1 °F), temperature source Temporal, resp. rate 16, height 1.524 m (5'), weight 73 kg (160 lb 15 oz), SpO2 93%.  Intake/Output last 3 Shifts:  I/O last 3 completed shifts:  In: 1751.7 (24 mL/kg) [I.V.:901.7 (12.4 mL/kg); IV Piggyback:850]  Out: 380 (5.2 mL/kg) [Urine:350 (0.1 mL/kg/hr); Blood:30]  Weight: 73 kg     Relevant Results  Results for orders placed or performed during the hospital encounter of 12/29/24 (from the past 24 hours)   Urinalysis with Reflex Microscopic   Result Value Ref Range    Color, Urine Yellow Light-Yellow, Yellow, Dark-Yellow    Appearance,  Urine Turbid (N) Clear    Specific Gravity, Urine 1.028 1.005 - 1.035    pH, Urine 6.0 5.0, 5.5, 6.0, 6.5, 7.0, 7.5, 8.0    Protein, Urine 50 (1+) (A) NEGATIVE, 10 (TRACE), 20 (TRACE) mg/dL    Glucose, Urine OVER (4+) (A) Normal mg/dL    Blood, Urine 0.03 (TRACE) (A) NEGATIVE    Ketones, Urine 20 (1+) (A) NEGATIVE mg/dL    Bilirubin, Urine NEGATIVE NEGATIVE    Urobilinogen, Urine Normal Normal mg/dL    Nitrite, Urine NEGATIVE NEGATIVE    Leukocyte Esterase, Urine 500 Jem/µL (A) NEGATIVE   Microscopic Only, Urine   Result Value Ref Range    WBC, Urine >50 (A) 1-5, NONE /HPF    WBC Clumps, Urine OCCASIONAL Reference range not established. /HPF    RBC, Urine >20 (A) NONE, 1-2, 3-5 /HPF    Squamous Epithelial Cells, Urine 1-9 (SPARSE) Reference range not established. /HPF    Bacteria, Urine 4+ (A) NONE SEEN /HPF    Mucus, Urine FEW Reference range not established. /LPF   Urinalysis with Reflex Culture and Microscopic   Result Value Ref Range    Color, Urine Yellow Light-Yellow, Yellow, Dark-Yellow    Appearance, Urine Turbid (N) Clear    Specific Gravity, Urine 1.028 1.005 - 1.035    pH, Urine 6.0 5.0, 5.5, 6.0, 6.5, 7.0, 7.5, 8.0    Protein, Urine 50 (1+) (A) NEGATIVE, 10 (TRACE), 20 (TRACE) mg/dL    Glucose, Urine OVER (4+) (A) Normal mg/dL    Blood, Urine 0.06 (1+) (A) NEGATIVE    Ketones, Urine 20 (1+) (A) NEGATIVE mg/dL    Bilirubin, Urine NEGATIVE NEGATIVE    Urobilinogen, Urine Normal Normal mg/dL    Nitrite, Urine NEGATIVE NEGATIVE    Leukocyte Esterase, Urine 500 Jme/µL (A) NEGATIVE   Extra Urine Gray Tube   Result Value Ref Range    Extra Tube Hold for add-ons.    Microscopic Only, Urine   Result Value Ref Range    WBC, Urine >50 (A) 1-5, NONE /HPF    WBC Clumps, Urine FEW Reference range not established. /HPF    RBC, Urine >20 (A) NONE, 1-2, 3-5 /HPF    Squamous Epithelial Cells, Urine 1-9 (SPARSE) Reference range not established. /HPF    Bacteria, Urine 4+ (A) NONE SEEN /HPF   POCT GLUCOSE   Result Value Ref  Range    POCT Glucose 272 (H) 74 - 99 mg/dL   POCT GLUCOSE   Result Value Ref Range    POCT Glucose 229 (H) 74 - 99 mg/dL   POCT GLUCOSE   Result Value Ref Range    POCT Glucose 283 (H) 74 - 99 mg/dL   CBC   Result Value Ref Range    WBC 6.8 4.4 - 11.3 x10*3/uL    nRBC 0.0 0.0 - 0.0 /100 WBCs    RBC 6.43 (H) 4.00 - 5.20 x10*6/uL    Hemoglobin 16.4 (H) 12.0 - 16.0 g/dL    Hematocrit 56.4 (H) 36.0 - 46.0 %    MCV 88 80 - 100 fL    MCH 25.5 (L) 26.0 - 34.0 pg    MCHC 29.1 (L) 32.0 - 36.0 g/dL    RDW 16.1 (H) 11.5 - 14.5 %    Platelets 104 (L) 150 - 450 x10*3/uL   Basic metabolic panel   Result Value Ref Range    Glucose 249 (H) 74 - 99 mg/dL    Sodium 137 136 - 145 mmol/L    Potassium 4.4 3.5 - 5.3 mmol/L    Chloride 100 98 - 107 mmol/L    Bicarbonate 24 21 - 32 mmol/L    Anion Gap 17 10 - 20 mmol/L    Urea Nitrogen 48 (H) 6 - 23 mg/dL    Creatinine 2.09 (H) 0.50 - 1.05 mg/dL    eGFR 22 (L) >60 mL/min/1.73m*2    Calcium 8.4 (L) 8.6 - 10.3 mg/dL   Magnesium   Result Value Ref Range    Magnesium 2.23 1.60 - 2.40 mg/dL   POCT GLUCOSE   Result Value Ref Range    POCT Glucose 253 (H) 74 - 99 mg/dL     Scheduled medications  brimonidine, 1 drop, Left Eye, BID  ceFAZolin, 2 g, intravenous, q12h  cefTRIAXone, 1 g, intravenous, q24h  dorzolamide-timoloL, 1 drop, Left Eye, BID  furosemide, 40 mg, oral, Daily  insulin lispro, 0-10 Units, subcutaneous, Before meals & nightly  [Held by provider] insulin lispro protamin-lispro, 15 Units, subcutaneous, BID AC  lidocaine, 2 patch, transdermal, Nightly  [Held by provider] rivaroxaban, 10 mg, oral, Daily  rosuvastatin, 5 mg, oral, q PM      Continuous medications     PRN medications  PRN medications: acetaminophen **OR** acetaminophen **OR** acetaminophen, dextrose, dextrose, glucagon, glucagon, melatonin, morphine, morphine, ondansetron, oxygen, polyethylene glycol    Assessment/Plan   Assessment & Plan  Closed fracture of left hip, initial encounter    CKD (chronic kidney disease),  stage IV (Multi)    Fall    Elevated troponin level    Neutrophilic leukocytosis    Postop day 1 of left hemiarthroplasty  Physical and Occupational Therapy are on consult  Weightbearing as tolerated with walker  Patient is on Xarelto which will cover for VTE prophylaxis  Labs reviewed  Multimodal pain regime  Home medications ordered for chronic medical conditions as appropriate  Bowel regime  Encourage incentive spirometry  Discharge planning after seen by therapy  Follow-up with Dr. Vega in 2 weeks as directed       I spent 15 minutes in the professional and overall care of this patient.      Judy Connelly PA-C

## 2024-12-31 NOTE — PROGRESS NOTES
Subjective  Patient had uneventful night does not complain about any chest pain shortness of breath status post open reduction internal fixation of the left hip  Nursing staff was interviewed  Objectives    Last Recorded Vitals  Blood pressure 102/64, pulse 63, temperature 36.7 °C (98.1 °F), temperature source Temporal, resp. rate 16, height 1.524 m (5'), weight 73 kg (160 lb 15 oz), SpO2 96%.    Physical Exam  HENT:      Right Ear: External ear normal.      Left Ear: External ear normal.      Mouth/Throat:      Mouth: Mucous membranes are moist.   Cardiovascular:      Rate and Rhythm: Normal rate and regular rhythm.      Heart sounds: No murmur heard.     No friction rub. No gallop.   Pulmonary:      Effort: No accessory muscle usage or respiratory distress.      Breath sounds: No stridor. No wheezing or rhonchi.   Chest:      Chest wall: No tenderness.   Abdominal:      General: There is no distension.      Palpations: There is no mass.      Tenderness: There is no abdominal tenderness. There is no guarding or rebound.   Musculoskeletal:      Limited range of motion over the left hip  Skin:     Coloration: Skin is not jaundiced or pale.      Findings: No lesion.   Neurological:      General: No focal deficit present.      Mental Status: He is alert, oriented to person, place, and time and easily aroused.      Cranial Nerves: No cranial nerve deficit.      Sensory: No sensory deficit.      Motor: No weakness.        Labs    Admission on 12/29/2024   Component Date Value Ref Range Status    WBC 12/29/2024 16.6 (H)  4.4 - 11.3 x10*3/uL Final    nRBC 12/29/2024 0.0  0.0 - 0.0 /100 WBCs Final    RBC 12/29/2024 5.03  4.00 - 5.20 x10*6/uL Final    Hemoglobin 12/29/2024 13.1  12.0 - 16.0 g/dL Final    Hematocrit 12/29/2024 43.5  36.0 - 46.0 % Final    MCV 12/29/2024 87  80 - 100 fL Final    MCH 12/29/2024 26.0  26.0 - 34.0 pg Final    MCHC 12/29/2024 30.1 (L)  32.0 - 36.0 g/dL Final    RDW 12/29/2024 14.9 (H)  11.5 - 14.5  % Final    Platelets 12/29/2024 280  150 - 450 x10*3/uL Final    Neutrophils % 12/29/2024 89.4  40.0 - 80.0 % Final    Immature Granulocytes %, Automated 12/29/2024 0.5  0.0 - 0.9 % Final    Immature Granulocyte Count (IG) includes promyelocytes, myelocytes and metamyelocytes but does not include bands. Percent differential counts (%) should be interpreted in the context of the absolute cell counts (cells/UL).    Lymphocytes % 12/29/2024 3.5  13.0 - 44.0 % Final    Monocytes % 12/29/2024 6.3  2.0 - 10.0 % Final    Eosinophils % 12/29/2024 0.1  0.0 - 6.0 % Final    Basophils % 12/29/2024 0.2  0.0 - 2.0 % Final    Neutrophils Absolute 12/29/2024 14.81 (H)  1.60 - 5.50 x10*3/uL Final    Percent differential counts (%) should be interpreted in the context of the absolute cell counts (cells/uL).    Immature Granulocytes Absolute, Au* 12/29/2024 0.08  0.00 - 0.50 x10*3/uL Final    Lymphocytes Absolute 12/29/2024 0.58 (L)  0.80 - 3.00 x10*3/uL Final    Monocytes Absolute 12/29/2024 1.04 (H)  0.05 - 0.80 x10*3/uL Final    Eosinophils Absolute 12/29/2024 0.01  0.00 - 0.40 x10*3/uL Final    Basophils Absolute 12/29/2024 0.03  0.00 - 0.10 x10*3/uL Final    Glucose 12/29/2024 188 (H)  74 - 99 mg/dL Final    Sodium 12/29/2024 140  136 - 145 mmol/L Final    Potassium 12/29/2024 3.7  3.5 - 5.3 mmol/L Final    Chloride 12/29/2024 100  98 - 107 mmol/L Final    Bicarbonate 12/29/2024 26  21 - 32 mmol/L Final    Anion Gap 12/29/2024 18  10 - 20 mmol/L Final    Urea Nitrogen 12/29/2024 30 (H)  6 - 23 mg/dL Final    Creatinine 12/29/2024 1.35 (H)  0.50 - 1.05 mg/dL Final    eGFR 12/29/2024 37 (L)  >60 mL/min/1.73m*2 Final    Calculations of estimated GFR are performed using the 2021 CKD-EPI Study Refit equation without the race variable for the IDMS-Traceable creatinine methods.  https://jasn.asnjournals.org/content/early/2021/09/22/ASN.0167008346    Calcium 12/29/2024 9.8  8.6 - 10.3 mg/dL Final    Albumin 12/29/2024 4.3  3.4 - 5.0  g/dL Final    Alkaline Phosphatase 12/29/2024 96  33 - 136 U/L Final    Total Protein 12/29/2024 7.7  6.4 - 8.2 g/dL Final    AST 12/29/2024 23  9 - 39 U/L Final    Bilirubin, Total 12/29/2024 1.4 (H)  0.0 - 1.2 mg/dL Final    ALT 12/29/2024 14  7 - 45 U/L Final    Patients treated with Sulfasalazine may generate falsely decreased results for ALT.    Protime 12/29/2024 20.0 (H)  9.8 - 12.8 seconds Final    INR 12/29/2024 1.8 (H)  0.9 - 1.1 Final    Troponin I, High Sensitivity 12/29/2024 131 (HH)  0 - 13 ng/L Final    Ventricular Rate 12/29/2024 101  BPM Final    QRS Duration 12/29/2024 96  ms Final    QT Interval 12/29/2024 372  ms Final    QTC Calculation(Bazett) 12/29/2024 482  ms Final    R Axis 12/29/2024 83  degrees Final    T Axis 12/29/2024 90  degrees Final    QRS Count 12/29/2024 17  beats Final    Q Onset 12/29/2024 219  ms Final    T Offset 12/29/2024 405  ms Final    QTC Fredericia 12/29/2024 442  ms Final    Color, Urine 12/30/2024 Yellow  Light-Yellow, Yellow, Dark-Yellow Final    Appearance, Urine 12/30/2024 Turbid (N)  Clear Final    Specific Gravity, Urine 12/30/2024 1.028  1.005 - 1.035 Final    pH, Urine 12/30/2024 6.0  5.0, 5.5, 6.0, 6.5, 7.0, 7.5, 8.0 Final    Protein, Urine 12/30/2024 50 (1+) (A)  NEGATIVE, 10 (TRACE), 20 (TRACE) mg/dL Final    Glucose, Urine 12/30/2024 OVER (4+) (A)  Normal mg/dL Final    Blood, Urine 12/30/2024 0.03 (TRACE) (A)  NEGATIVE Final    Ketones, Urine 12/30/2024 20 (1+) (A)  NEGATIVE mg/dL Final    Bilirubin, Urine 12/30/2024 NEGATIVE  NEGATIVE Final    Urobilinogen, Urine 12/30/2024 Normal  Normal mg/dL Final    Nitrite, Urine 12/30/2024 NEGATIVE  NEGATIVE Final    Leukocyte Esterase, Urine 12/30/2024 500 Jem/µL (A)  NEGATIVE Final    ABO TYPE 12/29/2024 O   Final    Rh TYPE 12/29/2024 POS   Final    ANTIBODY SCREEN 12/29/2024 NEG   Final    WBC 12/30/2024 12.6 (H)  4.4 - 11.3 x10*3/uL Final    nRBC 12/30/2024 0.0  0.0 - 0.0 /100 WBCs Final    RBC 12/30/2024 4.45   4.00 - 5.20 x10*6/uL Final    Hemoglobin 12/30/2024 11.3 (L)  12.0 - 16.0 g/dL Final    Hematocrit 12/30/2024 38.4  36.0 - 46.0 % Final    MCV 12/30/2024 86  80 - 100 fL Final    MCH 12/30/2024 25.4 (L)  26.0 - 34.0 pg Final    MCHC 12/30/2024 29.4 (L)  32.0 - 36.0 g/dL Final    RDW 12/30/2024 15.3 (H)  11.5 - 14.5 % Final    Platelets 12/30/2024 258  150 - 450 x10*3/uL Final    Glucose 12/30/2024 206 (H)  74 - 99 mg/dL Final    Sodium 12/30/2024 141  136 - 145 mmol/L Final    Potassium 12/30/2024 4.7  3.5 - 5.3 mmol/L Final    Chloride 12/30/2024 102  98 - 107 mmol/L Final    Bicarbonate 12/30/2024 25  21 - 32 mmol/L Final    Anion Gap 12/30/2024 19  10 - 20 mmol/L Final    Urea Nitrogen 12/30/2024 33 (H)  6 - 23 mg/dL Final    Creatinine 12/30/2024 1.36 (H)  0.50 - 1.05 mg/dL Final    eGFR 12/30/2024 36 (L)  >60 mL/min/1.73m*2 Final    Calculations of estimated GFR are performed using the 2021 CKD-EPI Study Refit equation without the race variable for the IDMS-Traceable creatinine methods.  https://jasn.asnjournals.org/content/early/2021/09/22/ASN.2032642139    Calcium 12/30/2024 9.3  8.6 - 10.3 mg/dL Final    POCT Glucose 12/29/2024 230 (H)  74 - 99 mg/dL Final    Magnesium 12/30/2024 2.32  1.60 - 2.40 mg/dL Final    Phosphorus 12/30/2024 3.8  2.5 - 4.9 mg/dL Final    The performance characteristics of phosphorus testing in heparinized plasma have been validated by the individual  laboratory site where testing is performed. Testing on heparinized plasma is not approved by the FDA; however, such approval is not necessary.    Troponin I, High Sensitivity 12/29/2024 153 (HH)  0 - 13 ng/L Final    Previous result verified on 12/29/2024 2026 on specimen/case [id number] called with component [facility] for procedure Troponin I, High Sensitivity with value 131 ng/L.    POCT Glucose 12/29/2024 262 (H)  74 - 99 mg/dL Final    POCT Glucose 12/29/2024 210 (H)  74 - 99 mg/dL Final    Troponin I, High Sensitivity 12/30/2024  148 (HH)  0 - 13 ng/L Final    Previous result verified on 12/29/2024 2026 on specimen/case [id number] called with component [facility] for procedure Troponin I, High Sensitivity with value 131 ng/L.    POCT Glucose 12/30/2024 194 (H)  74 - 99 mg/dL Final    BSA 12/31/2024 1.76  m2 In process    Color, Urine 12/30/2024 Yellow  Light-Yellow, Yellow, Dark-Yellow Final    Appearance, Urine 12/30/2024 Turbid (N)  Clear Final    Specific Gravity, Urine 12/30/2024 1.028  1.005 - 1.035 Final    pH, Urine 12/30/2024 6.0  5.0, 5.5, 6.0, 6.5, 7.0, 7.5, 8.0 Final    Protein, Urine 12/30/2024 50 (1+) (A)  NEGATIVE, 10 (TRACE), 20 (TRACE) mg/dL Final    Glucose, Urine 12/30/2024 OVER (4+) (A)  Normal mg/dL Final    Blood, Urine 12/30/2024 0.06 (1+) (A)  NEGATIVE Final    Ketones, Urine 12/30/2024 20 (1+) (A)  NEGATIVE mg/dL Final    Bilirubin, Urine 12/30/2024 NEGATIVE  NEGATIVE Final    Urobilinogen, Urine 12/30/2024 Normal  Normal mg/dL Final    Nitrite, Urine 12/30/2024 NEGATIVE  NEGATIVE Final    Leukocyte Esterase, Urine 12/30/2024 500 Jem/µL (A)  NEGATIVE Final    Extra Tube 12/30/2024 Hold for add-ons.   Final    Auto resulted.    WBC, Urine 12/30/2024 >50 (A)  1-5, NONE /HPF Final    WBC Clumps, Urine 12/30/2024 OCCASIONAL  Reference range not established. /HPF Final    RBC, Urine 12/30/2024 >20 (A)  NONE, 1-2, 3-5 /HPF Final    Squamous Epithelial Cells, Urine 12/30/2024 1-9 (SPARSE)  Reference range not established. /HPF Final    Bacteria, Urine 12/30/2024 4+ (A)  NONE SEEN /HPF Final    Mucus, Urine 12/30/2024 FEW  Reference range not established. /LPF Final    WBC, Urine 12/30/2024 >50 (A)  1-5, NONE /HPF Final    WBC Clumps, Urine 12/30/2024 FEW  Reference range not established. /HPF Final    RBC, Urine 12/30/2024 >20 (A)  NONE, 1-2, 3-5 /HPF Final    Squamous Epithelial Cells, Urine 12/30/2024 1-9 (SPARSE)  Reference range not established. /HPF Final    Bacteria, Urine 12/30/2024 4+ (A)  NONE SEEN /HPF Final     Urine Culture 12/30/2024 >=100,000 CFU/mL Enteric bacilli (A)   Preliminary    POCT Glucose 12/30/2024 272 (H)  74 - 99 mg/dL Final    POCT Glucose 12/30/2024 229 (H)  74 - 99 mg/dL Final    WBC 12/31/2024 6.8  4.4 - 11.3 x10*3/uL Final    nRBC 12/31/2024 0.0  0.0 - 0.0 /100 WBCs Final    RBC 12/31/2024 6.43 (H)  4.00 - 5.20 x10*6/uL Final    Hemoglobin 12/31/2024 16.4 (H)  12.0 - 16.0 g/dL Final    Hematocrit 12/31/2024 56.4 (H)  36.0 - 46.0 % Final    MCV 12/31/2024 88  80 - 100 fL Final    MCH 12/31/2024 25.5 (L)  26.0 - 34.0 pg Final    MCHC 12/31/2024 29.1 (L)  32.0 - 36.0 g/dL Final    RDW 12/31/2024 16.1 (H)  11.5 - 14.5 % Final    Platelets 12/31/2024 104 (L)  150 - 450 x10*3/uL Final    Glucose 12/31/2024 249 (H)  74 - 99 mg/dL Final    Sodium 12/31/2024 137  136 - 145 mmol/L Final    Potassium 12/31/2024 4.4  3.5 - 5.3 mmol/L Final    Chloride 12/31/2024 100  98 - 107 mmol/L Final    Bicarbonate 12/31/2024 24  21 - 32 mmol/L Final    Anion Gap 12/31/2024 17  10 - 20 mmol/L Final    Urea Nitrogen 12/31/2024 48 (H)  6 - 23 mg/dL Final    Creatinine 12/31/2024 2.09 (H)  0.50 - 1.05 mg/dL Final    eGFR 12/31/2024 22 (L)  >60 mL/min/1.73m*2 Final    Calculations of estimated GFR are performed using the 2021 CKD-EPI Study Refit equation without the race variable for the IDMS-Traceable creatinine methods.  https://jasn.asnjournals.org/content/early/2021/09/22/ASN.2845659112    Calcium 12/31/2024 8.4 (L)  8.6 - 10.3 mg/dL Final    Magnesium 12/31/2024 2.23  1.60 - 2.40 mg/dL Final    POCT Glucose 12/30/2024 283 (H)  74 - 99 mg/dL Final    RN NOTIFIED    POCT Glucose 12/31/2024 253 (H)  74 - 99 mg/dL Final    RN NOTIFIED    POCT Glucose 12/31/2024 303 (H)  74 - 99 mg/dL Final         Imaging     ECG 12 lead  Atrial fibrillation with rapid ventricular response  Nonspecific ST abnormality  Abnormal ECG  When compared with ECG of 07-MAY-2021 19:16,  Significant changes have occurred  Confirmed by Angel Mcbride  ([id number]) on 12/30/2024 8:59:03 PM  XR pelvis 1-2 views  Narrative: Interpreted By:  Brian Lopez,   STUDY:  XR PELVIS 1-2 VIEWS      INDICATION:  Signs/Symptoms:sp surgery.      COMPARISON:  Earlier the same day      ACCESSION NUMBER(S):  [id number]      ORDERING CLINICIAN:  STACY CHAN      FINDINGS:  Interval left hip hemiarthroplasty without fracture or malalignment.  Postsurgical soft tissue gas noted.      Impression: Satisfactory appearance status post left hip arthroplasty.      Signed by: Brian Lopez 12/30/2024 5:44 PM  Dictation workstation:   [id number]       Patient Active Problem List   Diagnosis    Abnormal weight gain    Abnormality of gait and mobility    Anemia    Basal cell carcinoma (BCC) of face    Bilateral edema of lower extremity    CAD (coronary artery disease)    Carcinoma in situ of skin of other parts of face    Cardiomyopathy    Choroidal detachment, hemorrhagic, right eye    Chronic diastolic heart failure    Urinary retention    Status post glaucoma surgery    CKD (chronic kidney disease), stage IV (Multi)    Skin changes due to chronic exposure to nonionizing radiation, unspecified    Atrial fibrillation (Multi)    Sinus bradycardia    Prolapse of vaginal wall    Primary open angle glaucoma of both eyes, mild stage    Pressure injury of deep tissue of heel    Postmenopausal vaginal bleeding    PAD (peripheral artery disease) (CMS-HCC)    Osteoarthritis of lumbar spine    Obesity, unspecified    Neoplasm of uncertain behavior of skin    Multiple fractures    Low vitamin D level    Hypokalemia    History of trabeculectomy    History of non-ST elevation myocardial infarction (NSTEMI)    Hip fracture, right    Fatigue    Essential hypertension, benign    Dyslipidemia    Diabetes mellitus (Multi)    Dependent edema    Critical lower limb ischemia (Multi)    Closed fracture of lateral malleolus    Presence of right artificial hip joint    Cardiac arrhythmia    Slow heart rate    Closed  fracture of left hip, initial encounter    Fall    Elevated troponin level    Neutrophilic leukocytosis         Assessment/Plan   Assessment & Plan  Closed fracture of left hip, initial encounter      Fall    Elevated troponin level    CKD (chronic kidney disease), stage IV (Multi)  Monitor kidney function  Neutrophilic leukocytosis  Left hip fracture status post open reduction internal fixation patient is doing better  ALESIA start patient on IV hydration  History of atrial fibrillation and coronary artery disease currently stable cardiology note was reviewed  History of diabetes keep patient on sliding scale           I spent 30 minutes in the professional and overall care of this patient.      ALMAS Martínez MD

## 2024-12-31 NOTE — PROGRESS NOTES
Physical Therapy    Physical Therapy Evaluation and Treatment    Patient Name: Dena Short  MRN: [id number]  Today's Date: 12/31/2024   Time Calculation  Start Time: 1214  Stop Time: 1240  Time Calculation (min): 26 min  4114/4114-A    Assessment/Plan   PT Assessment  PT Assessment Results: Decreased strength, Decreased range of motion, Decreased endurance, Impaired balance, Decreased mobility, Decreased safety awareness, Pain, Impaired judgement, Decreased cognition, Orthopedic restrictions  Rehab Prognosis: Good  Barriers to Discharge Home: Cognition needs, Physical needs  Cognition Needs: 24hr supervision for safety awareness needed  Physical Needs: In-home setup navigation limited by function/safety, Ambulating household distances limited by function/safety, 24hr mobility assistance needed, High falls risk due to function or environment  Evaluation/Treatment Tolerance:  (limited by dizziness)  Medical Staff Made Aware: Yes  End of Session Communication: Bedside nurse  Assessment Comment: Pt presents today below baseline level of function and requires continued PT during hospital stay. Pt requires 24 hour physical assist for all mobility to prevent falls. Pt is unsafe to navigate home environment at this time with available support and assist. Pt requires PT at a moderate intensity level at discharge to maximize functional mobility and safety.    End of Session Patient Position: Up in chair, Alarm on  IP OR SWING BED PT PLAN  Inpatient or Swing Bed: Inpatient  PT Plan  Treatment/Interventions: Bed mobility, Transfer training, Gait training, Balance training, Neuromuscular re-education, Strengthening, Endurance training, Range of motion, Therapeutic exercise, Therapeutic activity, Home exercise program  PT Plan: Ongoing PT  PT Frequency: Daily  PT Discharge Recommendations: Moderate intensity level of continued care  Equipment Recommended upon Discharge: Wheeled walker  PT Recommended Transfer Status: Assist  x2  PT - OK to Discharge: Yes (To next level of care when cleared by medical team   )    Subjective       General Visit Information:  General  Reason for Referral: recent surgery  Referred By: Dr. Vega  Past Medical History Relevant to Rehab: To hospital following fall. Pt found to have left hip fracture. Pt underwent left hip hemiarthroplasty 12/20/24. PMH: bradycardia, CAD, cardiomyopathy, CKD, DM, PAD, NSTEMI, cardiac cath, cardioversion, right hip hemiarthropasty  Family/Caregiver Present: Yes (friend)  Co-Treatment: OT  Co-Treatment Reason: for safety  Prior to Session Communication: Bedside nurse  Patient Position Received: Alarm on, Bed, 2 rail up  Preferred Learning Style: verbal, kinesthetic  General Comment: Pt agreeable to PT, nursing cleared for treatment.    Home Living:  Home Living  Type of Home: Apartment  Lives With: Alone  Home Adaptive Equipment: Cane, Walker rolling or standard (FWW)  Home Layout: One level  Home Access: Elevator (on third floor)  Bathroom Shower/Tub: Tub/shower unit  Bathroom Equipment: Grab bars in shower, Shower chair with back    Prior Level of Function:  Prior Function Per Pt/Caregiver Report  ADL Assistance: Independent  Homemaking Assistance:  (family assists)  Ambulatory Assistance: Independent (use of FWW at night and walker or cane during day)  Prior Function Comments: friend drives and shops    Precautions:  Precautions  LE Weight Bearing Status: Weight Bearing as Tolerated (left LE)  Medical Precautions: Fall precautions  Post-Surgical Precautions:  (left posterior hip precautions)    Vital Signs:  Vital Signs  Heart Rate: 63  SpO2: 96 %  BP: 102/64 (seated in chair folloiwng transfer)  Objective     Pain:  Pain Assessment  Pain Assessment: 0-10  0-10 (Numeric) Pain Score:  (mild)  Pain Type: Surgical pain  Pain Location: Hip  Pain Orientation: Left  Pain Interventions: Ambulation/increased activity, Repositioned (pt medicated at start of  eval)    Cognition:  Cognition  Overall Cognitive Status:  (appears mildly confused)  Orientation Level: Disoriented to time  Sustained Attention: Impaired (requires frequent resdirection to stay on task)  Safety/Judgement:  (impaired)  Insight: Mild  Impulsive: Mildly    General Assessments:      Activity Tolerance  Endurance: Tolerates 10 - 20 min exercise with multiple rests (limited by dizziness)  Sensation  Sensation Comment: denies numbness and tingling              Static Sitting Balance  Static Sitting-Comment/Number of Minutes: good  Dynamic Sitting Balance  Dynamic Sitting-Comments: good  Static Standing Balance  Static Standing-Comment/Number of Minutes: fair  Dynamic Standing Balance  Dynamic Standing-Comments: poor    Functional Assessments:     Bed Mobility  Bed Mobility: Yes  Bed Mobility 1  Bed Mobility 1: Supine to sitting  Level of Assistance 1: Maximum assistance  Bed Mobility Comments 1: x 2  Transfers  Transfer: Yes  Transfer 1  Transfer From 1: Sit to  Transfer to 1: Stand  Transfer Device 1: Walker  Transfer Level of Assistance 1: Maximum assistance  Transfers 2  Transfer From 2: Stand to  Transfer to 2: Sit  Transfer Device 2: Walker  Transfer Level of Assistance 2: Maximum assistance  Trials/Comments 2: pt reported dizziness once seated in chair. Pt with decreased responsiveness. Elevated patient's legs in recliner and RN came to bedside to assess. Vitals take and symptoms and pt responsiveness improved once relcined in chair.  Ambulation/Gait Training  Ambulation/Gait Training Performed: Yes  Ambulation/Gait Training 1  Device 1: Rolling walker  Gait Support Devices: Gait belt  Assistance 1: Maximum assistance (x 2)  Quality of Gait 1: Decreased step length, Inconsistent stride length, Forward flexed posture  Comments/Distance (ft) 1: 2 feet bed to chair, pt requires physical assist to advance BLE. Pt is very anxious and fearful        Treatment    Pt educated on posterior hip precautions.  Verbal and tactile cues to facilitate abduction/adduction of BLE's and UE placement to transition supine<>sitting EOB. Cues for placing feet flat on floor at EOB and squaring hips to maximize safety. Cues for safe hand placement with use of FWW for sit<>stand. Instruction provided in step to gait pattern with cues given for sequencing with FWW. Cues given for turning strategy to avoid pivoting on left LE.   Extremity/Trunk Assessments:        RLE   RLE : Exceptions to WFL  Strength RLE  RLE Overall Strength: Greater than or equal to 3/5 as evidenced by functional mobility  LLE   LLE : Exceptions to WFL  Strength LLE  LLE Overall Strength:  (grossly 3-/5 throughout with functional movement)    Outcome Measures:     [facility] Basic Mobility  Turning from your back to your side while in a flat bed without using bedrails: A lot  Moving from lying on your back to sitting on the side of a flat bed without using bedrails: A lot  Moving to and from bed to chair (including a wheelchair): A lot  Standing up from a chair using your arms (e.g. wheelchair or bedside chair): A lot  To walk in hospital room: A lot  Climbing 3-5 steps with railing: Total  Basic Mobility - Total Score: 11                               Goals:  Encounter Problems       Encounter Problems (Active)       PT Problem       Pt will perform bed mobility with mod A.  (Progressing)       Start:  12/31/24    Expected End:  01/14/25            Pt will complete sit <> stand and bed <> chair transfers with mod A.    (Progressing)       Start:  12/31/24    Expected End:  01/14/25            Pt will ambulate 20 feet mod A using FWW with no significant gait deviations.   (Progressing)       Start:  12/31/24    Expected End:  01/14/25            Pt will progress to completing 3 x 20 supine/seated exercises in order to increase strength and improve gait mechanics.   (Progressing)       Start:  12/31/24    Expected End:  01/14/25                 Education  Documentation  Precautions, taught by Eleni Flor, PT at 12/31/2024  1:54 PM.  Learner: Patient  Readiness: Acceptance  Method: Explanation  Response: Verbalizes Understanding, Needs Reinforcement    Body Mechanics, taught by Eleni Flor, PT at 12/31/2024  1:54 PM.  Learner: Patient  Readiness: Acceptance  Method: Explanation  Response: Verbalizes Understanding, Needs Reinforcement    Mobility Training, taught by Eleni Flor, PT at 12/31/2024  1:54 PM.  Learner: Patient  Readiness: Acceptance  Method: Explanation  Response: Verbalizes Understanding, Needs Reinforcement    Education Comments  No comments found.

## 2024-12-31 NOTE — CARE PLAN
The patient's goals for the shift include      The clinical goals for the shift include Patient will have pain managed at a tolerable level this shift. Patient will remain free from falls. Patient will have increased mobility/ROM this shift.      Problem: Skin  Goal: Prevent/manage excess moisture  Flowsheets (Taken 12/31/2024 1340)  Prevent/manage excess moisture:   Cleanse incontinence/protect with barrier cream   Moisturize dry skin   Use wicking fabric (obtain order)

## 2025-01-01 LAB
ANION GAP SERPL CALC-SCNC: 17 MMOL/L (ref 10–20)
BACTERIA UR CULT: ABNORMAL
BUN SERPL-MCNC: 59 MG/DL (ref 6–23)
CALCIUM SERPL-MCNC: 8.3 MG/DL (ref 8.6–10.3)
CHLORIDE SERPL-SCNC: 99 MMOL/L (ref 98–107)
CO2 SERPL-SCNC: 23 MMOL/L (ref 21–32)
CREAT SERPL-MCNC: 2.36 MG/DL (ref 0.5–1.05)
EGFRCR SERPLBLD CKD-EPI 2021: 19 ML/MIN/1.73M*2
ERYTHROCYTE [DISTWIDTH] IN BLOOD BY AUTOMATED COUNT: 15.5 % (ref 11.5–14.5)
FERRITIN SERPL-MCNC: 60 NG/ML (ref 8–150)
GLUCOSE BLD MANUAL STRIP-MCNC: 139 MG/DL (ref 74–99)
GLUCOSE BLD MANUAL STRIP-MCNC: 165 MG/DL (ref 74–99)
GLUCOSE BLD MANUAL STRIP-MCNC: 188 MG/DL (ref 74–99)
GLUCOSE BLD MANUAL STRIP-MCNC: 195 MG/DL (ref 74–99)
GLUCOSE SERPL-MCNC: 181 MG/DL (ref 74–99)
HCT VFR BLD AUTO: 34.9 % (ref 36–46)
HGB BLD-MCNC: 10.5 G/DL (ref 12–16)
IRON SATN MFR SERPL: 3 % (ref 25–45)
IRON SERPL-MCNC: 10 UG/DL (ref 35–150)
MAGNESIUM SERPL-MCNC: 2.42 MG/DL (ref 1.6–2.4)
MCH RBC QN AUTO: 25.9 PG (ref 26–34)
MCHC RBC AUTO-ENTMCNC: 30.1 G/DL (ref 32–36)
MCV RBC AUTO: 86 FL (ref 80–100)
NRBC BLD-RTO: 0 /100 WBCS (ref 0–0)
PLATELET # BLD AUTO: 222 X10*3/UL (ref 150–450)
POTASSIUM SERPL-SCNC: 4.5 MMOL/L (ref 3.5–5.3)
RBC # BLD AUTO: 4.06 X10*6/UL (ref 4–5.2)
SODIUM SERPL-SCNC: 134 MMOL/L (ref 136–145)
TIBC SERPL-MCNC: 341 UG/DL (ref 240–445)
UIBC SERPL-MCNC: 331 UG/DL (ref 110–370)
WBC # BLD AUTO: 11.6 X10*3/UL (ref 4.4–11.3)

## 2025-01-01 PROCEDURE — 2500000002 HC RX 250 W HCPCS SELF ADMINISTERED DRUGS (ALT 637 FOR MEDICARE OP, ALT 636 FOR OP/ED): Performed by: NURSE PRACTITIONER

## 2025-01-01 PROCEDURE — 82607 VITAMIN B-12: CPT | Mod: STJLAB | Performed by: INTERNAL MEDICINE

## 2025-01-01 PROCEDURE — 97530 THERAPEUTIC ACTIVITIES: CPT | Mod: GO,CO

## 2025-01-01 PROCEDURE — 82306 VITAMIN D 25 HYDROXY: CPT | Mod: STJLAB | Performed by: INTERNAL MEDICINE

## 2025-01-01 PROCEDURE — 82728 ASSAY OF FERRITIN: CPT | Performed by: INTERNAL MEDICINE

## 2025-01-01 PROCEDURE — 83540 ASSAY OF IRON: CPT | Performed by: INTERNAL MEDICINE

## 2025-01-01 PROCEDURE — 2500000004 HC RX 250 GENERAL PHARMACY W/ HCPCS (ALT 636 FOR OP/ED): Performed by: NURSE PRACTITIONER

## 2025-01-01 PROCEDURE — 85027 COMPLETE CBC AUTOMATED: CPT | Performed by: NURSE PRACTITIONER

## 2025-01-01 PROCEDURE — 83735 ASSAY OF MAGNESIUM: CPT | Performed by: NURSE PRACTITIONER

## 2025-01-01 PROCEDURE — 36415 COLL VENOUS BLD VENIPUNCTURE: CPT | Performed by: INTERNAL MEDICINE

## 2025-01-01 PROCEDURE — 82746 ASSAY OF FOLIC ACID SERUM: CPT | Mod: STJLAB | Performed by: INTERNAL MEDICINE

## 2025-01-01 PROCEDURE — 2500000001 HC RX 250 WO HCPCS SELF ADMINISTERED DRUGS (ALT 637 FOR MEDICARE OP): Performed by: PHYSICIAN ASSISTANT

## 2025-01-01 PROCEDURE — 36415 COLL VENOUS BLD VENIPUNCTURE: CPT | Performed by: NURSE PRACTITIONER

## 2025-01-01 PROCEDURE — 51701 INSERT BLADDER CATHETER: CPT

## 2025-01-01 PROCEDURE — 97530 THERAPEUTIC ACTIVITIES: CPT | Mod: GP,CQ

## 2025-01-01 PROCEDURE — 82435 ASSAY OF BLOOD CHLORIDE: CPT | Performed by: NURSE PRACTITIONER

## 2025-01-01 PROCEDURE — 2500000005 HC RX 250 GENERAL PHARMACY W/O HCPCS: Performed by: NURSE PRACTITIONER

## 2025-01-01 PROCEDURE — 82947 ASSAY GLUCOSE BLOOD QUANT: CPT

## 2025-01-01 PROCEDURE — 83970 ASSAY OF PARATHORMONE: CPT | Mod: STJLAB | Performed by: INTERNAL MEDICINE

## 2025-01-01 PROCEDURE — 1200000002 HC GENERAL ROOM WITH TELEMETRY DAILY

## 2025-01-01 RX ORDER — FERROUS SULFATE 325(65) MG
65 TABLET ORAL 2 TIMES DAILY
Status: DISCONTINUED | OUTPATIENT
Start: 2025-01-04 | End: 2025-01-07 | Stop reason: HOSPADM

## 2025-01-01 RX ORDER — SODIUM CHLORIDE, SODIUM LACTATE, POTASSIUM CHLORIDE, CALCIUM CHLORIDE 600; 310; 30; 20 MG/100ML; MG/100ML; MG/100ML; MG/100ML
125 INJECTION, SOLUTION INTRAVENOUS CONTINUOUS
Status: ACTIVE | OUTPATIENT
Start: 2025-01-01 | End: 2025-01-02

## 2025-01-01 RX ADMIN — SODIUM CHLORIDE, POTASSIUM CHLORIDE, SODIUM LACTATE AND CALCIUM CHLORIDE 125 ML/HR: 600; 310; 30; 20 INJECTION, SOLUTION INTRAVENOUS at 20:16

## 2025-01-01 RX ADMIN — SODIUM CHLORIDE, POTASSIUM CHLORIDE, SODIUM LACTATE AND CALCIUM CHLORIDE 125 ML/HR: 600; 310; 30; 20 INJECTION, SOLUTION INTRAVENOUS at 12:32

## 2025-01-01 RX ADMIN — IRON SUCROSE 300 MG: 20 INJECTION, SOLUTION INTRAVENOUS at 22:00

## 2025-01-01 RX ADMIN — OXYCODONE HYDROCHLORIDE 5 MG: 5 TABLET ORAL at 17:47

## 2025-01-01 RX ADMIN — CEFTRIAXONE SODIUM 1 G: 1 INJECTION, SOLUTION INTRAVENOUS at 21:12

## 2025-01-01 RX ADMIN — INSULIN LISPRO 15 UNITS: 100 INJECTION, SUSPENSION SUBCUTANEOUS at 17:32

## 2025-01-01 RX ADMIN — INSULIN LISPRO 15 UNITS: 100 INJECTION, SUSPENSION SUBCUTANEOUS at 08:53

## 2025-01-01 RX ADMIN — INSULIN LISPRO 2 UNITS: 100 INJECTION, SOLUTION INTRAVENOUS; SUBCUTANEOUS at 08:52

## 2025-01-01 RX ADMIN — DORZOLAMIDE HYDROCHLORIDE AND TIMOLOL MALEATE 1 DROP: 20; 5 SOLUTION/ DROPS OPHTHALMIC at 22:00

## 2025-01-01 RX ADMIN — OXYCODONE HYDROCHLORIDE 5 MG: 5 TABLET ORAL at 08:59

## 2025-01-01 RX ADMIN — ROSUVASTATIN CALCIUM 5 MG: 5 TABLET, FILM COATED ORAL at 21:11

## 2025-01-01 RX ADMIN — BRIMONIDINE TARTRATE 1 DROP: 2 SOLUTION/ DROPS OPHTHALMIC at 21:12

## 2025-01-01 RX ADMIN — DORZOLAMIDE HYDROCHLORIDE AND TIMOLOL MALEATE 1 DROP: 20; 5 SOLUTION/ DROPS OPHTHALMIC at 08:50

## 2025-01-01 RX ADMIN — LIDOCAINE 4% 2 PATCH: 40 PATCH TOPICAL at 21:11

## 2025-01-01 RX ADMIN — INSULIN LISPRO 2 UNITS: 100 INJECTION, SOLUTION INTRAVENOUS; SUBCUTANEOUS at 12:16

## 2025-01-01 RX ADMIN — RIVAROXABAN 10 MG: 10 TABLET, FILM COATED ORAL at 21:11

## 2025-01-01 RX ADMIN — BRIMONIDINE TARTRATE 1 DROP: 2 SOLUTION/ DROPS OPHTHALMIC at 08:50

## 2025-01-01 ASSESSMENT — PAIN DESCRIPTION - ORIENTATION: ORIENTATION: LEFT

## 2025-01-01 ASSESSMENT — PAIN - FUNCTIONAL ASSESSMENT
PAIN_FUNCTIONAL_ASSESSMENT: 0-10

## 2025-01-01 ASSESSMENT — PAIN SCALES - GENERAL
PAINLEVEL_OUTOF10: 0 - NO PAIN
PAINLEVEL_OUTOF10: 4
PAINLEVEL_OUTOF10: 7
PAINLEVEL_OUTOF10: 2

## 2025-01-01 ASSESSMENT — COGNITIVE AND FUNCTIONAL STATUS - GENERAL
STANDING UP FROM CHAIR USING ARMS: A LOT
TOILETING: A LOT
MOBILITY SCORE: 11
TURNING FROM BACK TO SIDE WHILE IN FLAT BAD: A LOT
DRESSING REGULAR UPPER BODY CLOTHING: A LITTLE
HELP NEEDED FOR BATHING: TOTAL
MOVING FROM LYING ON BACK TO SITTING ON SIDE OF FLAT BED WITH BEDRAILS: A LOT
DRESSING REGULAR LOWER BODY CLOTHING: TOTAL
TURNING FROM BACK TO SIDE WHILE IN FLAT BAD: A LOT
WALKING IN HOSPITAL ROOM: A LOT
PERSONAL GROOMING: A LITTLE
MOVING FROM LYING ON BACK TO SITTING ON SIDE OF FLAT BED WITH BEDRAILS: A LOT
MOVING TO AND FROM BED TO CHAIR: A LOT
CLIMB 3 TO 5 STEPS WITH RAILING: TOTAL
WALKING IN HOSPITAL ROOM: A LOT
EATING MEALS: A LITTLE
CLIMB 3 TO 5 STEPS WITH RAILING: TOTAL
MOVING TO AND FROM BED TO CHAIR: A LOT
STANDING UP FROM CHAIR USING ARMS: A LOT
MOBILITY SCORE: 11
DAILY ACTIVITIY SCORE: 13

## 2025-01-01 ASSESSMENT — PAIN DESCRIPTION - LOCATION
LOCATION: HIP
LOCATION: HIP

## 2025-01-01 NOTE — CONSULTS
Reason For Consult  ALESIA with worsening creat despite IVF     History Of Present Illness  Dena Short is a 89 y.o. female   PMH; CAD s/p cardiac cath with previous stents, history of NSTEMI, chronic diastolic heart failure [LVEF 55 to 60% in May 2021], paroxysmal atrial fibrillation on Xarelto s/p cardioversion but rate control medications/antiarrhythmics discontinued by patient, history of cardiomyopathy, sinus arrhythmia, PAD s/p stenting right SFA-popliteal artery and angioplasty right PT and peroneal arteries, CKD 4, and IDDM 2  Patient presented to [facility] on 12/29/2024 from home after fall.   Patient has a history of CKD going back to 2018, creatinine has been around 1.7-2 since May 2019.  On initial admission this time around her creatinine was 1.35 and started increasing up to 2.36 so nephrology was consulted.  Nephrology is consulted for ALESIA on CKD.     Past Medical History  She has a past medical history of Basal cell carcinoma, Bradycardia, CAD (coronary artery disease), Cardiomyopathy (04/09/2018), Choroidal detachment, hemorrhagic, right eye, Chronic diastolic heart failure, CKD (chronic kidney disease), stage IV (Multi), Hip fracture, right (02/01/2024), Insulin dependent type 2 diabetes mellitus (Multi), NSTEMI (non-ST elevated myocardial infarction) (Multi), PAD (peripheral artery disease) (CMS-HCC), Paroxysmal atrial fibrillation (Multi), and Sinus arrhythmia.    Surgical History  She has a past surgical history that includes Other surgical history (01/27/2022); Cholecystectomy (09/14/2018); Other surgical history (09/14/2018); Coronary stent placement; Cardiac catheterization; Cardioversion; and Hip fracture surgery (Right).     Social History  She reports that she has never smoked. She has never used smokeless tobacco. She reports that she does not drink alcohol and does not use drugs.    Family History  Family History   Problem Relation Name Age of Onset    Coronary artery disease Father           Allergies  Patient has no known allergies.  Scheduled medications  acetaminophen, 975 mg, oral, q8h  brimonidine, 1 drop, Left Eye, BID  cefTRIAXone, 1 g, intravenous, q24h  cholecalciferol, 5,000 Units, oral, Daily  dorzolamide-timoloL, 1 drop, Left Eye, BID  [START ON 1/4/2025] ferrous sulfate (325 mg ferrous sulfate), 65 mg of iron, oral, BID  [Held by provider] furosemide, 40 mg, oral, Daily  insulin lispro, 0-10 Units, subcutaneous, Before meals & nightly  insulin lispro protamin-lispro, 15 Units, subcutaneous, BID AC  iron sucrose, 300 mg, intravenous, Nightly  lidocaine, 2 patch, transdermal, Nightly  rivaroxaban, 10 mg, oral, Daily  rosuvastatin, 5 mg, oral, q PM      Continuous medications  lactated Ringer's, 125 mL/hr, Last Rate: 125 mL/hr (01/02/25 1900)      PRN medications  PRN medications: dextrose, dextrose, glucagon, glucagon, melatonin, ondansetron, oxyCODONE, polyethylene glycol    Review of Systems  12 systems were reviewed and pertinent findings were addressed in HPI       Physical Exam  GENERAL: Alert, no distress, cooperative  SKIN: Skin color, texture, turgor normal. No rashes or lesions.  EYES: PERRLA, EOMI  HEENT: Head: Normocephalic  Neck: supple and no adenopathy  LUNGS: Lungs clear to auscultation. Good diaphragmatic excursion.  CARDIAC: Normal S1 and S2; no rubs, murmurs, or gallops  ABDOMEN: Abdomen soft, non-tender. BS normal. No masses or organomegaly.  EXTREMITIES: No ulcers, Extremities normal. No deformities, edema, clubbing or skin discoloration.  NEURO: Cranial nerves II-XII intact, no focal deficit.             I&O 24HR    Intake/Output Summary (Last 24 hours) at 1/1/2025 1646  Last data filed at 1/1/2025 0500  Gross per 24 hour   Intake --   Output 600 ml   Net -600 ml       Vitals 24HR  Heart Rate:  [58-90]   Temp:  [36.3 °C (97.3 °F)-36.9 °C (98.4 °F)]   Resp:  [16-18]   BP: ()/(40-81)   SpO2:  [95 %-98 %]     Relevant Results  Results for orders placed or performed  during the hospital encounter of 12/29/24 (from the past 24 hours)   CBC   Result Value Ref Range    WBC 10.9 4.4 - 11.3 x10*3/uL    nRBC 0.0 0.0 - 0.0 /100 WBCs    RBC 3.69 (L) 4.00 - 5.20 x10*6/uL    Hemoglobin 9.6 (L) 12.0 - 16.0 g/dL    Hematocrit 31.9 (L) 36.0 - 46.0 %    MCV 86 80 - 100 fL    MCH 26.0 26.0 - 34.0 pg    MCHC 30.1 (L) 32.0 - 36.0 g/dL    RDW 15.4 (H) 11.5 - 14.5 %    Platelets 237 150 - 450 x10*3/uL   Magnesium   Result Value Ref Range    Magnesium 2.33 1.60 - 2.40 mg/dL   Hepatic Function Panel   Result Value Ref Range    Albumin 2.9 (L) 3.4 - 5.0 g/dL    Bilirubin, Total 0.6 0.0 - 1.2 mg/dL    Bilirubin, Direct 0.1 0.0 - 0.3 mg/dL    Alkaline Phosphatase 53 33 - 136 U/L    ALT <3 (L) 7 - 45 U/L    AST 17 9 - 39 U/L    Total Protein 5.7 (L) 6.4 - 8.2 g/dL   Phosphorus   Result Value Ref Range    Phosphorus 3.4 2.5 - 4.9 mg/dL   Basic Metabolic Panel   Result Value Ref Range    Glucose 99 74 - 99 mg/dL    Sodium 136 136 - 145 mmol/L    Potassium 4.2 3.5 - 5.3 mmol/L    Chloride 99 98 - 107 mmol/L    Bicarbonate 27 21 - 32 mmol/L    Anion Gap 14 10 - 20 mmol/L    Urea Nitrogen 56 (H) 6 - 23 mg/dL    Creatinine 1.94 (H) 0.50 - 1.05 mg/dL    eGFR 24 (L) >60 mL/min/1.73m*2    Calcium 8.4 (L) 8.6 - 10.3 mg/dL   POCT GLUCOSE   Result Value Ref Range    POCT Glucose 109 (H) 74 - 99 mg/dL   POCT GLUCOSE   Result Value Ref Range    POCT Glucose 161 (H) 74 - 99 mg/dL   POCT GLUCOSE   Result Value Ref Range    POCT Glucose 82 74 - 99 mg/dL   Urinalysis with Reflex Culture and Microscopic   Result Value Ref Range    Color, Urine Yellow Light-Yellow, Yellow, Dark-Yellow    Appearance, Urine Clear Clear    Specific Gravity, Urine 1.019 1.005 - 1.035    pH, Urine 5.0 5.0, 5.5, 6.0, 6.5, 7.0, 7.5, 8.0    Protein, Urine 20 (TRACE) NEGATIVE, 10 (TRACE), 20 (TRACE) mg/dL    Glucose, Urine Normal Normal mg/dL    Blood, Urine 0.03 (TRACE) (A) NEGATIVE    Ketones, Urine NEGATIVE NEGATIVE mg/dL    Bilirubin, Urine  NEGATIVE NEGATIVE    Urobilinogen, Urine Normal Normal mg/dL    Nitrite, Urine NEGATIVE NEGATIVE    Leukocyte Esterase, Urine 250 Jem/µL (A) NEGATIVE   Microscopic Only, Urine   Result Value Ref Range    WBC, Urine 21-50 (A) 1-5, NONE /HPF    WBC Clumps, Urine RARE Reference range not established. /HPF    RBC, Urine 3-5 NONE, 1-2, 3-5 /HPF    Mucus, Urine FEW Reference range not established. /LPF   POCT GLUCOSE   Result Value Ref Range    POCT Glucose 78 74 - 99 mg/dL          Assessment/Plan     Assessment & Plan  Closed fracture of left hip, initial encounter    CKD (chronic kidney disease), stage IV (Multi)    Fall    Elevated troponin level    Neutrophilic leukocytosis      // ALESIA on CKD 3b.   - Baseline Cr; 1.3-1.7 back to 2019 with baseline GFR in the 3B range.   - Cr on admission; 1.35   - Offending meds; hold losartan and lasix during ALESIA.   - IV contrast; none.   - Hemodynamics; /84 on 12/30, 84/42 on 1/1.   - Check UA;   - Check urine electrolytes;   - Check UPCR;   - Check renal US;   - Avoid nephrotoxic medications including NSAIDs and IV contrast if possible.   - Dose medication to eGFR.   - Etiology is likely; hemodynamic with fluctuating BP and decreased renal perfusion with possible ATN.   - IVF      // Anemia of CKD   - Hgb at goal.   - Medications; none.     // CKD MBD.   - PTH.   - Ca and phos at goal.   - Medications; none.     // Metabolic acidosis   None.     // HTN   // Hypotension.       // CAD s/p cardiac cath with previous stents, history of NSTEMI,   // chronic diastolic heart failure [LVEF 55 to 60% in May 2021],   // paroxysmal atrial fibrillation on Xarelto s/p cardioversion but rate control medications/antiarrhythmics discontinued by patient,   // PAD s/p stenting right SFA-popliteal artery and angioplasty right PT and peroneal arteries,   // IDDM 2        Alejandro Richards MD

## 2025-01-01 NOTE — PROGRESS NOTES
"Physical Therapy    Physical Therapy    Physical Therapy Treatment    Patient Name: Dena Short  MRN: [id number]  Today's Date: 1/1/2025  Time Calculation  Start Time: 1044  Stop Time: 1115  Time Calculation (min): 31 min     4114/4114-A       01/01/25 1044   PT  Visit   PT Received On 01/01/25   General   Reason for Referral recent surgery   Referred By Dr. Vega   Past Medical History Relevant to Rehab To hospital following fall. Pt found to have left hip fracture. Pt underwent left hip hemiarthroplasty 12/20/24. PMH: bradycardia, CAD, cardiomyopathy, CKD, DM, PAD, NSTEMI, cardiac cath, cardioversion, right hip hemiarthropasty   Prior to Session Communication Bedside nurse   Patient Position Received Alarm on;Bed, 2 rail up   General Comment Pt agreeable to therapy with encouragement, nursing cleared for treatment.   Precautions   LE Weight Bearing Status Weight Bearing as Tolerated  (LLE)   Medical Precautions Fall precautions   Post-Surgical Precautions Left hip precautions   Pain Assessment   Pain Assessment 0-10   0-10 (Numeric) Pain Score   (Pt rated pain ay \"1059\", however, did not appear to be in significant pain throughout session.)   Pain Type Acute pain   Pain Location Hip   Pain Orientation Right   Pain Interventions Repositioned   Cognition   Overall Cognitive Status WFL   Therapeutic Exercise   Therapeutic Exercise Performed Yes   Therapeutic Exercise Activity 1 AP/GS/QS x10-15   Bed Mobility   Bed Mobility Yes   Bed Mobility 1   Bed Mobility 1 Supine to sitting   Level of Assistance 1 Moderate assistance;Moderate verbal cues;+2   Bed Mobility Comments 1 Increased time and effort to complete with mod/max cues while maintaining hip precautions   Transfers   Transfer Yes   Transfer 1   Transfer From 1 Sit to   Transfer to 1 Stand   Technique 1 Sit to stand;Stand to sit   Transfer Device 1 Walker;Gait belt   Transfer Level of Assistance 1 Moderate assistance;Maximum verbal cues;+2   Trials/Comments 1 " Multiple STS from bed and recliner levels with encouragement. Pt initially pulls up on WW to stand, cues to push from bed/chair for increased safety. Max education for safety. Pt is able to perform subsequent STS from recliner level with safe UE placement and seqencing. Increased time and effort to complete. Max cues to facilitate upright posture with forward gaze.   Activity Tolerance   Endurance Tolerates 10 - 20 min exercise with multiple rests   PT Assessment   PT Assessment Results Decreased strength;Decreased range of motion;Decreased endurance;Impaired balance;Decreased mobility;Decreased safety awareness;Pain;Impaired judgement;Decreased cognition;Orthopedic restrictions   Rehab Prognosis Good   End of Session Communication Bedside nurse   Assessment Comment Pt requires max encouagement for all activities. Pt appears to be agitated, however,  in joking manner with foul language. Pt educated on and reminded of hip precautions thorughout session.   End of Session Patient Position Up in chair;Alarm on     Outcome Measures:  [facility] Basic Mobility  Turning from your back to your side while in a flat bed without using bedrails: A lot  Moving from lying on your back to sitting on the side of a flat bed without using bedrails: A lot  Moving to and from bed to chair (including a wheelchair): A lot  Standing up from a chair using your arms (e.g. wheelchair or bedside chair): A lot  To walk in hospital room: A lot  Climbing 3-5 steps with railing: Total  Basic Mobility - Total Score: 11                             EDUCATION:     Education Documentation  Precautions, taught by Isabel Pablo PTA at 1/1/2025 12:20 PM.  Learner: Patient  Readiness: Acceptance  Method: Explanation  Response: Verbalizes Understanding, Needs Reinforcement    Body Mechanics, taught by Isabel Pablo PTA at 1/1/2025 12:20 PM.  Learner: Patient  Readiness: Acceptance  Method: Explanation  Response: Verbalizes Understanding, Needs  Reinforcement    Mobility Training, taught by Isabel Pablo PTA at 1/1/2025 12:20 PM.  Learner: Patient  Readiness: Acceptance  Method: Explanation  Response: Verbalizes Understanding, Needs Reinforcement    Education Comments  No comments found.        GOALS:  Encounter Problems       Encounter Problems (Active)       PT Problem       Pt will perform bed mobility with mod A.  (Progressing)       Start:  12/31/24    Expected End:  01/14/25            Pt will complete sit <> stand and bed <> chair transfers with mod A.    (Progressing)       Start:  12/31/24    Expected End:  01/14/25            Pt will ambulate 20 feet mod A using FWW with no significant gait deviations.   (Progressing)       Start:  12/31/24    Expected End:  01/14/25            Pt will progress to completing 3 x 20 supine/seated exercises in order to increase strength and improve gait mechanics.   (Progressing)       Start:  12/31/24    Expected End:  01/14/25               Pain - Adult

## 2025-01-01 NOTE — PROGRESS NOTES
"Occupational Therapy    OT Treatment    Patient Name: Dena Short  MRN: [id number]  Today's Date: 1/1/2025  Time Calculation  Start Time: 1043  Stop Time: 1114  Time Calculation (min): 31 min       4114/4114-A    Assessment:  End of Session Communication: Bedside nurse  End of Session Patient Position: Up in chair, Alarm on       Plan:  OT Frequency: 5 times per week  OT Discharge Recommendations: Moderate intensity level of continued care     Subjective      01/01/25 1043   OT Last Visit   OT Received On 01/01/25   General   Reason for Referral recent surgery   Referred By Dr. Vega   Past Medical History Relevant to Rehab To hospital following fall. Pt found to have left hip fracture. Pt underwent left hip hemiarthroplasty 12/20/24. PMH: bradycardia, CAD, cardiomyopathy, CKD, DM, PAD, NSTEMI, cardiac cath, cardioversion, right hip hemiarthropasty   Prior to Session Communication Bedside nurse   Patient Position Received Bed, 3 rail up;Alarm on   General Comment Pt agreeable to tx and cleared for participation.    Pt appears agitated, however in a joking manner and often with foul language. Pt does require encouragement and reassurance as is anxious and fearful of falling.    Precautions   Hearing/Visual Limitations "[employer] blind in R eye   LE Weight Bearing Status Weight Bearing as Tolerated  (L LE)   Medical Precautions Fall precautions   Post-Surgical Precautions Left hip precautions   Precautions Comment Educated in hip precautions   Pain Assessment   Pain Assessment 0-10   0-10 (Numeric) Pain Score   (\"1059\" but does not appear to be in significant amounts of pain)   Pain Type Acute pain   Pain Location Hip   Pain Orientation Right   Pain Interventions Repositioned;Relaxation technique   Cognition   Overall Cognitive Status WFL  (anxious, fearful)   Grooming   Grooming Comments setup pt for grooming tasks- pt declined at this time   Bed Mobility 1   Bed Mobility 1 Supine to sitting   Level of Assistance 1 Moderate " assistance;+2   Bed Mobility Comments 1 HOB elevated, increased time and pacing needed, cues for encouragement  and reassurance, draw sheet utilized   Transfer 1   Transfer From 1 Bed to   Transfer to 1 Stand;Sit   Technique 1 Sit to stand;Stand to sit   Transfer Device 1 Gait belt;Walker   Transfer Level of Assistance 1 Moderate assistance;+2   Trials/Comments 1 STS x2 trials from EOB with v/t cues for technique including UE/LE placement for safety and precautions. Pt pulls up on fww, stablized by therapists, increased time to facilitate upright stance   Transfers 2   Transfer From 2 Bed to   Transfer to 2 Chair with arms   Technique 2 Stand pivot;To right   Transfer Device 2 Gait belt;Walker   Transfer Level of Assistance 2 Moderate assistance;+2   Trials/Comments 2 v/t cues for technique, pt able to take shuffling/turning steps to recliner, hands-on assist for fww managemetn   Transfers 3   Transfer From 3 Chair with arms to   Transfer to 3 Stand;Sit   Technique 3 Sit to stand;Stand to sit   Transfer Device 3 Gait belt;Walker   Transfer Level of Assistance 3 Moderate assistance;+2;Moderate verbal cues;Moderate tactile cues   Trials/Comments 3 STS from recliner to fww level, pushing up from chair, cues to facilitate upright stance   Static Sitting Balance   Static Sitting-Balance Support Feet supported;Bilateral upper extremity supported   Static Sitting-Level of Assistance Close supervision   Static Standing Balance   Static Standing-Balance Support Bilateral upper extremity supported   Static Standing-Level of Assistance Moderate assistance  (x2)   Static Standing-Comment/Number of Minutes increased time to facilitate standing balance and upright stance   Dynamic Standing Balance   Dynamic Standing-Balance Support Bilateral upper extremity supported   Dynamic Standing-Level of Assistance Moderate assistance  (x2)   Dynamic Standing-Balance Turning   IP OT Assessment   End of Session Communication Bedside nurse    End of Session Patient Position Up in chair;Alarm on   Inpatient Plan   OT Frequency 5 times per week   OT Discharge Recommendations Moderate intensity level of continued care     Outcome Measures:[facility] Daily Activity  Putting on and taking off regular lower body clothing: Total  Bathing (including washing, rinsing, drying): Total  Putting on and taking off regular upper body clothing: A little  Toileting, which includes using toilet, bedpan or urinal: A lot  Taking care of personal grooming such as brushing teeth: A little  Eating Meals: A little  Daily Activity - Total Score: 13  Education Documentation  No documentation found.  Education Comments  No comments found.            Goals:  Encounter Problems       Encounter Problems (Active)       OT Goals       Patient will complete UE adls with MOD I. (Progressing)       Start:  12/31/24    Expected End:  01/14/25            Patient will complete LE adls with MOD I. (Progressing)       Start:  12/31/24    Expected End:  01/14/25            Patient will complete transfers with MOD I. (Progressing)       Start:  12/31/24    Expected End:  01/14/25            Patient will complete functional mobility tasks with MOD I. (Progressing)       Start:  12/31/24    Expected End:  01/14/25

## 2025-01-01 NOTE — PROGRESS NOTES
Subjective  Patient had uneventful night does not complain about any chest pain shortness of breath patient is doing better kidney function is getting worse  Nursing staff was interviewed  Objectives    Last Recorded Vitals  Blood pressure 121/63, pulse 58, temperature 36.7 °C (98.1 °F), temperature source Temporal, resp. rate 16, height 1.524 m (5'), weight 73 kg (160 lb 15 oz), SpO2 96%.    Physical Exam  HENT:      Right Ear: External ear normal.      Left Ear: External ear normal.      Mouth/Throat:      Mouth: Mucous membranes are moist.   Cardiovascular:      Rate and Rhythm: Normal rate and regular rhythm.      Heart sounds: No murmur heard.     No friction rub. No gallop.   Pulmonary:      Effort: No accessory muscle usage or respiratory distress.      Breath sounds: No stridor. No wheezing or rhonchi.   Chest:      Chest wall: No tenderness.   Abdominal:      General: There is no distension.      Palpations: There is no mass.      Tenderness: There is no abdominal tenderness. There is no guarding or rebound.   Musculoskeletal:         Limited range of motion over the left hip  Skin:     Coloration: Skin is not jaundiced or pale.      Findings: No lesion.   Neurological:      General: No focal deficit present.      Mental Status: He is alert, oriented to person, place, and time and easily aroused.      Cranial Nerves: No cranial nerve deficit.      Sensory: No sensory deficit.      Motor: No weakness.        Labs    Admission on 12/29/2024   Component Date Value Ref Range Status    WBC 12/29/2024 16.6 (H)  4.4 - 11.3 x10*3/uL Final    nRBC 12/29/2024 0.0  0.0 - 0.0 /100 WBCs Final    RBC 12/29/2024 5.03  4.00 - 5.20 x10*6/uL Final    Hemoglobin 12/29/2024 13.1  12.0 - 16.0 g/dL Final    Hematocrit 12/29/2024 43.5  36.0 - 46.0 % Final    MCV 12/29/2024 87  80 - 100 fL Final    MCH 12/29/2024 26.0  26.0 - 34.0 pg Final    MCHC 12/29/2024 30.1 (L)  32.0 - 36.0 g/dL Final    RDW 12/29/2024 14.9 (H)  11.5 - 14.5  % Final    Platelets 12/29/2024 280  150 - 450 x10*3/uL Final    Neutrophils % 12/29/2024 89.4  40.0 - 80.0 % Final    Immature Granulocytes %, Automated 12/29/2024 0.5  0.0 - 0.9 % Final    Immature Granulocyte Count (IG) includes promyelocytes, myelocytes and metamyelocytes but does not include bands. Percent differential counts (%) should be interpreted in the context of the absolute cell counts (cells/UL).    Lymphocytes % 12/29/2024 3.5  13.0 - 44.0 % Final    Monocytes % 12/29/2024 6.3  2.0 - 10.0 % Final    Eosinophils % 12/29/2024 0.1  0.0 - 6.0 % Final    Basophils % 12/29/2024 0.2  0.0 - 2.0 % Final    Neutrophils Absolute 12/29/2024 14.81 (H)  1.60 - 5.50 x10*3/uL Final    Percent differential counts (%) should be interpreted in the context of the absolute cell counts (cells/uL).    Immature Granulocytes Absolute, Au* 12/29/2024 0.08  0.00 - 0.50 x10*3/uL Final    Lymphocytes Absolute 12/29/2024 0.58 (L)  0.80 - 3.00 x10*3/uL Final    Monocytes Absolute 12/29/2024 1.04 (H)  0.05 - 0.80 x10*3/uL Final    Eosinophils Absolute 12/29/2024 0.01  0.00 - 0.40 x10*3/uL Final    Basophils Absolute 12/29/2024 0.03  0.00 - 0.10 x10*3/uL Final    Glucose 12/29/2024 188 (H)  74 - 99 mg/dL Final    Sodium 12/29/2024 140  136 - 145 mmol/L Final    Potassium 12/29/2024 3.7  3.5 - 5.3 mmol/L Final    Chloride 12/29/2024 100  98 - 107 mmol/L Final    Bicarbonate 12/29/2024 26  21 - 32 mmol/L Final    Anion Gap 12/29/2024 18  10 - 20 mmol/L Final    Urea Nitrogen 12/29/2024 30 (H)  6 - 23 mg/dL Final    Creatinine 12/29/2024 1.35 (H)  0.50 - 1.05 mg/dL Final    eGFR 12/29/2024 37 (L)  >60 mL/min/1.73m*2 Final    Calculations of estimated GFR are performed using the 2021 CKD-EPI Study Refit equation without the race variable for the IDMS-Traceable creatinine methods.  https://jasn.asnjournals.org/content/early/2021/09/22/ASN.9207601964    Calcium 12/29/2024 9.8  8.6 - 10.3 mg/dL Final    Albumin 12/29/2024 4.3  3.4 - 5.0  g/dL Final    Alkaline Phosphatase 12/29/2024 96  33 - 136 U/L Final    Total Protein 12/29/2024 7.7  6.4 - 8.2 g/dL Final    AST 12/29/2024 23  9 - 39 U/L Final    Bilirubin, Total 12/29/2024 1.4 (H)  0.0 - 1.2 mg/dL Final    ALT 12/29/2024 14  7 - 45 U/L Final    Patients treated with Sulfasalazine may generate falsely decreased results for ALT.    Protime 12/29/2024 20.0 (H)  9.8 - 12.8 seconds Final    INR 12/29/2024 1.8 (H)  0.9 - 1.1 Final    Troponin I, High Sensitivity 12/29/2024 131 (HH)  0 - 13 ng/L Final    Ventricular Rate 12/29/2024 101  BPM Final    QRS Duration 12/29/2024 96  ms Final    QT Interval 12/29/2024 372  ms Final    QTC Calculation(Bazett) 12/29/2024 482  ms Final    R Axis 12/29/2024 83  degrees Final    T Axis 12/29/2024 90  degrees Final    QRS Count 12/29/2024 17  beats Final    Q Onset 12/29/2024 219  ms Final    T Offset 12/29/2024 405  ms Final    QTC Fredericia 12/29/2024 442  ms Final    Color, Urine 12/30/2024 Yellow  Light-Yellow, Yellow, Dark-Yellow Final    Appearance, Urine 12/30/2024 Turbid (N)  Clear Final    Specific Gravity, Urine 12/30/2024 1.028  1.005 - 1.035 Final    pH, Urine 12/30/2024 6.0  5.0, 5.5, 6.0, 6.5, 7.0, 7.5, 8.0 Final    Protein, Urine 12/30/2024 50 (1+) (A)  NEGATIVE, 10 (TRACE), 20 (TRACE) mg/dL Final    Glucose, Urine 12/30/2024 OVER (4+) (A)  Normal mg/dL Final    Blood, Urine 12/30/2024 0.03 (TRACE) (A)  NEGATIVE Final    Ketones, Urine 12/30/2024 20 (1+) (A)  NEGATIVE mg/dL Final    Bilirubin, Urine 12/30/2024 NEGATIVE  NEGATIVE Final    Urobilinogen, Urine 12/30/2024 Normal  Normal mg/dL Final    Nitrite, Urine 12/30/2024 NEGATIVE  NEGATIVE Final    Leukocyte Esterase, Urine 12/30/2024 500 Jem/µL (A)  NEGATIVE Final    ABO TYPE 12/29/2024 O   Final    Rh TYPE 12/29/2024 POS   Final    ANTIBODY SCREEN 12/29/2024 NEG   Final    WBC 12/30/2024 12.6 (H)  4.4 - 11.3 x10*3/uL Final    nRBC 12/30/2024 0.0  0.0 - 0.0 /100 WBCs Final    RBC 12/30/2024 4.45   4.00 - 5.20 x10*6/uL Final    Hemoglobin 12/30/2024 11.3 (L)  12.0 - 16.0 g/dL Final    Hematocrit 12/30/2024 38.4  36.0 - 46.0 % Final    MCV 12/30/2024 86  80 - 100 fL Final    MCH 12/30/2024 25.4 (L)  26.0 - 34.0 pg Final    MCHC 12/30/2024 29.4 (L)  32.0 - 36.0 g/dL Final    RDW 12/30/2024 15.3 (H)  11.5 - 14.5 % Final    Platelets 12/30/2024 258  150 - 450 x10*3/uL Final    Glucose 12/30/2024 206 (H)  74 - 99 mg/dL Final    Sodium 12/30/2024 141  136 - 145 mmol/L Final    Potassium 12/30/2024 4.7  3.5 - 5.3 mmol/L Final    Chloride 12/30/2024 102  98 - 107 mmol/L Final    Bicarbonate 12/30/2024 25  21 - 32 mmol/L Final    Anion Gap 12/30/2024 19  10 - 20 mmol/L Final    Urea Nitrogen 12/30/2024 33 (H)  6 - 23 mg/dL Final    Creatinine 12/30/2024 1.36 (H)  0.50 - 1.05 mg/dL Final    eGFR 12/30/2024 36 (L)  >60 mL/min/1.73m*2 Final    Calculations of estimated GFR are performed using the 2021 CKD-EPI Study Refit equation without the race variable for the IDMS-Traceable creatinine methods.  https://jasn.asnjournals.org/content/early/2021/09/22/ASN.9541906339    Calcium 12/30/2024 9.3  8.6 - 10.3 mg/dL Final    POCT Glucose 12/29/2024 230 (H)  74 - 99 mg/dL Final    Magnesium 12/30/2024 2.32  1.60 - 2.40 mg/dL Final    Phosphorus 12/30/2024 3.8  2.5 - 4.9 mg/dL Final    The performance characteristics of phosphorus testing in heparinized plasma have been validated by the individual  laboratory site where testing is performed. Testing on heparinized plasma is not approved by the FDA; however, such approval is not necessary.    Troponin I, High Sensitivity 12/29/2024 153 (HH)  0 - 13 ng/L Final    Previous result verified on 12/29/2024 2026 on specimen/case [id number] called with component [facility] for procedure Troponin I, High Sensitivity with value 131 ng/L.    POCT Glucose 12/29/2024 262 (H)  74 - 99 mg/dL Final    POCT Glucose 12/29/2024 210 (H)  74 - 99 mg/dL Final    Troponin I, High Sensitivity 12/30/2024  148 (HH)  0 - 13 ng/L Final    Previous result verified on 12/29/2024 2026 on specimen/case [id number] called with component [facility] for procedure Troponin I, High Sensitivity with value 131 ng/L.    POCT Glucose 12/30/2024 194 (H)  74 - 99 mg/dL Final    AV pk laron 12/31/2024 1.63  m/s Final    LV Biplane EF 12/31/2024 25  % Final    LVOT diam 12/31/2024 2.07  cm Final    Tricuspid annular plane systolic e* 12/31/2024 1.2  cm Final    LV EF 12/31/2024 25  % Final    RV free wall pk S' 12/31/2024 7.00  cm/s Final    RVSP 12/31/2024 41.3  mmHg Final    LVIDd 12/31/2024 3.93  cm Final    Aortic Valve Area by Continuity of* 12/31/2024 1.22  cm2 Final    AV pk grad 12/31/2024 11  mmHg Final    LV A4C EF 12/31/2024 12.1   Final    Color, Urine 12/30/2024 Yellow  Light-Yellow, Yellow, Dark-Yellow Final    Appearance, Urine 12/30/2024 Turbid (N)  Clear Final    Specific Gravity, Urine 12/30/2024 1.028  1.005 - 1.035 Final    pH, Urine 12/30/2024 6.0  5.0, 5.5, 6.0, 6.5, 7.0, 7.5, 8.0 Final    Protein, Urine 12/30/2024 50 (1+) (A)  NEGATIVE, 10 (TRACE), 20 (TRACE) mg/dL Final    Glucose, Urine 12/30/2024 OVER (4+) (A)  Normal mg/dL Final    Blood, Urine 12/30/2024 0.06 (1+) (A)  NEGATIVE Final    Ketones, Urine 12/30/2024 20 (1+) (A)  NEGATIVE mg/dL Final    Bilirubin, Urine 12/30/2024 NEGATIVE  NEGATIVE Final    Urobilinogen, Urine 12/30/2024 Normal  Normal mg/dL Final    Nitrite, Urine 12/30/2024 NEGATIVE  NEGATIVE Final    Leukocyte Esterase, Urine 12/30/2024 500 Jem/µL (A)  NEGATIVE Final    Extra Tube 12/30/2024 Hold for add-ons.   Final    Auto resulted.    WBC, Urine 12/30/2024 >50 (A)  1-5, NONE /HPF Final    WBC Clumps, Urine 12/30/2024 OCCASIONAL  Reference range not established. /HPF Final    RBC, Urine 12/30/2024 >20 (A)  NONE, 1-2, 3-5 /HPF Final    Squamous Epithelial Cells, Urine 12/30/2024 1-9 (SPARSE)  Reference range not established. /HPF Final    Bacteria, Urine 12/30/2024 4+ (A)  NONE SEEN /HPF Final     Mucus, Urine 12/30/2024 FEW  Reference range not established. /LPF Final    WBC, Urine 12/30/2024 >50 (A)  1-5, NONE /HPF Final    WBC Clumps, Urine 12/30/2024 FEW  Reference range not established. /HPF Final    RBC, Urine 12/30/2024 >20 (A)  NONE, 1-2, 3-5 /HPF Final    Squamous Epithelial Cells, Urine 12/30/2024 1-9 (SPARSE)  Reference range not established. /HPF Final    Bacteria, Urine 12/30/2024 4+ (A)  NONE SEEN /HPF Final    Urine Culture 12/30/2024 >=100,000 CFU/mL Klebsiella pneumoniae/variicola (A)   Final    POCT Glucose 12/30/2024 272 (H)  74 - 99 mg/dL Final    POCT Glucose 12/30/2024 229 (H)  74 - 99 mg/dL Final    WBC 12/31/2024 6.8  4.4 - 11.3 x10*3/uL Final    nRBC 12/31/2024 0.0  0.0 - 0.0 /100 WBCs Final    RBC 12/31/2024 6.43 (H)  4.00 - 5.20 x10*6/uL Final    Hemoglobin 12/31/2024 16.4 (H)  12.0 - 16.0 g/dL Final    Hematocrit 12/31/2024 56.4 (H)  36.0 - 46.0 % Final    MCV 12/31/2024 88  80 - 100 fL Final    MCH 12/31/2024 25.5 (L)  26.0 - 34.0 pg Final    MCHC 12/31/2024 29.1 (L)  32.0 - 36.0 g/dL Final    RDW 12/31/2024 16.1 (H)  11.5 - 14.5 % Final    Platelets 12/31/2024 104 (L)  150 - 450 x10*3/uL Final    Glucose 12/31/2024 249 (H)  74 - 99 mg/dL Final    Sodium 12/31/2024 137  136 - 145 mmol/L Final    Potassium 12/31/2024 4.4  3.5 - 5.3 mmol/L Final    Chloride 12/31/2024 100  98 - 107 mmol/L Final    Bicarbonate 12/31/2024 24  21 - 32 mmol/L Final    Anion Gap 12/31/2024 17  10 - 20 mmol/L Final    Urea Nitrogen 12/31/2024 48 (H)  6 - 23 mg/dL Final    Creatinine 12/31/2024 2.09 (H)  0.50 - 1.05 mg/dL Final    eGFR 12/31/2024 22 (L)  >60 mL/min/1.73m*2 Final    Calculations of estimated GFR are performed using the 2021 CKD-EPI Study Refit equation without the race variable for the IDMS-Traceable creatinine methods.  https://jasn.asnjournals.org/content/early/2021/09/22/ASN.3206370317    Calcium 12/31/2024 8.4 (L)  8.6 - 10.3 mg/dL Final    Magnesium 12/31/2024 2.23  1.60 - 2.40  mg/dL Final    POCT Glucose 12/30/2024 283 (H)  74 - 99 mg/dL Final    RN NOTIFIED    POCT Glucose 12/31/2024 253 (H)  74 - 99 mg/dL Final    RN NOTIFIED    POCT Glucose 12/31/2024 303 (H)  74 - 99 mg/dL Final    POCT Glucose 12/31/2024 346 (H)  74 - 99 mg/dL Final    WBC 01/01/2025 11.6 (H)  4.4 - 11.3 x10*3/uL Final    nRBC 01/01/2025 0.0  0.0 - 0.0 /100 WBCs Final    RBC 01/01/2025 4.06  4.00 - 5.20 x10*6/uL Final    Hemoglobin 01/01/2025 10.5 (L)  12.0 - 16.0 g/dL Final    Hematocrit 01/01/2025 34.9 (L)  36.0 - 46.0 % Final    MCV 01/01/2025 86  80 - 100 fL Final    MCH 01/01/2025 25.9 (L)  26.0 - 34.0 pg Final    MCHC 01/01/2025 30.1 (L)  32.0 - 36.0 g/dL Final    RDW 01/01/2025 15.5 (H)  11.5 - 14.5 % Final    Platelets 01/01/2025 222  150 - 450 x10*3/uL Final    Glucose 01/01/2025 181 (H)  74 - 99 mg/dL Final    Sodium 01/01/2025 134 (L)  136 - 145 mmol/L Final    Potassium 01/01/2025 4.5  3.5 - 5.3 mmol/L Final    Chloride 01/01/2025 99  98 - 107 mmol/L Final    Bicarbonate 01/01/2025 23  21 - 32 mmol/L Final    Anion Gap 01/01/2025 17  10 - 20 mmol/L Final    Urea Nitrogen 01/01/2025 59 (H)  6 - 23 mg/dL Final    Creatinine 01/01/2025 2.36 (H)  0.50 - 1.05 mg/dL Final    eGFR 01/01/2025 19 (L)  >60 mL/min/1.73m*2 Final    Calculations of estimated GFR are performed using the 2021 CKD-EPI Study Refit equation without the race variable for the IDMS-Traceable creatinine methods.  https://jasn.asnjournals.org/content/early/2021/09/22/ASN.8947517738    Calcium 01/01/2025 8.3 (L)  8.6 - 10.3 mg/dL Final    Magnesium 01/01/2025 2.42 (H)  1.60 - 2.40 mg/dL Final    POCT Glucose 12/31/2024 285 (H)  74 - 99 mg/dL Final    RN NOTIFIED    POCT Glucose 12/31/2024 201 (H)  74 - 99 mg/dL Final    POCT Glucose 01/01/2025 188 (H)  74 - 99 mg/dL Final    POCT Glucose 01/01/2025 165 (H)  74 - 99 mg/dL Final         Imaging     Transthoracic Echo (TTE) Complete              [facility]  [address],  [facility] [address]     Tel [phone] Fax [phone]    TRANSTHORACIC ECHOCARDIOGRAM REPORT    Patient Name:       KRISTIN CORTES       Reading Physician:    [id number] Olman Cornejo MD  Study Date:         12/31/2024           Ordering Provider:    [id number] BRANNON LIU  MRN/PID:            [id number]             Fellow:  Accession#:         [id number]         Nurse:                Lyndsay Starks RN  Date of Birth/Age:  1/18/1931 / 89 years Sonographer:          Kenzie Torres                                                                 JADA  Gender Assigned at  F                    Additional Staff:  Birth:  Height:             152.40 cm            Admit Date:  Weight:             72.57 kg             Admission Status:     Inpatient -                                                                 Routine  BSA / BMI:          1.70 m2 / 31.25      Department Location:  [facility]                      kg/m2  Blood Pressure: 138 /86 mmHg    Study Type:    TRANSTHORACIC ECHO (TTE) COMPLETE  Diagnosis/ICD: Old myocardial infarction-I25.2  Indication:    History of NSTEMI; cardiomyopathy  CPT Codes:     Echo Complete w Full Doppler-[id number]    Patient History:  Diabetes:          Yes  Pertinent History: CAD, Dyslipidemia, Cardiomyopathy, A-Fib and HTN.    Study Detail: The following Echo studies were performed: 2D, M-Mode, Doppler and                color flow. Technically challenging study due to patient lying in                supine position. Definity used as a contrast agent for endocardial                border definition. Total contrast used for this procedure was 2 mL                via IV push.       PHYSICIAN INTERPRETATION:  Left Ventricle: Left ventricular ejection fraction is severely decreased, calculated by Tejada's biplane at 25%. There is global hypokinesis of the left  ventricle with minor regional variations. The left ventricular cavity size is mildly dilated. There is mild increased septal and normal posterior left ventricular wall thickness. There is left ventricular concentric remodeling. Left ventricular diastolic filling cannot be determined, due to atrial fibrillation/flutter.  LV Wall Scoring:  The apical septal segment and apex are akinetic. The RCA distribution, entire  lateral wall, entire anterior wall, and basal and mid anterior septum are  hypokinetic.    Left Atrium: The left atrium is mild to moderately dilated.  Right Ventricle: The right ventricle is normal in size. There is normal right ventricular global systolic function.  Right Atrium: The right atrium is normal in size.  Aortic Valve: The aortic valve is trileaflet. There is mild to moderate aortic valve cusp calcification. There is moderate aortic valve thickening. There is evidence of mild aortic valve stenosis.  There is a low aortic valve gradient, with a low ejection fraction. There is no evidence of aortic valve regurgitation. The peak instantaneous gradient of the aortic valve is 11 mmHg. Planimetry opening of 1,2 cm2 moderate stenosis. LVOT VTI only 9 suggestive of low stroke volume ( normal range 16 to 24 cm2).  Mitral Valve: The mitral valve is normal in structure. There is trace to mild mitral valve regurgitation.  Tricuspid Valve: The tricuspid valve is structurally normal. There is mild to moderate tricuspid regurgitation. The Doppler estimated RVSP is mildly elevated right ventricular systolic pressure at 41.3 mmHg.  Pulmonic Valve: The pulmonic valve is structurally normal. There is mild pulmonic valve regurgitation.  Pericardium: Small pericardial effusion posterior to the left ventricle.  Aorta: The aortic root is normal. There is mild dilatation of the ascending aorta. There is no dilatation of the aortic root.  Pulmonary Artery: The main pulmonary artery is normal in size, and position,  with normal bifurcation into the left and right pulmonary arteries.  Systemic Veins: The hepatic vein appears to be of normal size. The inferior vena cava appears normal in size, with IVC inspiratory collapse less than 50%.       CONCLUSIONS:   1. Multiple segmental abnormalities exist. See findings.   2. Left ventricular ejection fraction is severely decreased, calculated by Tejada's biplane at 25%.   3. There is global hypokinesis of the left ventricle with minor regional variations.   4. Left ventricular diastolic filling cannot be determined, due to atrial fibrillation/flutter.   5. Left ventricular cavity size is mildly dilated.   6. There is normal right ventricular global systolic function.   7. The left atrium is mild to moderately dilated.   8. Mild to moderate tricuspid regurgitation.   9. Mildly elevated right ventricular systolic pressure.  10. Mild aortic valve stenosis.    QUANTITATIVE DATA SUMMARY:     2D MEASUREMENTS:           Normal Ranges:  LAs:             4.31 cm   (2.7-4.0cm)  IVSd:            1.04 cm   (0.6-1.1cm)  LVPWd:           0.90 cm   (0.6-1.1cm)  LVIDd:           3.93 cm   (3.9-5.9cm)  LVIDs:           3.42 cm  LV Mass Index:   69.7 g/m2  LV % FS          12.8 %       LA VOLUME:                   Normal Ranges:  LA Area A4C:      18.0 cm2  LA Area A2C:      23.0 cm2  LA Volume Index:  38.0 ml/m2  LA Vol A4C:       48.6 ml  LA Vol A2C:       73.0 ml  LA Vol Index BSA: 35.8 ml/m2       M-MODE MEASUREMENTS:         Normal Ranges:  Ao Root:             2.90 cm (2.0-3.7cm)  AoV Exc:             1.03 cm (1.5-2.5cm)  LVIDd:               3.24 cm (3.9-5.9cm)  LVIDs:               2.54 cm  LV % FS              21.7 %       AORTA MEASUREMENTS:         Normal Ranges:  AoV Exc:            1.03 cm (1.5-2.5cm)  Ao Sinus, d:        3.30 cm (2.1-3.5cm)  Ao STJ, d:          2.70 cm (1.7-3.4cm)  Asc Ao, d:          3.60 cm (2.1-3.4cm)       LV SYSTOLIC FUNCTION BY 2D PLANIMETRY (MOD):                        Normal Ranges:  EF-A4C View:    12 % (>=55%)  EF-A2C View:    40 %  EF-Biplane:     25 %  LV EF Reported: 25 %       LV DIASTOLIC FUNCTION:           Normal Ranges:  MV Peak E:             1.17 m/s  (0.7-1.2 m/s)  MV e'                  0.068 m/s (>8.0)  MV lateral e'          0.10 m/s  MV medial e'           0.04 m/s  E/e' Ratio:            17.31     (<8.0)       MITRAL VALVE:          Normal Ranges:  MV DT:        161 msec (150-240msec)       AORTIC VALVE:            Normal Ranges:  AoV Vmax:      1.63 m/s  (<=1.7m/s)  AoV Peak PG:   10.7 mmHg (<20mmHg)  LVOT Max Cristóbal:  0.59 m/s  (<=1.1m/s)  LVOT VTI:      9.75 cm  LVOT Diameter: 2.07 cm   (1.8-2.4cm)  AoV Area,Vmax: 1.22 cm2  (2.5-4.5cm2)       RIGHT VENTRICLE:  TAPSE: 12.0 mm  RV s'  0.07 m/s       TRICUSPID VALVE/RVSP:          Normal Ranges:  Peak TR Velocity:     3.10 m/s  RV Syst Pressure:     41 mmHg  (< 30mmHg)  IVC Diam:             2.08 cm       PULMONIC VALVE:         Normal Ranges:  PV Accel Time:  80 msec (>120ms)       AORTA:  Asc Ao Diam 3.57 cm       [id number] Olman Cornejo MD  Electronically signed on 12/31/2024 at 6:29:13 PM       Wall Scoring       ** Final **       Patient Active Problem List   Diagnosis    Abnormal weight gain    Abnormality of gait and mobility    Anemia    Basal cell carcinoma (BCC) of face    Bilateral edema of lower extremity    CAD (coronary artery disease)    Carcinoma in situ of skin of other parts of face    Cardiomyopathy    Choroidal detachment, hemorrhagic, right eye    Chronic diastolic heart failure    Urinary retention    Status post glaucoma surgery    CKD (chronic kidney disease), stage IV (Multi)    Skin changes due to chronic exposure to nonionizing radiation, unspecified    Atrial fibrillation (Multi)    Sinus bradycardia    Prolapse of vaginal wall    Primary open angle glaucoma of both eyes, mild stage    Pressure injury of deep tissue of heel    Postmenopausal vaginal bleeding    PAD (peripheral artery  disease) (CMS-HCC)    Osteoarthritis of lumbar spine    Obesity, unspecified    Neoplasm of uncertain behavior of skin    Multiple fractures    Low vitamin D level    Hypokalemia    History of trabeculectomy    History of non-ST elevation myocardial infarction (NSTEMI)    Hip fracture, right    Fatigue    Essential hypertension, benign    Dyslipidemia    Diabetes mellitus (Multi)    Dependent edema    Critical lower limb ischemia (Multi)    Closed fracture of lateral malleolus    Presence of right artificial hip joint    Cardiac arrhythmia    Slow heart rate    Closed fracture of left hip, initial encounter    Fall    Elevated troponin level    Neutrophilic leukocytosis         Assessment/Plan   Assessment & Plan  Closed fracture of left hip, initial encounter      Fall  PT/OT  Elevated troponin level    CKD (chronic kidney disease), stage IV (Multi)    Neutrophilic leukocytosis    Status post open reduction internal fixation of the left hip continue with physical therapy  Acute kidney injury Lasix on hold continue with IV hydration check renal ultrasound urine test and consult nephrology  A-fib rate controlled  History of diabetes continue the sliding scale             ALMAS Martínez MD

## 2025-01-01 NOTE — NURSING NOTE
0815- providers notified of pt's change in hgb status compared to yesterday as well as mag levels.   1230- providers notified of pt's blood pressure changes. LR at 125ml/hr ordered and hung.     1310- report given to oncoming nurse at this time. Patient was stable throughout this shift besides blood pressure. IV fluids given. Pt worked with PT/OT and is sitting up in chair. Dressings dry and intact. No further needs at this time

## 2025-01-01 NOTE — CARE PLAN
The patient's goals for the shift include      The clinical goals for the shift include patient will remain HDS    Patient stable overnight. Blood glucose elevated and covered. No changes to neurovascular assessment. Pain managed with oxy and tylenol. Patient retaining urine, able to urinate 100mL and post void scan showed 430mL. Order for straight cath, straight cath got 500ml cloudy urine out with last 20-30mL draining purulent. VSS.    Problem: Pain - Adult  Goal: Verbalizes/displays adequate comfort level or baseline comfort level  Outcome: Met     Problem: Safety - Adult  Goal: Free from fall injury  Outcome: Met     Problem: Discharge Planning  Goal: Discharge to home or other facility with appropriate resources  Outcome: Progressing     Problem: Chronic Conditions and Co-morbidities  Goal: Patient's chronic conditions and co-morbidity symptoms are monitored and maintained or improved  Outcome: Progressing     Problem: Skin  Goal: Decreased wound size/increased tissue granulation at next dressing change  Outcome: Met  Goal: Participates in plan/prevention/treatment measures  Outcome: Met

## 2025-01-02 ENCOUNTER — APPOINTMENT (OUTPATIENT)
Dept: RADIOLOGY | Facility: HOSPITAL | Age: OVER 89
DRG: 522 | End: 2025-01-02
Payer: MEDICARE

## 2025-01-02 LAB
25(OH)D3 SERPL-MCNC: 27 NG/ML (ref 30–100)
ALBUMIN SERPL BCP-MCNC: 2.9 G/DL (ref 3.4–5)
ALP SERPL-CCNC: 53 U/L (ref 33–136)
ALT SERPL W P-5'-P-CCNC: <3 U/L (ref 7–45)
ANION GAP SERPL CALC-SCNC: 14 MMOL/L (ref 10–20)
APPEARANCE UR: CLEAR
AST SERPL W P-5'-P-CCNC: 17 U/L (ref 9–39)
BILIRUB DIRECT SERPL-MCNC: 0.1 MG/DL (ref 0–0.3)
BILIRUB SERPL-MCNC: 0.6 MG/DL (ref 0–1.2)
BILIRUB UR STRIP.AUTO-MCNC: NEGATIVE MG/DL
BUN SERPL-MCNC: 56 MG/DL (ref 6–23)
CALCIUM SERPL-MCNC: 8.4 MG/DL (ref 8.6–10.3)
CHLORIDE SERPL-SCNC: 99 MMOL/L (ref 98–107)
CHLORIDE UR-SCNC: <15 MMOL/L
CHLORIDE/CREATININE (MMOL/G) IN URINE: NORMAL
CO2 SERPL-SCNC: 27 MMOL/L (ref 21–32)
COLOR UR: YELLOW
CREAT SERPL-MCNC: 1.94 MG/DL (ref 0.5–1.05)
CREAT UR-MCNC: 105.6 MG/DL (ref 20–320)
CREAT UR-MCNC: 105.6 MG/DL (ref 20–320)
EGFRCR SERPLBLD CKD-EPI 2021: 24 ML/MIN/1.73M*2
ERYTHROCYTE [DISTWIDTH] IN BLOOD BY AUTOMATED COUNT: 15.4 % (ref 11.5–14.5)
FOLATE SERPL-MCNC: 14.6 NG/ML
GLUCOSE BLD MANUAL STRIP-MCNC: 109 MG/DL (ref 74–99)
GLUCOSE BLD MANUAL STRIP-MCNC: 161 MG/DL (ref 74–99)
GLUCOSE BLD MANUAL STRIP-MCNC: 78 MG/DL (ref 74–99)
GLUCOSE BLD MANUAL STRIP-MCNC: 82 MG/DL (ref 74–99)
GLUCOSE SERPL-MCNC: 99 MG/DL (ref 74–99)
GLUCOSE UR STRIP.AUTO-MCNC: NORMAL MG/DL
HCT VFR BLD AUTO: 31.9 % (ref 36–46)
HGB BLD-MCNC: 9.6 G/DL (ref 12–16)
KETONES UR STRIP.AUTO-MCNC: NEGATIVE MG/DL
LEUKOCYTE ESTERASE UR QL STRIP.AUTO: ABNORMAL
MAGNESIUM SERPL-MCNC: 2.33 MG/DL (ref 1.6–2.4)
MCH RBC QN AUTO: 26 PG (ref 26–34)
MCHC RBC AUTO-ENTMCNC: 30.1 G/DL (ref 32–36)
MCV RBC AUTO: 86 FL (ref 80–100)
MUCOUS THREADS #/AREA URNS AUTO: ABNORMAL /LPF
NITRITE UR QL STRIP.AUTO: NEGATIVE
NRBC BLD-RTO: 0 /100 WBCS (ref 0–0)
PH UR STRIP.AUTO: 5 [PH]
PHOSPHATE SERPL-MCNC: 3.4 MG/DL (ref 2.5–4.9)
PLATELET # BLD AUTO: 237 X10*3/UL (ref 150–450)
POTASSIUM SERPL-SCNC: 4.2 MMOL/L (ref 3.5–5.3)
POTASSIUM UR-SCNC: 40 MMOL/L
POTASSIUM/CREAT UR-RTO: 38 MMOL/G CREAT
PROT SERPL-MCNC: 5.7 G/DL (ref 6.4–8.2)
PROT UR STRIP.AUTO-MCNC: ABNORMAL MG/DL
PROT UR-ACNC: 43 MG/DL (ref 5–24)
PROT/CREAT UR: 0.41 MG/MG CREAT (ref 0–0.17)
PTH-INTACT SERPL-MCNC: 109.2 PG/ML (ref 18.5–88)
RBC # BLD AUTO: 3.69 X10*6/UL (ref 4–5.2)
RBC # UR STRIP.AUTO: ABNORMAL /UL
RBC #/AREA URNS AUTO: ABNORMAL /HPF
SODIUM SERPL-SCNC: 136 MMOL/L (ref 136–145)
SODIUM UR-SCNC: 10 MMOL/L
SODIUM/CREAT UR-RTO: 9 MMOL/G CREAT
SP GR UR STRIP.AUTO: 1.02
UROBILINOGEN UR STRIP.AUTO-MCNC: NORMAL MG/DL
VIT B12 SERPL-MCNC: 413 PG/ML (ref 211–911)
WBC # BLD AUTO: 10.9 X10*3/UL (ref 4.4–11.3)
WBC #/AREA URNS AUTO: ABNORMAL /HPF
WBC CLUMPS #/AREA URNS AUTO: ABNORMAL /HPF

## 2025-01-02 PROCEDURE — 76770 US EXAM ABDO BACK WALL COMP: CPT | Performed by: STUDENT IN AN ORGANIZED HEALTH CARE EDUCATION/TRAINING PROGRAM

## 2025-01-02 PROCEDURE — 2500000004 HC RX 250 GENERAL PHARMACY W/ HCPCS (ALT 636 FOR OP/ED): Performed by: NURSE PRACTITIONER

## 2025-01-02 PROCEDURE — 84100 ASSAY OF PHOSPHORUS: CPT | Performed by: INTERNAL MEDICINE

## 2025-01-02 PROCEDURE — 97530 THERAPEUTIC ACTIVITIES: CPT | Mod: GO,CO

## 2025-01-02 PROCEDURE — 76770 US EXAM ABDO BACK WALL COMP: CPT

## 2025-01-02 PROCEDURE — 83735 ASSAY OF MAGNESIUM: CPT | Performed by: NURSE PRACTITIONER

## 2025-01-02 PROCEDURE — 97116 GAIT TRAINING THERAPY: CPT | Mod: GP,CQ

## 2025-01-02 PROCEDURE — 2500000001 HC RX 250 WO HCPCS SELF ADMINISTERED DRUGS (ALT 637 FOR MEDICARE OP): Performed by: NURSE PRACTITIONER

## 2025-01-02 PROCEDURE — 36415 COLL VENOUS BLD VENIPUNCTURE: CPT | Performed by: NURSE PRACTITIONER

## 2025-01-02 PROCEDURE — 2500000002 HC RX 250 W HCPCS SELF ADMINISTERED DRUGS (ALT 637 FOR MEDICARE OP, ALT 636 FOR OP/ED): Performed by: NURSE PRACTITIONER

## 2025-01-02 PROCEDURE — 2500000005 HC RX 250 GENERAL PHARMACY W/O HCPCS: Performed by: NURSE PRACTITIONER

## 2025-01-02 PROCEDURE — 97110 THERAPEUTIC EXERCISES: CPT | Mod: GP,CQ

## 2025-01-02 PROCEDURE — 97535 SELF CARE MNGMENT TRAINING: CPT | Mod: GO,CO

## 2025-01-02 PROCEDURE — 2500000001 HC RX 250 WO HCPCS SELF ADMINISTERED DRUGS (ALT 637 FOR MEDICARE OP): Performed by: PHYSICIAN ASSISTANT

## 2025-01-02 PROCEDURE — 82436 ASSAY OF URINE CHLORIDE: CPT | Performed by: INTERNAL MEDICINE

## 2025-01-02 PROCEDURE — 82570 ASSAY OF URINE CREATININE: CPT | Performed by: INTERNAL MEDICINE

## 2025-01-02 PROCEDURE — 82565 ASSAY OF CREATININE: CPT | Performed by: INTERNAL MEDICINE

## 2025-01-02 PROCEDURE — 80076 HEPATIC FUNCTION PANEL: CPT | Performed by: NURSE PRACTITIONER

## 2025-01-02 PROCEDURE — 81001 URINALYSIS AUTO W/SCOPE: CPT | Performed by: INTERNAL MEDICINE

## 2025-01-02 PROCEDURE — 82947 ASSAY GLUCOSE BLOOD QUANT: CPT

## 2025-01-02 PROCEDURE — 87086 URINE CULTURE/COLONY COUNT: CPT | Mod: STJLAB | Performed by: INTERNAL MEDICINE

## 2025-01-02 PROCEDURE — 85027 COMPLETE CBC AUTOMATED: CPT | Performed by: NURSE PRACTITIONER

## 2025-01-02 PROCEDURE — 51701 INSERT BLADDER CATHETER: CPT

## 2025-01-02 PROCEDURE — 1200000002 HC GENERAL ROOM WITH TELEMETRY DAILY

## 2025-01-02 RX ORDER — SODIUM CHLORIDE, SODIUM LACTATE, POTASSIUM CHLORIDE, CALCIUM CHLORIDE 600; 310; 30; 20 MG/100ML; MG/100ML; MG/100ML; MG/100ML
125 INJECTION, SOLUTION INTRAVENOUS CONTINUOUS
Status: DISCONTINUED | OUTPATIENT
Start: 2025-01-02 | End: 2025-01-03

## 2025-01-02 RX ORDER — ACETAMINOPHEN 500 MG
5000 TABLET ORAL DAILY
Status: DISCONTINUED | OUTPATIENT
Start: 2025-01-02 | End: 2025-01-07 | Stop reason: HOSPADM

## 2025-01-02 RX ORDER — ACETAMINOPHEN 325 MG/1
975 TABLET ORAL EVERY 8 HOURS
Status: DISCONTINUED | OUTPATIENT
Start: 2025-01-02 | End: 2025-01-07 | Stop reason: HOSPADM

## 2025-01-02 RX ORDER — OXYCODONE HYDROCHLORIDE 5 MG/1
2.5 TABLET ORAL EVERY 4 HOURS PRN
Status: DISCONTINUED | OUTPATIENT
Start: 2025-01-02 | End: 2025-01-07 | Stop reason: HOSPADM

## 2025-01-02 RX ADMIN — SODIUM CHLORIDE, POTASSIUM CHLORIDE, SODIUM LACTATE AND CALCIUM CHLORIDE 125 ML/HR: 600; 310; 30; 20 INJECTION, SOLUTION INTRAVENOUS at 03:41

## 2025-01-02 RX ADMIN — CHOLECALCIFEROL TAB 125 MCG (5000 UNIT) 5000 UNITS: 125 TAB at 21:51

## 2025-01-02 RX ADMIN — DORZOLAMIDE HYDROCHLORIDE AND TIMOLOL MALEATE 1 DROP: 20; 5 SOLUTION/ DROPS OPHTHALMIC at 10:27

## 2025-01-02 RX ADMIN — BRIMONIDINE TARTRATE 1 DROP: 2 SOLUTION/ DROPS OPHTHALMIC at 10:27

## 2025-01-02 RX ADMIN — CEFTRIAXONE SODIUM 1 G: 1 INJECTION, SOLUTION INTRAVENOUS at 21:49

## 2025-01-02 RX ADMIN — OXYCODONE HYDROCHLORIDE 5 MG: 5 TABLET ORAL at 10:26

## 2025-01-02 RX ADMIN — ACETAMINOPHEN 650 MG: 325 TABLET ORAL at 10:26

## 2025-01-02 RX ADMIN — LIDOCAINE 4% 2 PATCH: 40 PATCH TOPICAL at 21:49

## 2025-01-02 RX ADMIN — ACETAMINOPHEN 975 MG: 325 TABLET ORAL at 21:47

## 2025-01-02 RX ADMIN — ROSUVASTATIN CALCIUM 5 MG: 5 TABLET, FILM COATED ORAL at 21:47

## 2025-01-02 RX ADMIN — RIVAROXABAN 10 MG: 10 TABLET, FILM COATED ORAL at 21:47

## 2025-01-02 RX ADMIN — IRON SUCROSE 300 MG: 20 INJECTION, SOLUTION INTRAVENOUS at 21:50

## 2025-01-02 RX ADMIN — Medication 3 MG: at 21:47

## 2025-01-02 RX ADMIN — INSULIN LISPRO 15 UNITS: 100 INJECTION, SUSPENSION SUBCUTANEOUS at 10:27

## 2025-01-02 ASSESSMENT — COGNITIVE AND FUNCTIONAL STATUS - GENERAL
MOVING FROM LYING ON BACK TO SITTING ON SIDE OF FLAT BED WITH BEDRAILS: A LOT
MOVING TO AND FROM BED TO CHAIR: A LOT
MOVING TO AND FROM BED TO CHAIR: A LOT
STANDING UP FROM CHAIR USING ARMS: A LOT
TOILETING: A LOT
WALKING IN HOSPITAL ROOM: A LOT
DRESSING REGULAR LOWER BODY CLOTHING: TOTAL
CLIMB 3 TO 5 STEPS WITH RAILING: TOTAL
MOBILITY SCORE: 11
WALKING IN HOSPITAL ROOM: A LOT
PERSONAL GROOMING: A LITTLE
DAILY ACTIVITIY SCORE: 14
HELP NEEDED FOR BATHING: A LOT
CLIMB 3 TO 5 STEPS WITH RAILING: TOTAL
STANDING UP FROM CHAIR USING ARMS: A LOT
MOVING FROM LYING ON BACK TO SITTING ON SIDE OF FLAT BED WITH BEDRAILS: A LOT
DRESSING REGULAR UPPER BODY CLOTHING: A LOT
TURNING FROM BACK TO SIDE WHILE IN FLAT BAD: A LOT
TURNING FROM BACK TO SIDE WHILE IN FLAT BAD: A LOT
MOBILITY SCORE: 11

## 2025-01-02 ASSESSMENT — PAIN - FUNCTIONAL ASSESSMENT
PAIN_FUNCTIONAL_ASSESSMENT: 0-10

## 2025-01-02 ASSESSMENT — ACTIVITIES OF DAILY LIVING (ADL): HOME_MANAGEMENT_TIME_ENTRY: 10

## 2025-01-02 ASSESSMENT — PAIN SCALES - GENERAL
PAINLEVEL_OUTOF10: 3
PAINLEVEL_OUTOF10: 4
PAINLEVEL_OUTOF10: 6
PAINLEVEL_OUTOF10: 6

## 2025-01-02 ASSESSMENT — PAIN DESCRIPTION - DESCRIPTORS: DESCRIPTORS: ACHING

## 2025-01-02 ASSESSMENT — PAIN SCALES - PAIN ASSESSMENT IN ADVANCED DEMENTIA (PAINAD): TOTALSCORE: MEDICATION (SEE MAR)

## 2025-01-02 NOTE — PROGRESS NOTES
Physical Therapy    Physical Therapy    Physical Therapy Treatment    Patient Name: Dena Short  MRN: [id number]  Today's Date: 1/2/2025  Time Calculation  Start Time: 0954  Stop Time: 1025  Time Calculation (min): 31 min     4114/4114-A    Assessment/Plan   PT Assessment  PT Assessment Results: Decreased strength, Decreased range of motion, Decreased endurance, Impaired balance, Decreased mobility, Decreased safety awareness, Pain, Impaired judgement, Decreased cognition, Orthopedic restrictions  End of Session Communication: Bedside nurse  End of Session Patient Position: Up in chair, Alarm on  PT Plan  Inpatient/Swing Bed or Outpatient: Inpatient  PT Plan  Treatment/Interventions: Bed mobility, Transfer training, Gait training, Balance training, Neuromuscular re-education, Strengthening, Endurance training, Range of motion, Therapeutic exercise, Therapeutic activity, Home exercise program  PT Plan: Ongoing PT  PT Frequency: Daily  PT Discharge Recommendations: Moderate intensity level of continued care  Equipment Recommended upon Discharge: Wheeled walker  PT Recommended Transfer Status: Assist x2  PT - OK to Discharge: Yes )     01/02/25 0954   PT  Visit   PT Received On 01/02/25   General   Reason for Referral recent surgery   Referred By Dr. Vega   Co-Treatment Reason for safety   Patient Position Received Bed, 3 rail up;Alarm on   Precautions   LE Weight Bearing Status Weight Bearing as Tolerated   Medical Precautions Fall precautions   Post-Surgical Precautions Left hip precautions   Pain Assessment   Pain Assessment 0-10   0-10 (Numeric) Pain Score 6   Pain Type Surgical pain   Pain Location Hip   Pain Orientation Left   Therapeutic Exercise   Therapeutic Exercise Performed Yes   Therapeutic Exercise Activity 1 AP,  QS,  GS,  Heelslides,  LAQ,  Ball squeezes,  Hip Abduction x 10  each B  with emphasis on  L LE assist fro active exercises   Bed Mobility   Bed Mobility Yes   Bed Mobility 1   Bed Mobility 1  Supine to sitting   Level of Assistance 1 Moderate assistance;Moderate verbal cues;+2   Ambulation/Gait Training   Ambulation/Gait Training Performed Yes   Ambulation/Gait Training 1   Surface 1 Level tile   Device 1 Rolling walker   Gait Support Devices Gait belt   Assistance 1 Moderate assistance;Maximum assistance  (x2)   Quality of Gait 1 Decreased step length;Inconsistent stride length;Forward flexed posture  (Patient needing assist to maneuver the walker and cues for sequencing and sfaety)   Comments/Distance (ft) 1 4 turning steps bed-commode, then 8 forward feet with  close chair follow   Transfers   Transfer Yes   Transfer 1   Technique 1 Sit to stand;Stand to sit   Transfer Device 1 Walker   Transfer Level of Assistance 1 Moderate assistance;Maximum assistance  (x2)   Trials/Comments 1 x3   PT Assessment   PT Assessment Results Decreased strength;Decreased range of motion;Decreased endurance;Impaired balance;Decreased mobility;Decreased safety awareness;Pain;Impaired judgement;Decreased cognition;Orthopedic restrictions   End of Session Communication Bedside nurse   End of Session Patient Position Up in chair;Alarm on   PT Plan   Inpatient/Swing Bed or Outpatient Inpatient     Outcome Measures:  [facility] Basic Mobility  Turning from your back to your side while in a flat bed without using bedrails: A lot  Moving from lying on your back to sitting on the side of a flat bed without using bedrails: A lot  Moving to and from bed to chair (including a wheelchair): A lot  Standing up from a chair using your arms (e.g. wheelchair or bedside chair): A lot  To walk in hospital room: A lot  Climbing 3-5 steps with railing: Total  Basic Mobility - Total Score: 11                             EDUCATION:     Education Documentation  No documentation found.  Education Comments  No comments found.        GOALS:  Encounter Problems       Encounter Problems (Active)       PT Problem       Pt will perform bed mobility with mod  A.  (Progressing)       Start:  12/31/24    Expected End:  01/14/25            Pt will complete sit <> stand and bed <> chair transfers with mod A.    (Progressing)       Start:  12/31/24    Expected End:  01/14/25            Pt will ambulate 20 feet mod A using FWW with no significant gait deviations.   (Progressing)       Start:  12/31/24    Expected End:  01/14/25            Pt will progress to completing 3 x 20 supine/seated exercises in order to increase strength and improve gait mechanics.   (Progressing)       Start:  12/31/24    Expected End:  01/14/25               Pain - Adult

## 2025-01-02 NOTE — PROGRESS NOTES
Subjective  Patient had uneventful night does not complain about any chest pain shortness of breath kidney function is doing better  Nursing staff was interviewed  Objectives    Last Recorded Vitals  Blood pressure 142/73, pulse 83, temperature 37.6 °C (99.7 °F), temperature source Temporal, resp. rate 17, height 1.524 m (5'), weight 73 kg (160 lb 15 oz), SpO2 96%.    Physical Exam  HENT:      Right Ear: External ear normal.      Left Ear: External ear normal.      Mouth/Throat:      Mouth: Mucous membranes are moist.   Cardiovascular:      Rate and Rhythm: Normal rate and regular rhythm.      Heart sounds: No murmur heard.     No friction rub. No gallop.   Pulmonary:      Effort: No accessory muscle usage or respiratory distress.      Breath sounds: No stridor. No wheezing or rhonchi.   Chest:      Chest wall: No tenderness.   Abdominal:      General: There is no distension.      Palpations: There is no mass.      Tenderness: There is no abdominal tenderness. There is no guarding or rebound.   Musculoskeletal:         General: No deformity or signs of injury.      Cervical back: No rigidity or tenderness. Normal range of motion.      Right lower leg: No edema.      Left lower leg: No edema.   Skin:     Coloration: Skin is not jaundiced or pale.      Findings: No lesion.   Neurological:      General: No focal deficit present.      Mental Status: He is alert, oriented to person, place, and time and easily aroused.      Cranial Nerves: No cranial nerve deficit.      Sensory: No sensory deficit.      Motor: No weakness.        Labs    No results displayed because visit has over 200 results.            Imaging     Transthoracic Echo (TTE) Complete              [facility]  [id number] [address] [address], [place] [facility] [id number]     Tel [phone] Fax [phone]    TRANSTHORACIC ECHOCARDIOGRAM REPORT    Patient Name:       KRISTIN SOPHIA Downing Physician:    Kenya Thurman                                                                  Chnatal MALONE  Study Date:         12/31/2024           Ordering Provider:    [id number] BRANNON LIU  MRN/PID:            [id number]             Fellow:  Accession#:         [id number]         Nurse:                Lyndsay Starks RN  Date of Birth/Age:  1/18/1931 / 89 years Sonographer:          Kenzie Torres RDCS  Gender Assigned at  F                    Additional Staff:  Birth:  Height:             152.40 cm            Admit Date:  Weight:             72.57 kg             Admission Status:     Inpatient -                                                                 Routine  BSA / BMI:          1.70 m2 / 31.25      Department Location:  [facility] Ortho/Spine                      kg/m2  Blood Pressure: 138 /86 mmHg    Study Type:    TRANSTHORACIC ECHO (TTE) COMPLETE  Diagnosis/ICD: Old myocardial infarction-I25.2  Indication:    History of NSTEMI; cardiomyopathy  CPT Codes:     Echo Complete w Full [id number]    Patient History:  Diabetes:          Yes  Pertinent History: CAD, Dyslipidemia, Cardiomyopathy, A-Fib and HTN.    Study Detail: The following Echo studies were performed: 2D, M-Mode, Doppler and                color flow. Technically challenging study due to patient lying in                supine position. Definity used as a contrast agent for endocardial                border definition. Total contrast used for this procedure was 2 mL                via IV push.       PHYSICIAN INTERPRETATION:  Left Ventricle: Left ventricular ejection fraction is severely decreased, calculated by Tejada's biplane at 25%. There is global hypokinesis of the left ventricle with minor regional variations. The left ventricular cavity size is mildly dilated. There is mild increased septal and normal posterior left ventricular wall thickness. There is left ventricular concentric  remodeling. Left ventricular diastolic filling cannot be determined, due to atrial fibrillation/flutter.  LV Wall Scoring:  The apical septal segment and apex are akinetic. The RCA distribution, entire  lateral wall, entire anterior wall, and basal and mid anterior septum are  hypokinetic.    Left Atrium: The left atrium is mild to moderately dilated.  Right Ventricle: The right ventricle is normal in size. There is normal right ventricular global systolic function.  Right Atrium: The right atrium is normal in size.  Aortic Valve: The aortic valve is trileaflet. There is mild to moderate aortic valve cusp calcification. There is moderate aortic valve thickening. There is evidence of mild aortic valve stenosis.  There is a low aortic valve gradient, with a low ejection fraction. There is no evidence of aortic valve regurgitation. The peak instantaneous gradient of the aortic valve is 11 mmHg. Planimetry opening of 1,2 cm2 moderate stenosis. LVOT VTI only 9 suggestive of low stroke volume ( normal range 16 to 24 cm2).  Mitral Valve: The mitral valve is normal in structure. There is trace to mild mitral valve regurgitation.  Tricuspid Valve: The tricuspid valve is structurally normal. There is mild to moderate tricuspid regurgitation. The Doppler estimated RVSP is mildly elevated right ventricular systolic pressure at 41.3 mmHg.  Pulmonic Valve: The pulmonic valve is structurally normal. There is mild pulmonic valve regurgitation.  Pericardium: Small pericardial effusion posterior to the left ventricle.  Aorta: The aortic root is normal. There is mild dilatation of the ascending aorta. There is no dilatation of the aortic root.  Pulmonary Artery: The main pulmonary artery is normal in size, and position, with normal bifurcation into the left and right pulmonary arteries.  Systemic Veins: The hepatic vein appears to be of normal size. The inferior vena cava appears normal in size, with IVC inspiratory collapse less than  50%.       CONCLUSIONS:   1. Multiple segmental abnormalities exist. See findings.   2. Left ventricular ejection fraction is severely decreased, calculated by Tejada's biplane at 25%.   3. There is global hypokinesis of the left ventricle with minor regional variations.   4. Left ventricular diastolic filling cannot be determined, due to atrial fibrillation/flutter.   5. Left ventricular cavity size is mildly dilated.   6. There is normal right ventricular global systolic function.   7. The left atrium is mild to moderately dilated.   8. Mild to moderate tricuspid regurgitation.   9. Mildly elevated right ventricular systolic pressure.  10. Mild aortic valve stenosis.    QUANTITATIVE DATA SUMMARY:     2D MEASUREMENTS:           Normal Ranges:  LAs:             4.31 cm   (2.7-4.0cm)  IVSd:            1.04 cm   (0.6-1.1cm)  LVPWd:           0.90 cm   (0.6-1.1cm)  LVIDd:           3.93 cm   (3.9-5.9cm)  LVIDs:           3.42 cm  LV Mass Index:   69.7 g/m2  LV % FS          12.8 %       LA VOLUME:                   Normal Ranges:  LA Area A4C:      18.0 cm2  LA Area A2C:      23.0 cm2  LA Volume Index:  38.0 ml/m2  LA Vol A4C:       48.6 ml  LA Vol A2C:       73.0 ml  LA Vol Index BSA: 35.8 ml/m2       M-MODE MEASUREMENTS:         Normal Ranges:  Ao Root:             2.90 cm (2.0-3.7cm)  AoV Exc:             1.03 cm (1.5-2.5cm)  LVIDd:               3.24 cm (3.9-5.9cm)  LVIDs:               2.54 cm  LV % FS              21.7 %       AORTA MEASUREMENTS:         Normal Ranges:  AoV Exc:            1.03 cm (1.5-2.5cm)  Ao Sinus, d:        3.30 cm (2.1-3.5cm)  Ao STJ, d:          2.70 cm (1.7-3.4cm)  Asc Ao, d:          3.60 cm (2.1-3.4cm)       LV SYSTOLIC FUNCTION BY 2D PLANIMETRY (MOD):                       Normal Ranges:  EF-A4C View:    12 % (>=55%)  EF-A2C View:    40 %  EF-Biplane:     25 %  LV EF Reported: 25 %       LV DIASTOLIC FUNCTION:           Normal Ranges:  MV Peak E:             1.17 m/s  (0.7-1.2  m/s)  MV e'                  0.068 m/s (>8.0)  MV lateral e'          0.10 m/s  MV medial e'           0.04 m/s  E/e' Ratio:            17.31     (<8.0)       MITRAL VALVE:          Normal Ranges:  MV DT:        161 msec (150-240msec)       AORTIC VALVE:            Normal Ranges:  AoV Vmax:      1.63 m/s  (<=1.7m/s)  AoV Peak PG:   10.7 mmHg (<20mmHg)  LVOT Max Cristóbal:  0.59 m/s  (<=1.1m/s)  LVOT VTI:      9.75 cm  LVOT Diameter: 2.07 cm   (1.8-2.4cm)  AoV Area,Vmax: 1.22 cm2  (2.5-4.5cm2)       RIGHT VENTRICLE:  TAPSE: 12.0 mm  RV s'  0.07 m/s       TRICUSPID VALVE/RVSP:          Normal Ranges:  Peak TR Velocity:     3.10 m/s  RV Syst Pressure:     41 mmHg  (< 30mmHg)  IVC Diam:             2.08 cm       PULMONIC VALVE:         Normal Ranges:  PV Accel Time:  80 msec (>120ms)       AORTA:  Asc Ao Diam 3.57 cm       [id number] Olman Cornejo MD  Electronically signed on 12/31/2024 at 6:29:13 PM       Wall Scoring       ** Final **       Patient Active Problem List   Diagnosis    Abnormal weight gain    Abnormality of gait and mobility    Anemia    Basal cell carcinoma (BCC) of face    Bilateral edema of lower extremity    CAD (coronary artery disease)    Carcinoma in situ of skin of other parts of face    Cardiomyopathy    Choroidal detachment, hemorrhagic, right eye    Chronic diastolic heart failure    Urinary retention    Status post glaucoma surgery    CKD (chronic kidney disease), stage IV (Multi)    Skin changes due to chronic exposure to nonionizing radiation, unspecified    Atrial fibrillation (Multi)    Sinus bradycardia    Prolapse of vaginal wall    Primary open angle glaucoma of both eyes, mild stage    Pressure injury of deep tissue of heel    Postmenopausal vaginal bleeding    PAD (peripheral artery disease) (CMS-HCC)    Osteoarthritis of lumbar spine    Obesity, unspecified    Neoplasm of uncertain behavior of skin    Multiple fractures    Low vitamin D level    Hypokalemia    History of trabeculectomy     History of non-ST elevation myocardial infarction (NSTEMI)    Hip fracture, right    Fatigue    Essential hypertension, benign    Dyslipidemia    Diabetes mellitus (Multi)    Dependent edema    Critical lower limb ischemia (Multi)    Closed fracture of lateral malleolus    Presence of right artificial hip joint    Cardiac arrhythmia    Slow heart rate    Closed fracture of left hip, initial encounter    Fall    Elevated troponin level    Neutrophilic leukocytosis         Assessment/Plan   Assessment & Plan  Closed fracture of left hip, initial encounter      Fall  PT/OT  Elevated troponin level    CKD (chronic kidney disease), stage IV (Multi)    Neutrophilic leukocytosis    Left hip fracture status post open reduction internal fixation patient pain is significantly less today doing okay  Acute kidney injury on CKD stage III creatinine is improving today 1.94 waiting for kidney ultrasound and nephrology evaluation continue with IV hydration       I spent 15 minutes in the professional and overall care of this patient.      ALMAS Martínez MD

## 2025-01-02 NOTE — CARE PLAN
The patient's goals for the shift include      The clinical goals for the shift include pt will remained hds    Over the shift, the patient did  make progress toward the following goals.pt is alert and oriented x3 pt has allowed staff to reposition her [id number] pt has protective mepilex on sacrum, , skin intact, pt continue to have issues with inablity to void, straight cath for 500ml  pt has slept in long intervals and remained safe this shift,

## 2025-01-02 NOTE — CARE PLAN
The patient's goals for the shift include      The clinical goals for the shift include pt will remained hds      Problem: Skin  Goal: Prevent/minimize sheer/friction injuries  Flowsheets (Taken 1/2/2025 1502)  Prevent/minimize sheer/friction injuries:   HOB 30 degrees or less   Increase activity/out of bed for meals

## 2025-01-02 NOTE — PROGRESS NOTES
01/02/25 1446   Discharge Planning   Living Arrangements Alone   Support Systems Family members   Type of Residence Private residence   Home or Post Acute Services Post acute facilities (Rehab/SNF/etc)   Type of Post Acute Facility Services Skilled nursing   Expected Discharge Disposition SNF   Does the patient need discharge transport arranged? Yes   RoundTrip coordination needed? Yes   Patient Choice   Provider Choice list and CMS website (https://medicare.gov/care-compare#search) for post-acute Quality and Resource Measure Data were provided and reviewed with: Family     [facility] is able to accept patient. Requested direct auth team start precert. ADOD 1/3. SW and pt updated.

## 2025-01-02 NOTE — PROGRESS NOTES
Occupational Therapy    OT Treatment    Patient Name: Dena Short  MRN: [id number]  Today's Date: 1/2/2025  Time Calculation  Start Time: 0941  Stop Time: 1022  Time Calculation (min): 41 min       4114/4114-A    Assessment:  OT Assessment: Ptaient demonstrated decreased independence in ADL tasks and functional mobility and could benefit from continued O.T. services.  End of Session Communication: Bedside nurse  End of Session Patient Position: Up in chair, Alarm on       Plan:  OT Frequency: 5 times per week     Subjective     Current Problem:  Patient Active Problem List   Diagnosis    Abnormal weight gain    Abnormality of gait and mobility    Anemia    Basal cell carcinoma (BCC) of face    Bilateral edema of lower extremity    CAD (coronary artery disease)    Carcinoma in situ of skin of other parts of face    Cardiomyopathy    Choroidal detachment, hemorrhagic, right eye    Chronic diastolic heart failure    Urinary retention    Status post glaucoma surgery    CKD (chronic kidney disease), stage IV (Multi)    Skin changes due to chronic exposure to nonionizing radiation, unspecified    Atrial fibrillation (Multi)    Sinus bradycardia    Prolapse of vaginal wall    Primary open angle glaucoma of both eyes, mild stage    Pressure injury of deep tissue of heel    Postmenopausal vaginal bleeding    PAD (peripheral artery disease) (CMS-HCC)    Osteoarthritis of lumbar spine    Obesity, unspecified    Neoplasm of uncertain behavior of skin    Multiple fractures    Low vitamin D level    Hypokalemia    History of trabeculectomy    History of non-ST elevation myocardial infarction (NSTEMI)    Hip fracture, right    Fatigue    Essential hypertension, benign    Dyslipidemia    Diabetes mellitus (Multi)    Dependent edema    Critical lower limb ischemia (Multi)    Closed fracture of lateral malleolus    Presence of right artificial hip joint    Cardiac arrhythmia    Slow heart rate    Closed fracture of left hip, initial  encounter    Fall    Elevated troponin level    Neutrophilic leukocytosis       General:  OT Received On: 01/02/25  Reason for Referral: recent surgery  Referred By: Dr. Vega  Co-Treatment Reason: for safety  Patient Position Received: Bed, 3 rail up, Alarm on  General Comment: Patient in bed and with encouragement was agreeable to working with therapy.    Vital Signs:       Pain:  Pain Assessment  Pain Assessment: 0-10  0-10 (Numeric) Pain Score: 6  Pain Type: Surgical pain  Pain Location: Hip  Pain Orientation: Left  Objective      Activities of Daily Living:                LE Dressing  Adult Briefs Level of Assistance:  (x2)  LE Dressing Comments: Patient was maximum assist of two to don hospital issue underpants with pad.  Toileting  Toileting Comments: With first stand from EOB patient started to urinate and was assisted back to sitting edge of bed.  Bedside commode was placed next to bed and stand/pivot transfer with maximum assist of two. (Patient anxious with tranfers and expressed being fearful. Provided encouragement and support.)    Functional Standing Tolerance:       Bed Mobility/Transfers: Bed Mobility  Bed Mobility: Yes  Bed Mobility 1  Bed Mobility 1: Supine to sitting  Level of Assistance 1: Moderate assistance, +2  Transfers  Transfer: Yes  Transfer 1  Transfer From 1: Sit to  Transfer to 1: Stand  Transfer Device 1: Walker, Gait belt  Transfer Level of Assistance 1: Maximum assistance  Trials/Comments 1: Assist of two for safe transfer at this time.  Transfers 2  Trials/Comments 2: When finished on bedside commode, patient took a few forward steps with maximum assist and a close chair follow. Hand over hand assist to reach back for arms of chair stand to sit.  Patient attempted to scoot back into seat but did require assist of two.                Therapy/Activity:               Strength:       Other Activity:       Outcome Measures:[facility] Daily Activity  Putting on and taking off regular lower body  clothing: Total  Bathing (including washing, rinsing, drying): A lot  Putting on and taking off regular upper body clothing: A lot  Toileting, which includes using toilet, bedpan or urinal: A lot  Taking care of personal grooming such as brushing teeth: A little  Eating Meals: None  Daily Activity - Total Score: 14  Education Documentation  No documentation found.  Education Comments  No comments found.           EDUCATION:       Goals:  Encounter Problems       Encounter Problems (Active)       OT Goals       Patient will complete UE adls with MOD I. (Progressing)       Start:  12/31/24    Expected End:  01/14/25            Patient will complete LE adls with MOD I. (Progressing)       Start:  12/31/24    Expected End:  01/14/25            Patient will complete transfers with MOD I. (Progressing)       Start:  12/31/24    Expected End:  01/14/25            Patient will complete functional mobility tasks with MOD I. (Progressing)       Start:  12/31/24    Expected End:  01/14/25                       Occupational Therapy    OT Treatment    Patient Name: Dena Short  MRN: [id number]  Today's Date: 1/2/2025  Time Calculation  Start Time: 0941  Stop Time: 1022  Time Calculation (min): 41 min       4114/4114-A    Assessment:  OT Assessment: Ptaient demonstrated decreased independence in ADL tasks and functional mobility and could benefit from continued O.T. services.  End of Session Communication: Bedside nurse  End of Session Patient Position: Up in chair, Alarm on       Plan:  OT Frequency: 5 times per week     Subjective     Current Problem:  Patient Active Problem List   Diagnosis    Abnormal weight gain    Abnormality of gait and mobility    Anemia    Basal cell carcinoma (BCC) of face    Bilateral edema of lower extremity    CAD (coronary artery disease)    Carcinoma in situ of skin of other parts of face    Cardiomyopathy    Choroidal detachment, hemorrhagic, right eye    Chronic diastolic heart failure     Urinary retention    Status post glaucoma surgery    CKD (chronic kidney disease), stage IV (Multi)    Skin changes due to chronic exposure to nonionizing radiation, unspecified    Atrial fibrillation (Multi)    Sinus bradycardia    Prolapse of vaginal wall    Primary open angle glaucoma of both eyes, mild stage    Pressure injury of deep tissue of heel    Postmenopausal vaginal bleeding    PAD (peripheral artery disease) (CMS-[facility])    Osteoarthritis of lumbar spine    Obesity, unspecified    Neoplasm of uncertain behavior of skin    Multiple fractures    Low vitamin D level    Hypokalemia    History of trabeculectomy    History of non-ST elevation myocardial infarction (NSTEMI)    Hip fracture, right    Fatigue    Essential hypertension, benign    Dyslipidemia    Diabetes mellitus (Multi)    Dependent edema    Critical lower limb ischemia (Multi)    Closed fracture of lateral malleolus    Presence of right artificial hip joint    Cardiac arrhythmia    Slow heart rate    Closed fracture of left hip, initial encounter    Fall    Elevated troponin level    Neutrophilic leukocytosis       General:  OT Received On: 01/02/25  Reason for Referral: recent surgery  Referred By: Dr. Vega  Co-Treatment Reason: for safety  Patient Position Received: Bed, 3 rail up, Alarm on  General Comment: Patient in bed and with encouragement was agreeable to working with therapy.    Vital Signs:       Pain:  Pain Assessment  Pain Assessment: 0-10  0-10 (Numeric) Pain Score: 6  Pain Type: Surgical pain  Pain Location: Hip  Pain Orientation: Left  Objective      Activities of Daily Living:                LE Dressing  Adult Briefs Level of Assistance:  (x2)  LE Dressing Comments: Patient was maximum assist of two to don hospital issue underpants with pad.  Toileting  Toileting Comments: With first stand from EOB patient started to urinate and was assisted back to sitting edge of bed.  Bedside commode was placed next to bed and stand/pivot  transfer with maximum assist of two. (Patient anxious with tranfers and expressed being fearful. Provided encouragement and support.)    Functional Standing Tolerance:       Bed Mobility/Transfers: Bed Mobility  Bed Mobility: Yes  Bed Mobility 1  Bed Mobility 1: Supine to sitting  Level of Assistance 1: Moderate assistance, +2  Transfers  Transfer: Yes  Transfer 1  Transfer From 1: Sit to  Transfer to 1: Stand  Transfer Device 1: Walker, Gait belt  Transfer Level of Assistance 1: Maximum assistance  Trials/Comments 1: Assist of two for safe transfer at this time.  Transfers 2  Trials/Comments 2: When finished on bedside commode, patient took a few forward steps with maximum assist and a close chair follow. Hand over hand assist to reach back for arms of chair stand to sit.  Patient attempted to scoot back into seat but did require assist of two.                Therapy/Activity:               Strength:       Other Activity:       Outcome Measures:[facility] Daily Activity  Putting on and taking off regular lower body clothing: Total  Bathing (including washing, rinsing, drying): A lot  Putting on and taking off regular upper body clothing: A lot  Toileting, which includes using toilet, bedpan or urinal: A lot  Taking care of personal grooming such as brushing teeth: A little  Eating Meals: None  Daily Activity - Total Score: 14  Education Documentation  No documentation found.  Education Comments  No comments found.           EDUCATION:       Goals:  Encounter Problems       Encounter Problems (Active)       OT Goals       Patient will complete UE adls with MOD I. (Progressing)       Start:  12/31/24    Expected End:  01/14/25            Patient will complete LE adls with MOD I. (Progressing)       Start:  12/31/24    Expected End:  01/14/25            Patient will complete transfers with MOD I. (Progressing)       Start:  12/31/24    Expected End:  01/14/25            Patient will complete functional mobility tasks with  MOD I. (Progressing)       Start:  12/31/24    Expected End:  01/14/25

## 2025-01-02 NOTE — PROGRESS NOTES
Dena Short is a 89 y.o. female on day 4 of admission presenting with Closed fracture of left hip, initial encounter.    Subjective   Ms Zia Short rouses from sleep to name. She tells me that she has mild left hip discomfort.  No complaints of chest pain, shortness of breath, lightheaded or dizziness.  Tolerating a diet with no nausea vomiting.  Encourage use of incentive spirometry.       Objective     Physical Exam  Constitutional:       Appearance: Normal appearance.   HENT:      Head: Normocephalic and atraumatic.      Nose: Nose normal.      Mouth/Throat:      Mouth: Mucous membranes are moist.   Cardiovascular:      Rate and Rhythm: Normal rate.   Pulmonary:      Effort: Pulmonary effort is normal.   Abdominal:      General: Abdomen is flat.      Palpations: Abdomen is soft.   Musculoskeletal:      Cervical back: Neck supple.      Comments: Left hip with surgical dressing clean dry and intact, sensation present throughout left lower extremity, plantar and dorsiflexion against resistance in left foot, DP pulse in left foot doppleable   Skin:     General: Skin is warm and dry.   Neurological:      General: No focal deficit present.      Mental Status: She is alert.   Psychiatric:         Mood and Affect: Mood normal.         Last Recorded Vitals  Blood pressure (!) 78/33, pulse 70, temperature 37.1 °C (98.8 °F), temperature source Temporal, resp. rate 18, height 1.524 m (5'), weight 73 kg (160 lb 15 oz), SpO2 96%.  Intake/Output last 3 Shifts:  I/O last 3 completed shifts:  In: 2429.6 (33.3 mL/kg) [P.O.:90; I.V.:2239.6 (30.7 mL/kg); IV Piggyback:100]  Out: 1100 (15.1 mL/kg) [Urine:1100 (0.4 mL/kg/hr)]  Weight: 73 kg     Relevant Results  Results for orders placed or performed during the hospital encounter of 12/29/24 (from the past 24 hours)   POCT GLUCOSE   Result Value Ref Range    POCT Glucose 195 (H) 74 - 99 mg/dL   Iron and TIBC   Result Value Ref Range    Iron 10 (L) 35 - 150 ug/dL    UIBC 331 110 - 370  ug/dL    TIBC 341 240 - 445 ug/dL    % Saturation 3 (L) 25 - 45 %   Ferritin   Result Value Ref Range    Ferritin 60 8 - 150 ng/mL   Vitamin B12   Result Value Ref Range    Vitamin B12 413 211 - 911 pg/mL   Folate   Result Value Ref Range    Folate, Serum 14.6 >5.0 ng/mL   Vitamin D 25-Hydroxy,Total (for eval of Vitamin D levels)   Result Value Ref Range    Vitamin D, 25-Hydroxy, Total 27 (L) 30 - 100 ng/mL   Parathyroid Hormone, Intact   Result Value Ref Range    Parathyroid Hormone, Intact 109.2 (H) 18.5 - 88.0 pg/mL   POCT GLUCOSE   Result Value Ref Range    POCT Glucose 139 (H) 74 - 99 mg/dL   CBC   Result Value Ref Range    WBC 10.9 4.4 - 11.3 x10*3/uL    nRBC 0.0 0.0 - 0.0 /100 WBCs    RBC 3.69 (L) 4.00 - 5.20 x10*6/uL    Hemoglobin 9.6 (L) 12.0 - 16.0 g/dL    Hematocrit 31.9 (L) 36.0 - 46.0 %    MCV 86 80 - 100 fL    MCH 26.0 26.0 - 34.0 pg    MCHC 30.1 (L) 32.0 - 36.0 g/dL    RDW 15.4 (H) 11.5 - 14.5 %    Platelets 237 150 - 450 x10*3/uL   Magnesium   Result Value Ref Range    Magnesium 2.33 1.60 - 2.40 mg/dL   Hepatic Function Panel   Result Value Ref Range    Albumin 2.9 (L) 3.4 - 5.0 g/dL    Bilirubin, Total 0.6 0.0 - 1.2 mg/dL    Bilirubin, Direct 0.1 0.0 - 0.3 mg/dL    Alkaline Phosphatase 53 33 - 136 U/L    ALT <3 (L) 7 - 45 U/L    AST 17 9 - 39 U/L    Total Protein 5.7 (L) 6.4 - 8.2 g/dL   Phosphorus   Result Value Ref Range    Phosphorus 3.4 2.5 - 4.9 mg/dL   Basic Metabolic Panel   Result Value Ref Range    Glucose 99 74 - 99 mg/dL    Sodium 136 136 - 145 mmol/L    Potassium 4.2 3.5 - 5.3 mmol/L    Chloride 99 98 - 107 mmol/L    Bicarbonate 27 21 - 32 mmol/L    Anion Gap 14 10 - 20 mmol/L    Urea Nitrogen 56 (H) 6 - 23 mg/dL    Creatinine 1.94 (H) 0.50 - 1.05 mg/dL    eGFR 24 (L) >60 mL/min/1.73m*2    Calcium 8.4 (L) 8.6 - 10.3 mg/dL   POCT GLUCOSE   Result Value Ref Range    POCT Glucose 109 (H) 74 - 99 mg/dL   POCT GLUCOSE   Result Value Ref Range    POCT Glucose 161 (H) 74 - 99 mg/dL      Scheduled medications  brimonidine, 1 drop, Left Eye, BID  cefTRIAXone, 1 g, intravenous, q24h  cholecalciferol, 5,000 Units, oral, Daily  dorzolamide-timoloL, 1 drop, Left Eye, BID  [START ON 1/4/2025] ferrous sulfate (325 mg ferrous sulfate), 65 mg of iron, oral, BID  [Held by provider] furosemide, 40 mg, oral, Daily  insulin lispro, 0-10 Units, subcutaneous, Before meals & nightly  insulin lispro protamin-lispro, 15 Units, subcutaneous, BID AC  iron sucrose, 300 mg, intravenous, Nightly  lidocaine, 2 patch, transdermal, Nightly  rivaroxaban, 10 mg, oral, Daily  rosuvastatin, 5 mg, oral, q PM      Continuous medications     PRN medications  PRN medications: acetaminophen **OR** acetaminophen **OR** acetaminophen, dextrose, dextrose, glucagon, glucagon, melatonin, morphine, ondansetron, oxyCODONE, oxyCODONE, polyethylene glycol    Assessment/Plan   Assessment & Plan  Closed fracture of left hip, initial encounter    CKD (chronic kidney disease), stage IV (Multi)    Fall    Elevated troponin level    Neutrophilic leukocytosis    Postop day 3 of left hemiarthroplasty  Physical and Occupational Therapy are on consult  Weightbearing as tolerated with walker  Patient is on Xarelto which will cover for VTE prophylaxis  Labs reviewed  Multimodal pain regimen  Home medications ordered for chronic medical conditions as appropriate  Bowel regimen  Encourage incentive spirometry  Discharge planning after seen by therapy  Follow-up with Dr. Vega in 2 weeks as directed    Discussed care plan with patient and they verbalized understanding  Thank you for allowing us to participate in this patient's care  Okay for discharge from orthopedic standpoint when medically stable, please reach out for any further questions or concerns          I spent 20 minutes in the professional and overall care of this patient.      Daiana Navarro PA-C

## 2025-01-02 NOTE — PROGRESS NOTES
Dena Short is a 89 y.o. female on day 4 of admission presenting with Closed fracture of left hip, initial encounter.      Subjective   89 y.o. female   PMH; CAD s/p cardiac cath with previous stents, history of NSTEMI, chronic diastolic heart failure [LVEF 55 to 60% in May 2021], paroxysmal atrial fibrillation on Xarelto s/p cardioversion but rate control medications/antiarrhythmics discontinued by patient, history of cardiomyopathy, sinus arrhythmia, PAD s/p stenting right SFA-popliteal artery and angioplasty right PT and peroneal arteries, CKD 4, and IDDM 2    Patient is feeling well, no N/V, no CP or SOB.          Objective          Vitals 24HR  Heart Rate:  [63-91]   Temp:  [36.4 °C (97.5 °F)-37.6 °C (99.7 °F)]   Resp:  [17-18]   BP: ()/(33-73)   SpO2:  [95 %-97 %]     Intake/Output last 3 Shifts:    Intake/Output Summary (Last 24 hours) at 1/2/2025 1650  Last data filed at 1/2/2025 1502  Gross per 24 hour   Intake 3190 ml   Output 500 ml   Net 2690 ml       Physical Exam  GENERAL: Alert, no distress, cooperative  SKIN: Skin color, texture, turgor normal. No rashes or lesions.  EYES: PERRLA, EOMI  HEENT: Head: Normocephalic  Neck: supple and no adenopathy  LUNGS: Lungs clear to auscultation. Good diaphragmatic excursion.  CARDIAC: Normal S1 and S2; no rubs, murmurs, or gallops  ABDOMEN: Abdomen soft, non-tender. BS normal. No masses or organomegaly.  EXTREMITIES: No ulcers, Extremities normal. No deformities, edema, clubbing or skin discoloration.  NEURO: Cranial nerves II-XII intact, no focal deficit.     Relevant Results             Scheduled medications  acetaminophen, 975 mg, oral, q8h  brimonidine, 1 drop, Left Eye, BID  cefTRIAXone, 1 g, intravenous, q24h  cholecalciferol, 5,000 Units, oral, Daily  dorzolamide-timoloL, 1 drop, Left Eye, BID  [START ON 1/4/2025] ferrous sulfate (325 mg ferrous sulfate), 65 mg of iron, oral, BID  [Held by provider] furosemide, 40 mg, oral, Daily  insulin lispro,  0-10 Units, subcutaneous, Before meals & nightly  insulin lispro protamin-lispro, 15 Units, subcutaneous, BID AC  iron sucrose, 300 mg, intravenous, Nightly  lidocaine, 2 patch, transdermal, Nightly  rivaroxaban, 10 mg, oral, Daily  rosuvastatin, 5 mg, oral, q PM      Continuous medications  lactated Ringer's, 125 mL/hr, Last Rate: 125 mL/hr (01/02/25 1900)      PRN medications  PRN medications: dextrose, dextrose, glucagon, glucagon, melatonin, ondansetron, oxyCODONE, polyethylene glycol  Results for orders placed or performed during the hospital encounter of 12/29/24 (from the past 24 hours)   CBC   Result Value Ref Range    WBC 10.9 4.4 - 11.3 x10*3/uL    nRBC 0.0 0.0 - 0.0 /100 WBCs    RBC 3.69 (L) 4.00 - 5.20 x10*6/uL    Hemoglobin 9.6 (L) 12.0 - 16.0 g/dL    Hematocrit 31.9 (L) 36.0 - 46.0 %    MCV 86 80 - 100 fL    MCH 26.0 26.0 - 34.0 pg    MCHC 30.1 (L) 32.0 - 36.0 g/dL    RDW 15.4 (H) 11.5 - 14.5 %    Platelets 237 150 - 450 x10*3/uL   Magnesium   Result Value Ref Range    Magnesium 2.33 1.60 - 2.40 mg/dL   Hepatic Function Panel   Result Value Ref Range    Albumin 2.9 (L) 3.4 - 5.0 g/dL    Bilirubin, Total 0.6 0.0 - 1.2 mg/dL    Bilirubin, Direct 0.1 0.0 - 0.3 mg/dL    Alkaline Phosphatase 53 33 - 136 U/L    ALT <3 (L) 7 - 45 U/L    AST 17 9 - 39 U/L    Total Protein 5.7 (L) 6.4 - 8.2 g/dL   Phosphorus   Result Value Ref Range    Phosphorus 3.4 2.5 - 4.9 mg/dL   Basic Metabolic Panel   Result Value Ref Range    Glucose 99 74 - 99 mg/dL    Sodium 136 136 - 145 mmol/L    Potassium 4.2 3.5 - 5.3 mmol/L    Chloride 99 98 - 107 mmol/L    Bicarbonate 27 21 - 32 mmol/L    Anion Gap 14 10 - 20 mmol/L    Urea Nitrogen 56 (H) 6 - 23 mg/dL    Creatinine 1.94 (H) 0.50 - 1.05 mg/dL    eGFR 24 (L) >60 mL/min/1.73m*2    Calcium 8.4 (L) 8.6 - 10.3 mg/dL   POCT GLUCOSE   Result Value Ref Range    POCT Glucose 109 (H) 74 - 99 mg/dL   POCT GLUCOSE   Result Value Ref Range    POCT Glucose 161 (H) 74 - 99 mg/dL   POCT GLUCOSE    Result Value Ref Range    POCT Glucose 82 74 - 99 mg/dL   Urinalysis with Reflex Culture and Microscopic   Result Value Ref Range    Color, Urine Yellow Light-Yellow, Yellow, Dark-Yellow    Appearance, Urine Clear Clear    Specific Gravity, Urine 1.019 1.005 - 1.035    pH, Urine 5.0 5.0, 5.5, 6.0, 6.5, 7.0, 7.5, 8.0    Protein, Urine 20 (TRACE) NEGATIVE, 10 (TRACE), 20 (TRACE) mg/dL    Glucose, Urine Normal Normal mg/dL    Blood, Urine 0.03 (TRACE) (A) NEGATIVE    Ketones, Urine NEGATIVE NEGATIVE mg/dL    Bilirubin, Urine NEGATIVE NEGATIVE    Urobilinogen, Urine Normal Normal mg/dL    Nitrite, Urine NEGATIVE NEGATIVE    Leukocyte Esterase, Urine 250 Jem/µL (A) NEGATIVE   Microscopic Only, Urine   Result Value Ref Range    WBC, Urine 21-50 (A) 1-5, NONE /HPF    WBC Clumps, Urine RARE Reference range not established. /HPF    RBC, Urine 3-5 NONE, 1-2, 3-5 /HPF    Mucus, Urine FEW Reference range not established. /LPF   POCT GLUCOSE   Result Value Ref Range    POCT Glucose 78 74 - 99 mg/dL         Assessment/Plan        Assessment & Plan  Closed fracture of left hip, initial encounter    CKD (chronic kidney disease), stage IV (Multi)    Fall    Elevated troponin level    Neutrophilic leukocytosis      // ALESIA on CKD 3b.   - Baseline Cr; 1.3-1.7 back to 2019 with baseline GFR in the 3B range.   - Cr on admission; 1.35   - Offending meds; hold losartan and lasix during ALESIA.   - IV contrast; none.   - Hemodynamics; /84 on 12/30, 84/42 on 1/1.   - Check UA; trace blood, trace protein, LE.   - Check urine electrolytes;   - Check UPCR;   - Check renal US; pending.   - Avoid nephrotoxic medications including NSAIDs and IV contrast if possible.   - Dose medication to eGFR.   - Etiology is likely; hemodynamic with fluctuating BP and decreased renal perfusion with possible ATN.   - improving with IVF, ok to stop with improved PO intake and stabilizing BP.         // Anemia of CKD   - Hgb at goal.   - Medications; none.       // CKD MBD.   - PTH.   - Ca and phos at goal.   - Medications; none.      // Metabolic acidosis   None.      // HTN   // Hypotension.         // CAD s/p cardiac cath with previous stents, history of NSTEMI,   // chronic diastolic heart failure [LVEF 55 to 60% in May 2021],   // paroxysmal atrial fibrillation on Xarelto s/p cardioversion but rate control medications/antiarrhythmics discontinued by patient,   // PAD s/p stenting right SFA-popliteal artery and angioplasty right PT and peroneal arteries,   // IDDM 2        Alejandro Richards MD

## 2025-01-03 LAB
ANION GAP SERPL CALC-SCNC: 13 MMOL/L (ref 10–20)
BUN SERPL-MCNC: 55 MG/DL (ref 6–23)
CALCIUM SERPL-MCNC: 8.2 MG/DL (ref 8.6–10.3)
CHLORIDE SERPL-SCNC: 101 MMOL/L (ref 98–107)
CO2 SERPL-SCNC: 27 MMOL/L (ref 21–32)
CREAT SERPL-MCNC: 1.75 MG/DL (ref 0.5–1.05)
EGFRCR SERPLBLD CKD-EPI 2021: 27 ML/MIN/1.73M*2
ERYTHROCYTE [DISTWIDTH] IN BLOOD BY AUTOMATED COUNT: 15.8 % (ref 11.5–14.5)
GLUCOSE BLD MANUAL STRIP-MCNC: 108 MG/DL (ref 74–99)
GLUCOSE BLD MANUAL STRIP-MCNC: 171 MG/DL (ref 74–99)
GLUCOSE BLD MANUAL STRIP-MCNC: 67 MG/DL (ref 74–99)
GLUCOSE BLD MANUAL STRIP-MCNC: 84 MG/DL (ref 74–99)
GLUCOSE BLD MANUAL STRIP-MCNC: 94 MG/DL (ref 74–99)
GLUCOSE SERPL-MCNC: 119 MG/DL (ref 74–99)
HCT VFR BLD AUTO: 31.8 % (ref 36–46)
HGB BLD-MCNC: 9.6 G/DL (ref 12–16)
HOLD SPECIMEN: NORMAL
MCH RBC QN AUTO: 26.6 PG (ref 26–34)
MCHC RBC AUTO-ENTMCNC: 30.2 G/DL (ref 32–36)
MCV RBC AUTO: 88 FL (ref 80–100)
NRBC BLD-RTO: 0.2 /100 WBCS (ref 0–0)
PLATELET # BLD AUTO: 234 X10*3/UL (ref 150–450)
POTASSIUM SERPL-SCNC: 4 MMOL/L (ref 3.5–5.3)
RBC # BLD AUTO: 3.61 X10*6/UL (ref 4–5.2)
SODIUM SERPL-SCNC: 137 MMOL/L (ref 136–145)
WBC # BLD AUTO: 8.6 X10*3/UL (ref 4.4–11.3)

## 2025-01-03 PROCEDURE — 2500000002 HC RX 250 W HCPCS SELF ADMINISTERED DRUGS (ALT 637 FOR MEDICARE OP, ALT 636 FOR OP/ED): Performed by: NURSE PRACTITIONER

## 2025-01-03 PROCEDURE — 2500000004 HC RX 250 GENERAL PHARMACY W/ HCPCS (ALT 636 FOR OP/ED): Performed by: NURSE PRACTITIONER

## 2025-01-03 PROCEDURE — 2500000005 HC RX 250 GENERAL PHARMACY W/O HCPCS: Performed by: NURSE PRACTITIONER

## 2025-01-03 PROCEDURE — 82947 ASSAY GLUCOSE BLOOD QUANT: CPT

## 2025-01-03 PROCEDURE — 2500000001 HC RX 250 WO HCPCS SELF ADMINISTERED DRUGS (ALT 637 FOR MEDICARE OP): Performed by: NURSE PRACTITIONER

## 2025-01-03 PROCEDURE — 85027 COMPLETE CBC AUTOMATED: CPT | Performed by: NURSE PRACTITIONER

## 2025-01-03 PROCEDURE — 80048 BASIC METABOLIC PNL TOTAL CA: CPT | Performed by: NURSE PRACTITIONER

## 2025-01-03 PROCEDURE — 36415 COLL VENOUS BLD VENIPUNCTURE: CPT | Performed by: NURSE PRACTITIONER

## 2025-01-03 PROCEDURE — 1200000002 HC GENERAL ROOM WITH TELEMETRY DAILY

## 2025-01-03 RX ADMIN — ACETAMINOPHEN 975 MG: 325 TABLET ORAL at 21:49

## 2025-01-03 RX ADMIN — LIDOCAINE 4% 2 PATCH: 40 PATCH TOPICAL at 21:33

## 2025-01-03 RX ADMIN — IRON SUCROSE 300 MG: 20 INJECTION, SOLUTION INTRAVENOUS at 21:50

## 2025-01-03 RX ADMIN — SODIUM CHLORIDE, POTASSIUM CHLORIDE, SODIUM LACTATE AND CALCIUM CHLORIDE 250 ML: 600; 310; 30; 20 INJECTION, SOLUTION INTRAVENOUS at 12:31

## 2025-01-03 RX ADMIN — SODIUM CHLORIDE, POTASSIUM CHLORIDE, SODIUM LACTATE AND CALCIUM CHLORIDE 125 ML/HR: 600; 310; 30; 20 INJECTION, SOLUTION INTRAVENOUS at 06:10

## 2025-01-03 RX ADMIN — ROSUVASTATIN CALCIUM 5 MG: 5 TABLET, FILM COATED ORAL at 21:49

## 2025-01-03 RX ADMIN — BRIMONIDINE TARTRATE 1 DROP: 2 SOLUTION/ DROPS OPHTHALMIC at 00:41

## 2025-01-03 RX ADMIN — INSULIN LISPRO 15 UNITS: 100 INJECTION, SUSPENSION SUBCUTANEOUS at 08:35

## 2025-01-03 RX ADMIN — SODIUM CHLORIDE, POTASSIUM CHLORIDE, SODIUM LACTATE AND CALCIUM CHLORIDE 250 ML: 600; 310; 30; 20 INJECTION, SOLUTION INTRAVENOUS at 10:28

## 2025-01-03 RX ADMIN — ACETAMINOPHEN 975 MG: 325 TABLET ORAL at 06:10

## 2025-01-03 RX ADMIN — DORZOLAMIDE HYDROCHLORIDE AND TIMOLOL MALEATE 1 DROP: 20; 5 SOLUTION/ DROPS OPHTHALMIC at 21:37

## 2025-01-03 RX ADMIN — INSULIN LISPRO 2 UNITS: 100 INJECTION, SOLUTION INTRAVENOUS; SUBCUTANEOUS at 21:37

## 2025-01-03 RX ADMIN — RIVAROXABAN 10 MG: 10 TABLET, FILM COATED ORAL at 21:49

## 2025-01-03 RX ADMIN — DORZOLAMIDE HYDROCHLORIDE AND TIMOLOL MALEATE 1 DROP: 20; 5 SOLUTION/ DROPS OPHTHALMIC at 00:41

## 2025-01-03 RX ADMIN — DORZOLAMIDE HYDROCHLORIDE AND TIMOLOL MALEATE 1 DROP: 20; 5 SOLUTION/ DROPS OPHTHALMIC at 08:32

## 2025-01-03 RX ADMIN — CEFTRIAXONE SODIUM 1 G: 1 INJECTION, SOLUTION INTRAVENOUS at 20:37

## 2025-01-03 RX ADMIN — BRIMONIDINE TARTRATE 1 DROP: 2 SOLUTION/ DROPS OPHTHALMIC at 08:32

## 2025-01-03 RX ADMIN — ACETAMINOPHEN 975 MG: 325 TABLET ORAL at 14:34

## 2025-01-03 RX ADMIN — BRIMONIDINE TARTRATE 1 DROP: 2 SOLUTION/ DROPS OPHTHALMIC at 21:32

## 2025-01-03 RX ADMIN — CHOLECALCIFEROL TAB 125 MCG (5000 UNIT) 5000 UNITS: 125 TAB at 08:32

## 2025-01-03 ASSESSMENT — PAIN SCALES - GENERAL
PAINLEVEL_OUTOF10: 4
PAINLEVEL_OUTOF10: 3
PAINLEVEL_OUTOF10: 4

## 2025-01-03 ASSESSMENT — PAIN - FUNCTIONAL ASSESSMENT
PAIN_FUNCTIONAL_ASSESSMENT: 0-10

## 2025-01-03 NOTE — CARE PLAN
The patient's goals for the shift include      The clinical goals for the shift include pt will participate in q2hour turns throughout this shift    Over the shift, the patient did  make progress toward the following goals. Pt is alert and oriented x 3 with occasionally forgetful. Pt has been able to sleep in long intervals, allow staff to turn her [id number] dressing in place, has had small amount of urine which she was incontinent of has been bladder scan q4hour with highest 155ml pt has remained safe bed alarm on

## 2025-01-03 NOTE — NURSING NOTE
Pt has not voided since AM, bladder scan completed, 535mls noted.  Straight cath completed, 500mls stephan urine noted.

## 2025-01-03 NOTE — PROGRESS NOTES
Occupational Therapy                 Therapy Communication Note    Patient Name: Dena Short  MRN: [id number]  Department: [address]  Room: [facility]4114-A  Today's Date: 1/3/2025     Discipline: Occupational Therapy          Missed Visit Reason:      Missed Time: Attempt    Comment:  Attempted to see patient this day, however, patient's nurse requested no treatment secondary to patient's medical condition.  Patient presenting with low BP and fatigue.

## 2025-01-03 NOTE — DISCHARGE SUMMARY
Discharge Diagnosis  Closed fracture of left hip, initial encounter    Issues Requiring Follow-Up  Discharge to skilled nursing facility    Discharge Meds     Medication List      ASK your doctor about these medications     brimonidine 0.2 % ophthalmic solution; Commonly known as: AlphaGAN   dorzolamide-timoloL 22.3-6.8 mg/mL ophthalmic solution; Commonly known   as: Cosopt   furosemide 40 mg tablet; Commonly known as: Lasix; Take 1 tablet (40 mg)   by mouth once daily.   insulin asp prt-insulin aspart 100 unit/mL (70-30) injection; Commonly   known as: NovoLOG Mix 70-30   losartan 50 mg tablet; Commonly known as: Cozaar; Take 1 tablet by mouth   once daily   rosuvastatin 5 mg tablet; Commonly known as: Crestor   Xarelto 10 mg tablet; Generic drug: rivaroxaban; Take 1 tablet by mouth   once daily       Test Results Pending At Discharge  Pending Labs       Order Current Status    Urine Culture In process            Hospital Course   Patient was admitted to the hospital status post fall and fracture of the left hip patient underwent open reduction internal fixation of the left hip patient developed ALESIA postoperatively and treated with IV fluid waiting for blood work from today if improving patient can be discharged to skilled nursing facility continue her physical therapy hold Lasix and ARB    Pertinent Physical Exam At Time of Discharge  Physical Exam  HENT:      Right Ear: External ear normal.      Left Ear: External ear normal.      Mouth/Throat:      Mouth: Mucous membranes are moist.   Cardiovascular:      Rate and Rhythm: Normal rate and regular rhythm.      Heart sounds: No murmur heard.     No friction rub. No gallop.   Pulmonary:      Effort: No accessory muscle usage or respiratory distress.      Breath sounds: No stridor. No wheezing or rhonchi.   Chest:      Chest wall: No tenderness.   Abdominal:      General: There is no distension.      Palpations: There is no mass.      Tenderness: There is no  abdominal tenderness. There is no guarding or rebound.   Musculoskeletal:         General: No deformity or signs of injury.      Cervical back: No rigidity or tenderness. Normal range of motion.      Right lower leg: No edema.      Left lower leg: No edema.      Comments: Limited range of motion over the left hip   Skin:     Coloration: Skin is not jaundiced or pale.      Findings: No lesion.   Neurological:      General: No focal deficit present.      Mental Status: She is alert, oriented to person, place, and time and easily aroused.      Cranial Nerves: No cranial nerve deficit.      Sensory: No sensory deficit.      Motor: No weakness.     1/4/2025 doing well no change in condition kidney numbers improved significantly patient having some hypoglycemia from using insulin specially she is not eating well I will stop her insulin 70/30 discharge on sliding scale follow-up at the nursing home on her blood glucose follow-up with orthopedic related to left hip fracture  1/5/2025 patient is doing well no change in condition blood work was reviewed patient was examined nurse was interviewed waiting on insurance approval to travel to skilled nursing facility      1/6/2025 doing well no change in condition waiting for insurance approval to transfer to skilled nursing facility blood work was reviewed nurses was interviewed      1/7/2025 no change in condition waiting for insurance approval to transfer to skilled nursing facility patient seen and examined blood work was reviewed  Outpatient Follow-Up  Future Appointments   Date Time Provider Department Center   2/4/2025  1:30 PM Tracy M Schwab, APRN-CNP [id number] Ricardo Martínez MD

## 2025-01-03 NOTE — NURSING NOTE
Pt sleepy but will arouse.  Neuro's WNL for pt.  Update Sabino Ryan NP on pts condition and current BP

## 2025-01-03 NOTE — PROGRESS NOTES
Physical Therapy                 Therapy Communication Note    Patient Name: Dena Short  MRN: [id number]  Department: [facility]  Room: [address] [place], [place] [address]  Today's Date: 1/3/2025     Discipline: Physical Therapy          Missed Visit Reason:      Missed Time: Attempt    Comment:Patient on hold this day secondary to low B/PO and lethergy

## 2025-01-03 NOTE — PROGRESS NOTES
Dena Short is a 89 y.o. female on day 5 of admission presenting with Closed fracture of left hip, initial encounter.      Subjective   89 y.o. female   PMH; CAD s/p cardiac cath with previous stents, history of NSTEMI, chronic diastolic heart failure [LVEF 55 to 60% in May 2021], paroxysmal atrial fibrillation on Xarelto s/p cardioversion but rate control medications/antiarrhythmics discontinued by patient, history of cardiomyopathy, sinus arrhythmia, PAD s/p stenting right SFA-popliteal artery and angioplasty right PT and peroneal arteries, CKD 4, and IDDM 2    Patient is feeling well, no N/V, no CP or SOB.          Objective          Vitals 24HR  Heart Rate:  [61-84]   Temp:  [36 °C (96.8 °F)-36.9 °C (98.4 °F)]   Resp:  [14-18]   BP: ()/(48-71)   SpO2:  [93 %-99 %]     Intake/Output last 3 Shifts:    Intake/Output Summary (Last 24 hours) at 1/3/2025 1718  Last data filed at 1/3/2025 1657  Gross per 24 hour   Intake 3983.75 ml   Output 500 ml   Net 3483.75 ml       Physical Exam  GENERAL: Alert, no distress, cooperative  SKIN: Skin color, texture, turgor normal. No rashes or lesions.  EYES: PERRLA, EOMI  HEENT: Head: Normocephalic  Neck: supple and no adenopathy  LUNGS: Lungs clear to auscultation. Good diaphragmatic excursion.  CARDIAC: Normal S1 and S2; no rubs, murmurs, or gallops  ABDOMEN: Abdomen soft, non-tender. BS normal. No masses or organomegaly.  EXTREMITIES: No ulcers, Extremities normal. No deformities, edema, clubbing or skin discoloration.  NEURO: Cranial nerves II-XII intact, no focal deficit.     Relevant Results             Scheduled medications  acetaminophen, 975 mg, oral, q8h  brimonidine, 1 drop, Left Eye, BID  cefTRIAXone, 1 g, intravenous, q24h  cholecalciferol, 5,000 Units, oral, Daily  dorzolamide-timoloL, 1 drop, Left Eye, BID  [START ON 1/4/2025] ferrous sulfate (325 mg ferrous sulfate), 65 mg of iron, oral, BID  [Held by provider] furosemide, 40 mg, oral, Daily  insulin  lispro, 0-10 Units, subcutaneous, Before meals & nightly  insulin lispro protamin-lispro, 15 Units, subcutaneous, BID AC  iron sucrose, 300 mg, intravenous, Nightly  lidocaine, 2 patch, transdermal, Nightly  rivaroxaban, 10 mg, oral, Daily  rosuvastatin, 5 mg, oral, q PM      Continuous medications       PRN medications  PRN medications: dextrose, dextrose, glucagon, glucagon, melatonin, ondansetron, oxyCODONE, polyethylene glycol  Results for orders placed or performed during the hospital encounter of 12/29/24 (from the past 24 hours)   POCT GLUCOSE   Result Value Ref Range    POCT Glucose 82 74 - 99 mg/dL   Protein, Urine Random   Result Value Ref Range    Total Protein, Urine Random 43 (H) 5 - 24 mg/dL    Creatinine, Urine Random 105.6 20.0 - 320.0 mg/dL    T. Protein/Creatinine Ratio 0.41 (H) 0.00 - 0.17 mg/mg Creat   Electrolyte Panel, Urine   Result Value Ref Range    Sodium, Urine Random 10 mmol/L    Sodium/Creatinine Ratio 9 Not established. mmol/g Creat    Potassium, Urine Random 40 mmol/L    Potassium/Creatinine Ratio 38 Not established mmol/g Creat    Chloride, Urine Random <15 mmol/L    Chloride/Creatinine Ratio      Creatinine, Urine Random 105.6 20.0 - 320.0 mg/dL   Urinalysis with Reflex Culture and Microscopic   Result Value Ref Range    Color, Urine Yellow Light-Yellow, Yellow, Dark-Yellow    Appearance, Urine Clear Clear    Specific Gravity, Urine 1.019 1.005 - 1.035    pH, Urine 5.0 5.0, 5.5, 6.0, 6.5, 7.0, 7.5, 8.0    Protein, Urine 20 (TRACE) NEGATIVE, 10 (TRACE), 20 (TRACE) mg/dL    Glucose, Urine Normal Normal mg/dL    Blood, Urine 0.03 (TRACE) (A) NEGATIVE    Ketones, Urine NEGATIVE NEGATIVE mg/dL    Bilirubin, Urine NEGATIVE NEGATIVE    Urobilinogen, Urine Normal Normal mg/dL    Nitrite, Urine NEGATIVE NEGATIVE    Leukocyte Esterase, Urine 250 Jem/µL (A) NEGATIVE   Extra Urine Gray Tube   Result Value Ref Range    Extra Tube Hold for add-ons.    Microscopic Only, Urine   Result Value Ref  Range    WBC, Urine 21-50 (A) 1-5, NONE /HPF    WBC Clumps, Urine RARE Reference range not established. /HPF    RBC, Urine 3-5 NONE, 1-2, 3-5 /HPF    Mucus, Urine FEW Reference range not established. /LPF   POCT GLUCOSE   Result Value Ref Range    POCT Glucose 78 74 - 99 mg/dL   CBC   Result Value Ref Range    WBC 8.6 4.4 - 11.3 x10*3/uL    nRBC 0.2 (H) 0.0 - 0.0 /100 WBCs    RBC 3.61 (L) 4.00 - 5.20 x10*6/uL    Hemoglobin 9.6 (L) 12.0 - 16.0 g/dL    Hematocrit 31.8 (L) 36.0 - 46.0 %    MCV 88 80 - 100 fL    MCH 26.6 26.0 - 34.0 pg    MCHC 30.2 (L) 32.0 - 36.0 g/dL    RDW 15.8 (H) 11.5 - 14.5 %    Platelets 234 150 - 450 x10*3/uL   Basic metabolic panel   Result Value Ref Range    Glucose 119 (H) 74 - 99 mg/dL    Sodium 137 136 - 145 mmol/L    Potassium 4.0 3.5 - 5.3 mmol/L    Chloride 101 98 - 107 mmol/L    Bicarbonate 27 21 - 32 mmol/L    Anion Gap 13 10 - 20 mmol/L    Urea Nitrogen 55 (H) 6 - 23 mg/dL    Creatinine 1.75 (H) 0.50 - 1.05 mg/dL    eGFR 27 (L) >60 mL/min/1.73m*2    Calcium 8.2 (L) 8.6 - 10.3 mg/dL   POCT GLUCOSE   Result Value Ref Range    POCT Glucose 94 74 - 99 mg/dL   POCT GLUCOSE   Result Value Ref Range    POCT Glucose 84 74 - 99 mg/dL   POCT GLUCOSE   Result Value Ref Range    POCT Glucose 67 (L) 74 - 99 mg/dL   POCT GLUCOSE   Result Value Ref Range    POCT Glucose 108 (H) 74 - 99 mg/dL         Assessment/Plan        Assessment & Plan  Closed fracture of left hip, initial encounter    CKD (chronic kidney disease), stage IV (Multi)    Fall    Elevated troponin level    Neutrophilic leukocytosis      // ALESIA on CKD 3b.   - Baseline Cr; 1.3-1.7 back to 2019 with baseline GFR in the 3B range.   - Cr on admission; 1.35   - Offending meds; hold losartan and lasix during ALESIA.   - IV contrast; none.   - Hemodynamics; /84 on 12/30, 84/42 on 1/1.   - Check UA; trace blood, trace protein, LE.   - Check urine electrolytes;   - Check UPCR; 0.41   - Check renal US;  medical renal disease (R>L)   - Avoid  nephrotoxic medications including NSAIDs and IV contrast if possible.   - Dose medication to eGFR.   - Etiology is likely; hemodynamic with fluctuating BP and decreased renal perfusion with possible ATN.   - improving with hydration  - stabilizing BP. (Still some dips)     // Proteinuria   - UPCR 0.41    - IDDM     // Anemia of CKD   - Hgb at goal.   - Medications; none.      // CKD MBD.   - PTH. 109   - 25D; 27   - Ca and phos at goal.   - Medications; none.      // Metabolic acidosis   None.      // HTN   // Hypotension.         // CAD s/p cardiac cath with previous stents, history of NSTEMI,   // chronic diastolic heart failure [LVEF 55 to 60% in May 2021],   // paroxysmal atrial fibrillation on Xarelto s/p cardioversion but rate control medications/antiarrhythmics discontinued by patient,   // PAD s/p stenting right SFA-popliteal artery and angioplasty right PT and peroneal arteries,   // IDDM 2        Alejandro Richards MD

## 2025-01-04 LAB
ANION GAP SERPL CALC-SCNC: 12 MMOL/L (ref 10–20)
BACTERIA UR CULT: NO GROWTH
BUN SERPL-MCNC: 44 MG/DL (ref 6–23)
CALCIUM SERPL-MCNC: 8.3 MG/DL (ref 8.6–10.3)
CHLORIDE SERPL-SCNC: 103 MMOL/L (ref 98–107)
CO2 SERPL-SCNC: 26 MMOL/L (ref 21–32)
CREAT SERPL-MCNC: 1.37 MG/DL (ref 0.5–1.05)
EGFRCR SERPLBLD CKD-EPI 2021: 36 ML/MIN/1.73M*2
ERYTHROCYTE [DISTWIDTH] IN BLOOD BY AUTOMATED COUNT: 16 % (ref 11.5–14.5)
GLUCOSE BLD MANUAL STRIP-MCNC: 126 MG/DL (ref 74–99)
GLUCOSE BLD MANUAL STRIP-MCNC: 162 MG/DL (ref 74–99)
GLUCOSE BLD MANUAL STRIP-MCNC: 235 MG/DL (ref 74–99)
GLUCOSE BLD MANUAL STRIP-MCNC: 96 MG/DL (ref 74–99)
GLUCOSE SERPL-MCNC: 95 MG/DL (ref 74–99)
HCT VFR BLD AUTO: 34.2 % (ref 36–46)
HGB BLD-MCNC: 10.1 G/DL (ref 12–16)
MCH RBC QN AUTO: 26.4 PG (ref 26–34)
MCHC RBC AUTO-ENTMCNC: 29.5 G/DL (ref 32–36)
MCV RBC AUTO: 90 FL (ref 80–100)
NRBC BLD-RTO: 0.2 /100 WBCS (ref 0–0)
PLATELET # BLD AUTO: 261 X10*3/UL (ref 150–450)
POTASSIUM SERPL-SCNC: 4.2 MMOL/L (ref 3.5–5.3)
RBC # BLD AUTO: 3.82 X10*6/UL (ref 4–5.2)
SODIUM SERPL-SCNC: 137 MMOL/L (ref 136–145)
WBC # BLD AUTO: 8.7 X10*3/UL (ref 4.4–11.3)

## 2025-01-04 PROCEDURE — 85027 COMPLETE CBC AUTOMATED: CPT | Performed by: NURSE PRACTITIONER

## 2025-01-04 PROCEDURE — 36415 COLL VENOUS BLD VENIPUNCTURE: CPT | Performed by: NURSE PRACTITIONER

## 2025-01-04 PROCEDURE — 2500000002 HC RX 250 W HCPCS SELF ADMINISTERED DRUGS (ALT 637 FOR MEDICARE OP, ALT 636 FOR OP/ED): Performed by: NURSE PRACTITIONER

## 2025-01-04 PROCEDURE — 82374 ASSAY BLOOD CARBON DIOXIDE: CPT | Performed by: NURSE PRACTITIONER

## 2025-01-04 PROCEDURE — 2500000001 HC RX 250 WO HCPCS SELF ADMINISTERED DRUGS (ALT 637 FOR MEDICARE OP): Performed by: NURSE PRACTITIONER

## 2025-01-04 PROCEDURE — 97110 THERAPEUTIC EXERCISES: CPT | Mod: GP,CQ

## 2025-01-04 PROCEDURE — 97535 SELF CARE MNGMENT TRAINING: CPT | Mod: GO,CO

## 2025-01-04 PROCEDURE — 97530 THERAPEUTIC ACTIVITIES: CPT | Mod: GO,CO

## 2025-01-04 PROCEDURE — 82947 ASSAY GLUCOSE BLOOD QUANT: CPT

## 2025-01-04 PROCEDURE — 2500000005 HC RX 250 GENERAL PHARMACY W/O HCPCS: Performed by: NURSE PRACTITIONER

## 2025-01-04 PROCEDURE — 97116 GAIT TRAINING THERAPY: CPT | Mod: GP,CQ

## 2025-01-04 PROCEDURE — 1200000002 HC GENERAL ROOM WITH TELEMETRY DAILY

## 2025-01-04 RX ORDER — INSULIN LISPRO 100 [IU]/ML
0-10 INJECTION, SOLUTION INTRAVENOUS; SUBCUTANEOUS
Start: 2025-01-04

## 2025-01-04 RX ORDER — POLYETHYLENE GLYCOL 3350 17 G/17G
17 POWDER, FOR SOLUTION ORAL DAILY PRN
Start: 2025-01-04

## 2025-01-04 RX ORDER — LOSARTAN POTASSIUM 50 MG/1
50 TABLET ORAL DAILY
Start: 2025-01-04

## 2025-01-04 RX ORDER — OXYCODONE HYDROCHLORIDE 5 MG/1
2.5 TABLET ORAL EVERY 4 HOURS PRN
Start: 2025-01-04 | End: 2025-01-11

## 2025-01-04 RX ORDER — LOSARTAN POTASSIUM 50 MG/1
50 TABLET ORAL DAILY
Status: DISCONTINUED | OUTPATIENT
Start: 2025-01-04 | End: 2025-01-07 | Stop reason: HOSPADM

## 2025-01-04 RX ORDER — LIDOCAINE 560 MG/1
2 PATCH PERCUTANEOUS; TOPICAL; TRANSDERMAL NIGHTLY
Start: 2025-01-04

## 2025-01-04 RX ORDER — ACETAMINOPHEN 325 MG/1
975 TABLET ORAL EVERY 8 HOURS
Start: 2025-01-04 | End: 2025-01-11

## 2025-01-04 RX ORDER — FERROUS SULFATE 325(65) MG
65 TABLET ORAL 2 TIMES DAILY
Start: 2025-01-04

## 2025-01-04 RX ORDER — ACETAMINOPHEN 500 MG
5000 TABLET ORAL DAILY
Start: 2025-01-05

## 2025-01-04 RX ADMIN — OXYCODONE HYDROCHLORIDE 2.5 MG: 5 TABLET ORAL at 04:05

## 2025-01-04 RX ADMIN — DORZOLAMIDE HYDROCHLORIDE AND TIMOLOL MALEATE 1 DROP: 20; 5 SOLUTION/ DROPS OPHTHALMIC at 20:01

## 2025-01-04 RX ADMIN — INSULIN LISPRO 2 UNITS: 100 INJECTION, SOLUTION INTRAVENOUS; SUBCUTANEOUS at 12:13

## 2025-01-04 RX ADMIN — ACETAMINOPHEN 975 MG: 325 TABLET ORAL at 23:44

## 2025-01-04 RX ADMIN — CHOLECALCIFEROL TAB 125 MCG (5000 UNIT) 5000 UNITS: 125 TAB at 09:09

## 2025-01-04 RX ADMIN — BRIMONIDINE TARTRATE 1 DROP: 2 SOLUTION/ DROPS OPHTHALMIC at 20:01

## 2025-01-04 RX ADMIN — DORZOLAMIDE HYDROCHLORIDE AND TIMOLOL MALEATE 1 DROP: 20; 5 SOLUTION/ DROPS OPHTHALMIC at 09:10

## 2025-01-04 RX ADMIN — FERROUS SULFATE TAB 325 MG (65 MG ELEMENTAL FE) 325 MG: 325 (65 FE) TAB at 20:02

## 2025-01-04 RX ADMIN — LOSARTAN POTASSIUM 50 MG: 50 TABLET, FILM COATED ORAL at 17:46

## 2025-01-04 RX ADMIN — RIVAROXABAN 10 MG: 10 TABLET, FILM COATED ORAL at 20:02

## 2025-01-04 RX ADMIN — FERROUS SULFATE TAB 325 MG (65 MG ELEMENTAL FE) 325 MG: 325 (65 FE) TAB at 09:09

## 2025-01-04 RX ADMIN — ROSUVASTATIN CALCIUM 5 MG: 5 TABLET, FILM COATED ORAL at 20:02

## 2025-01-04 RX ADMIN — LIDOCAINE 4% 2 PATCH: 40 PATCH TOPICAL at 20:01

## 2025-01-04 RX ADMIN — ACETAMINOPHEN 975 MG: 325 TABLET ORAL at 15:59

## 2025-01-04 RX ADMIN — BRIMONIDINE TARTRATE 1 DROP: 2 SOLUTION/ DROPS OPHTHALMIC at 09:10

## 2025-01-04 RX ADMIN — INSULIN LISPRO 4 UNITS: 100 INJECTION, SOLUTION INTRAVENOUS; SUBCUTANEOUS at 21:41

## 2025-01-04 RX ADMIN — ACETAMINOPHEN 975 MG: 325 TABLET ORAL at 05:54

## 2025-01-04 ASSESSMENT — PAIN - FUNCTIONAL ASSESSMENT
PAIN_FUNCTIONAL_ASSESSMENT: 0-10

## 2025-01-04 ASSESSMENT — PAIN SCALES - GENERAL
PAINLEVEL_OUTOF10: 2
PAINLEVEL_OUTOF10: 5 - MODERATE PAIN
PAINLEVEL_OUTOF10: 4
PAINLEVEL_OUTOF10: 3
PAINLEVEL_OUTOF10: 2
PAINLEVEL_OUTOF10: 3
PAINLEVEL_OUTOF10: 6

## 2025-01-04 ASSESSMENT — COGNITIVE AND FUNCTIONAL STATUS - GENERAL
MOVING FROM LYING ON BACK TO SITTING ON SIDE OF FLAT BED WITH BEDRAILS: A LOT
DAILY ACTIVITIY SCORE: 11
DRESSING REGULAR LOWER BODY CLOTHING: TOTAL
DRESSING REGULAR UPPER BODY CLOTHING: A LOT
HELP NEEDED FOR BATHING: A LOT
TOILETING: TOTAL
PERSONAL GROOMING: A LOT
MOBILITY SCORE: 11
MOVING TO AND FROM BED TO CHAIR: A LOT
TURNING FROM BACK TO SIDE WHILE IN FLAT BAD: A LOT
CLIMB 3 TO 5 STEPS WITH RAILING: TOTAL
EATING MEALS: A LITTLE
STANDING UP FROM CHAIR USING ARMS: A LOT
WALKING IN HOSPITAL ROOM: A LOT

## 2025-01-04 ASSESSMENT — ACTIVITIES OF DAILY LIVING (ADL): HOME_MANAGEMENT_TIME_ENTRY: 14

## 2025-01-04 NOTE — PROGRESS NOTES
Sent therapy notes to [facility] per their request from 11/2. Asked for clarification as to whether or not they submitted on their end.     1117  [facility] confirmed they did not start on their end as they did not have therapy notes. They will submit Monday morning.

## 2025-01-04 NOTE — PROGRESS NOTES
Physical Therapy    Physical Therapy    Physical Therapy Treatment    Patient Name: Dena Short  MRN: [id number]  Today's Date: 1/4/2025  Time Calculation  Start Time: 0754  Stop Time: 0832  Time Calculation (min): 38 min     4114/4114-A    Assessment/Plan   PT Assessment  PT Assessment Results: Decreased strength, Decreased range of motion, Decreased endurance, Impaired balance, Decreased mobility, Decreased safety awareness, Pain, Impaired judgement, Decreased cognition, Orthopedic restrictions  Rehab Prognosis: Good  End of Session Communication: Bedside nurse  End of Session Patient Position: Up in chair, Alarm on  PT Plan  Inpatient/Swing Bed or Outpatient: Inpatient  PT Plan  Treatment/Interventions: Bed mobility, Transfer training, Gait training, Balance training, Neuromuscular re-education, Strengthening, Endurance training, Range of motion, Therapeutic exercise, Therapeutic activity, Home exercise program  PT Plan: Ongoing PT  PT Frequency: Daily  PT Discharge Recommendations: Moderate intensity level of continued care  Equipment Recommended upon Discharge: Wheeled walker  PT Recommended Transfer Status: Assist x2  PT - OK to Discharge: Yes (     01/04/25 0754   PT  Visit   PT Received On 01/04/25   General   Reason for Referral recent surgery   Referred By Dr. Vega   Past Medical History Relevant to Rehab To hospital following fall. Pt found to have left hip fracture. Pt underwent left hip hemiarthroplasty 12/20/24. PMH: bradycardia, CAD, cardiomyopathy, CKD, DM, PAD, NSTEMI, cardiac cath, cardioversion, right hip hemiarthropasty   General Comment Patient in bed and with encouragement was agreeable to working with therapy.   Precautions   Medical Precautions Fall precautions   Post-Surgical Precautions Left hip precautions   Pain Assessment   Pain Assessment 0-10   0-10 (Numeric) Pain Score 3   Pain Location Hip   Pain Orientation Left   Therapeutic Exercise   Therapeutic Exercise Performed Yes    Therapeutic Exercise Activity 1 AP,  QS,  GS,  ,  LAQ,  ,  Hip Abduction x 10  each B  with emphasis on  L LE   Bed Mobility   Bed Mobility Yes   Bed Mobility 1   Bed Mobility 1 Supine to sitting   Level of Assistance 1 Maximum assistance  (x2)   Ambulation/Gait Training   Ambulation/Gait Training Performed Yes   Ambulation/Gait Training 1   Surface 1 Level tile   Device 1 Rolling walker   Gait Support Devices Gait belt   Assistance 1 Minimum assistance  (x2, 1 person for close chair follow)   Quality of Gait 1 Decreased step length;Inconsistent stride length;Forward flexed posture   Comments/Distance (ft) 1 8  (Patient demos flexed posturing shuffling steps needs encouragement to  progress  chair was brought up at 8')   Transfers   Transfer Yes   Transfer 1   Technique 1 Sit to stand;Stand to sit   Transfer Device 1 Walker   Transfer Level of Assistance 1 Moderate assistance  (x2)   PT Assessment   PT Assessment Results Decreased strength;Decreased range of motion;Decreased endurance;Impaired balance;Decreased mobility;Decreased safety awareness;Pain;Impaired judgement;Decreased cognition;Orthopedic restrictions   Rehab Prognosis Good   End of Session Communication Bedside nurse   End of Session Patient Position Up in chair;Alarm on   PT Plan   Inpatient/Swing Bed or Outpatient Inpatient     Outcome Me[facility]:  [facility]  Turning from your back to your side while in a flat bed without using bedrails: A lot  Moving from lying on your back to sitting on the side of a flat bed without using bedrails: A lot  Moving to and from bed to chair (including a wheelchair): A lot  Standing up from a chair using your arms (e.g. wheelchair or bedside chair): A lot  To walk in hospital room: A lot  Climbing 3-5 steps with railing: Total  Basic Mobility - Total Score: 11                             EDUCATION:     Education Documentation  No documentation found.  Education Comments  No comments  found.        GOALS:  Encounter Problems       Encounter Problems (Active)       PT Problem       Pt will perform bed mobility with mod A.  (Progressing)       Start:  12/31/24    Expected End:  01/14/25            Pt will complete sit <> stand and bed <> chair transfers with mod A.    (Progressing)       Start:  12/31/24    Expected End:  01/14/25            Pt will ambulate 20 feet mod A using FWW with no significant gait deviations.   (Progressing)       Start:  12/31/24    Expected End:  01/14/25            Pt will progress to completing 3 x 20 supine/seated exercises in order to increase strength and improve gait mechanics.   (Progressing)       Start:  12/31/24    Expected End:  01/14/25               Pain - Adult

## 2025-01-04 NOTE — PROGRESS NOTES
Occupational Therapy    OT Treatment    Patient Name: Dena Short  MRN: [id number]  Department: [address]  Room: [address] [place] [address]  Today's Date: 1/4/2025  Time Calculation  Start Time: 0810  Stop Time: 0838  Time Calculation (min): 28 min      Assessment:  End of Session Communication: Bedside nurse, PCT/NA/CTA  End of Session Patient Position: Up in chair, Alarm on     Plan:  Treatment Interventions: ADL retraining, Functional transfer training  OT Frequency: 5 times per week  Treatment Interventions: ADL retraining, Functional transfer training    Subjective   Previous Visit Info:  OT Last Visit  OT Received On: 01/04/25  General:  General  Reason for Referral: left hip hemiarthroplasty 12/20/24 after fall  Prior to Session Communication: Bedside nurse  Patient Position Received: Bed, 3 rail up, Alarm on  Precautions:  Hearing/Visual Limitations: Lummi, blind in R eye  LE Weight Bearing Status: Weight Bearing as Tolerated  Medical Precautions: Fall precautions  Post-Surgical Precautions: Left hip precautions    Pain:  Pain Assessment  Pain Assessment: 0-10  0-10 (Numeric) Pain Score: 3  Pain Type: Surgical pain  Pain Location: Hip  Pain Orientation: Left    Objective    Cognition:  Cognition  Overall Cognitive Status: Impaired  Orientation Level: Oriented X4  Following Commands: Follows multistep commands with repetition  Safety Judgment: Decreased awareness of need for safety precautions  Problem Solving: Assistance required to implement solutions  Insight: Moderate  Impulsive: Mildly     Activities of Daily Living: Toileting  Toileting Level of Assistance: Dependent  Where Assessed:  (in stance)  Functional Standing Tolerance:  Functional Standing Tolerance Comments: patient stood for a total of 3 mins this date with poor+/fair- balance with 2-1 ue uspport at all times during functional ambulation/transfers and ADL tasks  Bed Mobility/Transfers: Bed Mobility  Bed Mobility: Yes  Bed Mobility 1  Bed Mobility 1: Supine to  sitting  Level of Assistance 1: Maximum assistance, +2, Maximum tactile cues, Maximum verbal cues    Transfers  Transfer: Yes  Transfer 1  Technique 1: Sit to stand, Stand to sit  Transfer Device 1: Gait belt, Walker  Transfer Level of Assistance 1: Moderate assistance, +2, Moderate verbal cues, Moderate tactile cues  Transfers 2  Transfer Device 2: Gait belt, Walker  Transfer Level of Assistance 2: Minimum assistance, +2, Maximum tactile cues, Maximum verbal cues  Trials/Comments 2: functional ambulation (with chair follow)    Sitting Balance:  Dynamic Sitting Balance  Dynamic Sitting-Level of Assistance:  (fair+)  Standing Balance:  Dynamic Standing Balance  Dynamic Standing-Level of Assistance:  (fair-/poor+)    Therapy/Activity: Therapeutic Activity  Therapeutic Activity Performed: Yes  Therapeutic Activity 1: patient sat EOB for a total of 10 mins this date with fair+ balance with 2-0 ue support    Outcome Measures:[facility] Daily Activity  Putting on and taking off regular lower body clothing: Total  Bathing (including washing, rinsing, drying): A lot  Putting on and taking off regular upper body clothing: A lot  Toileting, which includes using toilet, bedpan or urinal: Total  Taking care of personal grooming such as brushing teeth: A lot  Eating Meals: A little  Daily Activity - Total Score: 11    OP EDUCATION:  Education  Individual(s) Educated: Patient  Education Provided: Diagnosis & Precautions, Symptom management, Ergonomics and postural realignment, Joint protection and energy conservation, Fall precautons, Risk and benefits of OT discussed with patient or other, POC discussed and agreed upon  Diagnosis and Precautions: posterior hip precautions  Equipment:  ([employer] tech)  Patient/Caregiver Demonstrated Understanding: yes  Plan of Care Discussed and Agreed Upon: yes  Patient Response to Education: Patient/Caregiver Verbalized Understanding of Information    Goals:  Encounter Problems       Encounter Problems  (Active)       OT Goals       Patient will complete UE adls with MOD I. (Progressing)       Start:  12/31/24    Expected End:  01/14/25            Patient will complete LE adls with MOD I. (Progressing)       Start:  12/31/24    Expected End:  01/14/25            Patient will complete transfers with MOD I. (Progressing)       Start:  12/31/24    Expected End:  01/14/25            Patient will complete functional mobility tasks with MOD I. (Progressing)       Start:  12/31/24    Expected End:  01/14/25

## 2025-01-04 NOTE — NURSING NOTE
Patient bladder scanned for greater than 550; assisted to BSC and voided 15 dk urine; post void bladder scan was 480  #16 reddy cath placed without incident to CD; drained 500cc dk urine

## 2025-01-04 NOTE — PROGRESS NOTES
Dena Short is a 89 y.o. female on day 6 of admission presenting with Closed fracture of left hip, initial encounter.      Subjective   89 y.o. female   PMH; CAD s/p cardiac cath with previous stents, history of NSTEMI, chronic diastolic heart failure [LVEF 55 to 60% in May 2021], paroxysmal atrial fibrillation on Xarelto s/p cardioversion but rate control medications/antiarrhythmics discontinued by patient, history of cardiomyopathy, sinus arrhythmia, PAD s/p stenting right SFA-popliteal artery and angioplasty right PT and peroneal arteries, CKD 4, and IDDM 2    Patient is feeling well, no N/V, no CP or SOB.          Objective          Vitals 24HR  Heart Rate:  [54-86]   Temp:  [36 °C (96.8 °F)-37.2 °C (99 °F)]   Resp:  [16]   BP: (103-181)/(55-88)   SpO2:  [95 %-100 %]     Intake/Output last 3 Shifts:    Intake/Output Summary (Last 24 hours) at 1/4/2025 1610  Last data filed at 1/4/2025 0540  Gross per 24 hour   Intake 1724.17 ml   Output 750 ml   Net 974.17 ml       Physical Exam  GENERAL: Alert, no distress, cooperative  SKIN: Skin color, texture, turgor normal. No rashes or lesions.  EYES: PERRLA, EOMI  HEENT: Head: Normocephalic  Neck: supple and no adenopathy  LUNGS: Lungs clear to auscultation. Good diaphragmatic excursion.  CARDIAC: Normal S1 and S2; no rubs, murmurs, or gallops  ABDOMEN: Abdomen soft, non-tender. BS normal. No masses or organomegaly.  EXTREMITIES: No ulcers, Extremities normal. No deformities, edema, clubbing or skin discoloration.  NEURO: Cranial nerves II-XII intact, no focal deficit.     Relevant Results             Scheduled medications  acetaminophen, 975 mg, oral, q8h  brimonidine, 1 drop, Left Eye, BID  cholecalciferol, 5,000 Units, oral, Daily  dorzolamide-timoloL, 1 drop, Left Eye, BID  ferrous sulfate (325 mg ferrous sulfate), 65 mg of iron, oral, BID  [Held by provider] furosemide, 40 mg, oral, Daily  insulin lispro, 0-10 Units, subcutaneous, Before meals &  nightly  lidocaine, 2 patch, transdermal, Nightly  rivaroxaban, 10 mg, oral, Daily  rosuvastatin, 5 mg, oral, q PM      Continuous medications       PRN medications  PRN medications: dextrose, dextrose, glucagon, glucagon, melatonin, ondansetron, oxyCODONE, polyethylene glycol  Results for orders placed or performed during the hospital encounter of 12/29/24 (from the past 24 hours)   POCT GLUCOSE   Result Value Ref Range    POCT Glucose 108 (H) 74 - 99 mg/dL   POCT GLUCOSE   Result Value Ref Range    POCT Glucose 171 (H) 74 - 99 mg/dL   CBC   Result Value Ref Range    WBC 8.7 4.4 - 11.3 x10*3/uL    nRBC 0.2 (H) 0.0 - 0.0 /100 WBCs    RBC 3.82 (L) 4.00 - 5.20 x10*6/uL    Hemoglobin 10.1 (L) 12.0 - 16.0 g/dL    Hematocrit 34.2 (L) 36.0 - 46.0 %    MCV 90 80 - 100 fL    MCH 26.4 26.0 - 34.0 pg    MCHC 29.5 (L) 32.0 - 36.0 g/dL    RDW 16.0 (H) 11.5 - 14.5 %    Platelets 261 150 - 450 x10*3/uL   Basic metabolic panel   Result Value Ref Range    Glucose 95 74 - 99 mg/dL    Sodium 137 136 - 145 mmol/L    Potassium 4.2 3.5 - 5.3 mmol/L    Chloride 103 98 - 107 mmol/L    Bicarbonate 26 21 - 32 mmol/L    Anion Gap 12 10 - 20 mmol/L    Urea Nitrogen 44 (H) 6 - 23 mg/dL    Creatinine 1.37 (H) 0.50 - 1.05 mg/dL    eGFR 36 (L) >60 mL/min/1.73m*2    Calcium 8.3 (L) 8.6 - 10.3 mg/dL   POCT GLUCOSE   Result Value Ref Range    POCT Glucose 96 74 - 99 mg/dL   POCT GLUCOSE   Result Value Ref Range    POCT Glucose 162 (H) 74 - 99 mg/dL   POCT GLUCOSE   Result Value Ref Range    POCT Glucose 126 (H) 74 - 99 mg/dL         Assessment/Plan        Assessment & Plan  Closed fracture of left hip, initial encounter    CKD (chronic kidney disease), stage IV (Multi)    Fall    Elevated troponin level    Neutrophilic leukocytosis      // ALESIA on CKD 3b.   - Baseline Cr; 1.3-1.7 back to 2019 with baseline GFR in the 3B range.   - Cr on admission; 1.35   - Offending meds; hold losartan and lasix during ALESIA.   - IV contrast; none.   - Hemodynamics; BP  208/84 on 12/30, 84/42 on 1/1.   - Check UA; trace blood, trace protein, LE.   - Check urine electrolytes;   - Check UPCR; 0.41   - Check renal US;  medical renal disease (R>L)   - Avoid nephrotoxic medications including NSAIDs and IV contrast if possible.   - Dose medication to eGFR.   - Etiology is likely; hemodynamic with fluctuating BP and decreased renal perfusion with possible ATN.   - improving with hydration  - stabilizing BP. (Still some dips)  Ok to resume lasix and losartan on dc.      // Proteinuria   - UPCR 0.41    - IDDM     // Anemia of CKD   - Hgb at goal.   - Medications; none.      // CKD MBD.   - PTH. 109   - 25D; 27   - Ca and phos at goal.   - Medications; none.      // Metabolic acidosis   None.      // HTN   // Hypotension.         // CAD s/p cardiac cath with previous stents, history of NSTEMI,   // chronic diastolic heart failure [LVEF 55 to 60% in May 2021],   // paroxysmal atrial fibrillation on Xarelto s/p cardioversion but rate control medications/antiarrhythmics discontinued by patient,   // PAD s/p stenting right SFA-popliteal artery and angioplasty right PT and peroneal arteries,   // IDDM 2        Alejandro Richards MD

## 2025-01-04 NOTE — CARE PLAN
The patient's goals for the shift include  pt will increase activity with PT/OT    The clinical goals for the shift include patient will remain HDS

## 2025-01-04 NOTE — CARE PLAN
The patient's goals for the shift include      The clinical goals for the shift include patient will remain HDS    Patient's BP were stable throughout the shift.    She did not void and states she does not have the urge. Straight cath order obtained from NP-output 250ml.

## 2025-01-05 LAB
ANION GAP SERPL CALC-SCNC: 14 MMOL/L (ref 10–20)
BUN SERPL-MCNC: 33 MG/DL (ref 6–23)
CALCIUM SERPL-MCNC: 8.7 MG/DL (ref 8.6–10.3)
CHLORIDE SERPL-SCNC: 101 MMOL/L (ref 98–107)
CO2 SERPL-SCNC: 26 MMOL/L (ref 21–32)
CREAT SERPL-MCNC: 1.11 MG/DL (ref 0.5–1.05)
EGFRCR SERPLBLD CKD-EPI 2021: 46 ML/MIN/1.73M*2
ERYTHROCYTE [DISTWIDTH] IN BLOOD BY AUTOMATED COUNT: 17 % (ref 11.5–14.5)
GLUCOSE BLD MANUAL STRIP-MCNC: 170 MG/DL (ref 74–99)
GLUCOSE BLD MANUAL STRIP-MCNC: 231 MG/DL (ref 74–99)
GLUCOSE BLD MANUAL STRIP-MCNC: 239 MG/DL (ref 74–99)
GLUCOSE SERPL-MCNC: 184 MG/DL (ref 74–99)
HCT VFR BLD AUTO: 36 % (ref 36–46)
HGB BLD-MCNC: 10.7 G/DL (ref 12–16)
MCH RBC QN AUTO: 26.7 PG (ref 26–34)
MCHC RBC AUTO-ENTMCNC: 29.7 G/DL (ref 32–36)
MCV RBC AUTO: 90 FL (ref 80–100)
NRBC BLD-RTO: 0.2 /100 WBCS (ref 0–0)
PLATELET # BLD AUTO: 298 X10*3/UL (ref 150–450)
POTASSIUM SERPL-SCNC: 4.6 MMOL/L (ref 3.5–5.3)
RBC # BLD AUTO: 4.01 X10*6/UL (ref 4–5.2)
SODIUM SERPL-SCNC: 136 MMOL/L (ref 136–145)
WBC # BLD AUTO: 9.3 X10*3/UL (ref 4.4–11.3)

## 2025-01-05 PROCEDURE — 97110 THERAPEUTIC EXERCISES: CPT | Mod: GP | Performed by: PHYSICAL THERAPIST

## 2025-01-05 PROCEDURE — 2500000002 HC RX 250 W HCPCS SELF ADMINISTERED DRUGS (ALT 637 FOR MEDICARE OP, ALT 636 FOR OP/ED): Performed by: NURSE PRACTITIONER

## 2025-01-05 PROCEDURE — 1200000002 HC GENERAL ROOM WITH TELEMETRY DAILY

## 2025-01-05 PROCEDURE — 97535 SELF CARE MNGMENT TRAINING: CPT | Mod: GO,CO

## 2025-01-05 PROCEDURE — 2500000001 HC RX 250 WO HCPCS SELF ADMINISTERED DRUGS (ALT 637 FOR MEDICARE OP): Performed by: NURSE PRACTITIONER

## 2025-01-05 PROCEDURE — 2500000004 HC RX 250 GENERAL PHARMACY W/ HCPCS (ALT 636 FOR OP/ED): Performed by: INTERNAL MEDICINE

## 2025-01-05 PROCEDURE — 36415 COLL VENOUS BLD VENIPUNCTURE: CPT | Performed by: NURSE PRACTITIONER

## 2025-01-05 PROCEDURE — 2500000005 HC RX 250 GENERAL PHARMACY W/O HCPCS: Performed by: NURSE PRACTITIONER

## 2025-01-05 PROCEDURE — 80048 BASIC METABOLIC PNL TOTAL CA: CPT | Performed by: NURSE PRACTITIONER

## 2025-01-05 PROCEDURE — 97530 THERAPEUTIC ACTIVITIES: CPT | Mod: GO,CO

## 2025-01-05 PROCEDURE — 82947 ASSAY GLUCOSE BLOOD QUANT: CPT

## 2025-01-05 PROCEDURE — 85027 COMPLETE CBC AUTOMATED: CPT | Performed by: NURSE PRACTITIONER

## 2025-01-05 RX ORDER — AMLODIPINE BESYLATE 5 MG/1
5 TABLET ORAL DAILY
Start: 2025-01-05

## 2025-01-05 RX ORDER — AMLODIPINE BESYLATE 5 MG/1
5 TABLET ORAL DAILY
Status: DISCONTINUED | OUTPATIENT
Start: 2025-01-05 | End: 2025-01-07 | Stop reason: HOSPADM

## 2025-01-05 RX ORDER — FUROSEMIDE 10 MG/ML
40 INJECTION INTRAMUSCULAR; INTRAVENOUS ONCE
Status: COMPLETED | OUTPATIENT
Start: 2025-01-05 | End: 2025-01-05

## 2025-01-05 RX ADMIN — RIVAROXABAN 10 MG: 10 TABLET, FILM COATED ORAL at 20:52

## 2025-01-05 RX ADMIN — ACETAMINOPHEN 975 MG: 325 TABLET ORAL at 05:51

## 2025-01-05 RX ADMIN — DORZOLAMIDE HYDROCHLORIDE AND TIMOLOL MALEATE 1 DROP: 20; 5 SOLUTION/ DROPS OPHTHALMIC at 09:09

## 2025-01-05 RX ADMIN — LIDOCAINE 4% 2 PATCH: 40 PATCH TOPICAL at 20:53

## 2025-01-05 RX ADMIN — BRIMONIDINE TARTRATE 1 DROP: 2 SOLUTION/ DROPS OPHTHALMIC at 09:10

## 2025-01-05 RX ADMIN — FERROUS SULFATE TAB 325 MG (65 MG ELEMENTAL FE) 325 MG: 325 (65 FE) TAB at 20:53

## 2025-01-05 RX ADMIN — INSULIN LISPRO 4 UNITS: 100 INJECTION, SOLUTION INTRAVENOUS; SUBCUTANEOUS at 20:53

## 2025-01-05 RX ADMIN — ACETAMINOPHEN 975 MG: 325 TABLET ORAL at 20:53

## 2025-01-05 RX ADMIN — LOSARTAN POTASSIUM 50 MG: 50 TABLET, FILM COATED ORAL at 09:09

## 2025-01-05 RX ADMIN — FUROSEMIDE 40 MG: 40 TABLET ORAL at 09:09

## 2025-01-05 RX ADMIN — INSULIN LISPRO 4 UNITS: 100 INJECTION, SOLUTION INTRAVENOUS; SUBCUTANEOUS at 18:40

## 2025-01-05 RX ADMIN — BRIMONIDINE TARTRATE 1 DROP: 2 SOLUTION/ DROPS OPHTHALMIC at 21:07

## 2025-01-05 RX ADMIN — FERROUS SULFATE TAB 325 MG (65 MG ELEMENTAL FE) 325 MG: 325 (65 FE) TAB at 09:09

## 2025-01-05 RX ADMIN — ROSUVASTATIN CALCIUM 5 MG: 5 TABLET, FILM COATED ORAL at 20:53

## 2025-01-05 RX ADMIN — INSULIN LISPRO 2 UNITS: 100 INJECTION, SOLUTION INTRAVENOUS; SUBCUTANEOUS at 09:09

## 2025-01-05 RX ADMIN — CHOLECALCIFEROL TAB 125 MCG (5000 UNIT) 5000 UNITS: 125 TAB at 09:09

## 2025-01-05 RX ADMIN — FUROSEMIDE 40 MG: 10 INJECTION, SOLUTION INTRAMUSCULAR; INTRAVENOUS at 18:40

## 2025-01-05 RX ADMIN — DORZOLAMIDE HYDROCHLORIDE AND TIMOLOL MALEATE 1 DROP: 20; 5 SOLUTION/ DROPS OPHTHALMIC at 21:07

## 2025-01-05 ASSESSMENT — COGNITIVE AND FUNCTIONAL STATUS - GENERAL
STANDING UP FROM CHAIR USING ARMS: A LOT
HELP NEEDED FOR BATHING: A LOT
PERSONAL GROOMING: A LITTLE
WALKING IN HOSPITAL ROOM: A LOT
TOILETING: TOTAL
MOBILITY SCORE: 11
MOVING TO AND FROM BED TO CHAIR: A LOT
MOVING FROM LYING ON BACK TO SITTING ON SIDE OF FLAT BED WITH BEDRAILS: A LOT
EATING MEALS: A LITTLE
CLIMB 3 TO 5 STEPS WITH RAILING: TOTAL
DRESSING REGULAR LOWER BODY CLOTHING: TOTAL
DAILY ACTIVITIY SCORE: 13
DRESSING REGULAR UPPER BODY CLOTHING: A LITTLE
TURNING FROM BACK TO SIDE WHILE IN FLAT BAD: A LOT

## 2025-01-05 ASSESSMENT — PAIN SCALES - GENERAL
PAINLEVEL_OUTOF10: 0 - NO PAIN
PAINLEVEL_OUTOF10: 2
PAINLEVEL_OUTOF10: 0 - NO PAIN
PAINLEVEL_OUTOF10: 2

## 2025-01-05 ASSESSMENT — ACTIVITIES OF DAILY LIVING (ADL): HOME_MANAGEMENT_TIME_ENTRY: 32

## 2025-01-05 ASSESSMENT — PAIN DESCRIPTION - DESCRIPTORS: DESCRIPTORS: ACHING

## 2025-01-05 ASSESSMENT — PAIN - FUNCTIONAL ASSESSMENT
PAIN_FUNCTIONAL_ASSESSMENT: 0-10
PAIN_FUNCTIONAL_ASSESSMENT: 0-10

## 2025-01-05 NOTE — CARE PLAN
The patient's goals for the shift include      The clinical goals for the shift include patient will remain HDS      Problem: Skin  Goal: Prevent/minimize sheer/friction injuries  Flowsheets (Taken 1/5/2025 1112)  Prevent/minimize sheer/friction injuries:   Complete micro-shifts as needed if patient unable. Adjust patient position to relieve pressure points, not a full turn   HOB 30 degrees or less   Turn/reposition every 2 hours/use positioning/transfer devices   Use pull sheet   Increase activity/out of bed for meals

## 2025-01-05 NOTE — CARE PLAN
The patient's goals for the shift include      The clinical goals for the shift include patient will remain HDS      Problem: Discharge Planning  Goal: Discharge to home or other facility with appropriate resources  Outcome: Progressing     Problem: Chronic Conditions and Co-morbidities  Goal: Patient's chronic conditions and co-morbidity symptoms are monitored and maintained or improved  Outcome: Progressing     Problem: Skin  Goal: Prevent/manage excess moisture  Outcome: Progressing  Flowsheets (Taken 1/5/2025 0504)  Prevent/manage excess moisture: Cleanse incontinence/protect with barrier cream  Goal: Prevent/minimize sheer/friction injuries  Outcome: Progressing  Flowsheets (Taken 1/5/2025 0504)  Prevent/minimize sheer/friction injuries: Turn/reposition every 2 hours/use positioning/transfer devices  Goal: Promote/optimize nutrition  Outcome: Progressing  Flowsheets (Taken 1/5/2025 0504)  Promote/optimize nutrition:   Assist with feeding   Monitor/record intake including meals  Goal: Promote skin healing  Outcome: Progressing  Flowsheets (Taken 1/5/2025 0504)  Promote skin healing: Protective dressings over bony prominences     Problem: Fall/Injury  Goal: Not fall by end of shift  Outcome: Progressing  Goal: Be free from injury by end of the shift  Outcome: Progressing     Problem: Pain  Goal: Takes deep breaths with improved pain control throughout the shift  Outcome: Progressing  Goal: Turns in bed with improved pain control throughout the shift  Outcome: Progressing

## 2025-01-05 NOTE — DISCHARGE INSTRUCTIONS
Dr. Vega Hip Instructions  *Call Dr. Vega for any problems and/or concerns.  *Maintain hip precautions  *Weight bearing instructions: WBAT  *Leave dressing in place until post-op day 14 (1/13/24) as Dr Vega will remove at post-op appointment (2 weeks post surgery). May change if soiled.   *You may shower, do not soak or scrub incision; pat dry  *Apply ice to hip as needed to minimize swelling, on 20 minutes, off 20 minutes  *Continue the coughing and deep breathing exercises that you learned in the hospital  *Move around as much as you can tolerate because movement can help you heal and helps prevent stiffness, skin sores, and blood clots    Call Doctor right away for:  *Increased hip pain  *Pain or swelling in your calf of leg  *Fever above 100.4/shaking chills  *Excessive swelling, increased redness or drainage around the incision  *Your incision breaks open  *Chest pain/shortness of breath, call 911    After the procedure it is common to have pain and swelling.      -To help prevent hip dislocation, follow instructions about movement restrictions as told by your physician:  *Do not cross your legs at the knees. To remind yourself about this, you may keep a pillow between your legs while lying in bed  *Do not bend at the hip and waist or bend farther than 90 degrees of flexion.   To avoid bending this far: do not bring your knees higher than your hips, do not  something from the floor while sitting in a chair, avoid sitting in low chairs, used raised toilet seat (if needed), when standing up from a seated position- keep injured leg out in front of you  *Avoid twisting at your waist and reaching across your body to the side of the affected leg  *Avoid rotating your toes inward on the affected leg    *When getting into a car:  1. Raise the seat as high as possible, move the seat as far back as it will go, and recline the upper part of the seat slightly  2. Sit down on the seat with your injured leg extended out  of the car  3. Scoot back in the seat as you move the lower half of your body into the car. Try to avoid bumping your foot or leg as you bring it into the car. To make this motion smooth and easy on a cloth seat, try placing a plastic bag on the seat    *If your physician has prescribed compression stockings please use as directed. These stockings help to prevent blood clots and reduce swelling in your legs  *Continue to do the exercises you learned in the hospital: i.e. ankle pumps  *If you were prescribed a blood thinner (anticoagulant), take it as prescribed    *Do not use any products that contain nicotine or tobacco, such as cigarettes and e-cigarettes. These can delay bone healing. If you need help quitting, ask your healthcare provider    If you are taking prescription pain medication, take actions to prevent or treat constipation.  -drink enough fluids to keep your urine pale yellow  -eat foods that are high in fiber, such as fresh fruits and vegetables, whole grains, and beans  -limit food that are high in fat and processed sugars, such as fried or sweet foods  -take an over the counter or prescribed medicine for constipation    Iniguez catheter

## 2025-01-05 NOTE — PROGRESS NOTES
Occupational Therapy    OT Treatment    Patient Name: Dena Short  MRN: [id number]  Today's Date: 1/5/2025  Time Calculation  Start Time: 0755  Stop Time: 0850  Time Calculation (min): 55 min       4114/4114-A    Assessment:  OT Assessment: Patient tolerated tasks well with mod a throughout therapy session, additional time required for transfers, bed mobility  Prognosis: Good  Evaluation/Treatment Tolerance: Patient tolerated treatment well  End of Session Communication: Bedside nurse  End of Session Patient Position: Bed, 3 rail up, Alarm on  OT Assessment Results: Decreased ADL status, Decreased functional mobility  Prognosis: Good  Evaluation/Treatment Tolerance: Patient tolerated treatment well    Plan:  Treatment Interventions: ADL retraining, Functional transfer training  OT Frequency: 5 times per week  OT Discharge Recommendations: Moderate intensity level of continued care  Equipment Recommended upon Discharge: Wheeled walker  Treatment Interventions: ADL retraining, Functional transfer training  Subjective        01/05/25 0755   OT Last Visit   OT Received On 01/05/25   General   Reason for Referral left hip hemiarthroplasty 12/20/24 after fall   Referred By Dr. Vega   Past Medical History Relevant to Rehab To hospital following fall. Pt found to have left hip fracture. Pt underwent left hip hemiarthroplasty 12/20/24. PMH: bradycardia, CAD, cardiomyopathy, CKD, DM, PAD, NSTEMI, cardiac cath, cardioversion, right hip hemiarthropasty   Prior to Session Communication Bedside nurse   Patient Position Received Bed, 3 rail up;Alarm on   Preferred Learning Style verbal;visual   General Comment Patient is agreeable to therapy, additional time required to complete transfers   Precautions   Hearing/Visual Limitations [place], blind in R eye   LE Weight Bearing Status Weight Bearing as Tolerated   Medical Precautions Fall precautions   Post-Surgical Precautions Left hip precautions   Pain Assessment   Pain Assessment 0-10    0-10 (Numeric) Pain Score 2   Pain Type Surgical pain   Pain Location Hip   Pain Orientation Left   Pain Descriptors Aching   Pain Frequency Constant/continuous   Clinical Progression Not changed   Pain Interventions Medication (See MAR)   Response to Interventions Decrease in pain   Cognition   Orientation Level Oriented X4   Grooming   Grooming Level of Assistance Setup   Grooming Where Assessed Bed level   Grooming Comments facial hygiene completed   UE Bathing   UE Bathing Level of Assistance Minimum assistance   UE Bathing Where Assessed Edge of bed   UE Bathing Comments with wipe, assist with back   UE Dressing   UE Dressing Level of Assistance Minimum assistance   UE Dressing Where Assessed Edge of bed   UE Dressing Comments to doff/jennifer gown   LE Dressing   LE Dressing Yes   Sock Level of Assistance Maximum assistance   LE Dressing Where Assessed Edge of bed   LE Dressing Comments to adjust bilateral socks   Toileting   Toileting Level of Assistance Maximum assistance   Where Assessed Bedside commode   Toileting Comments assist with clothing mgmt, juan manuel care   Bed Mobility   Bed Mobility Yes   Bed Mobility 1   Bed Mobility 1 Supine to sitting   Level of Assistance 1 Moderate assistance   Bed Mobility Comments 1 assist with LE mgmt, upright positioning, additional time required to complete transfer   Bed Mobility 2   Bed Mobility  2 Supine to sitting   Level of Assistance 2 Moderate assistance;Maximum assistance   Bed Mobility Comments 2 assist with LE mgmt, repositioning at bed level   Transfers   Transfer Yes   Transfer 1   Transfer From 1 Bed to   Transfer to 1 Commode-standard   Technique 1 Sit to stand;Stand to sit   Transfer Device 1 Gait belt;Walker   Transfer Level of Assistance 1 Moderate assistance;Minimum assistance   Trials/Comments 1 verbal cues provided for safe hand placement transfer, ww mgmt   Transfers 2   Transfer From 2 Commode-standard to   Transfer to 2 Bed   Technique 2 Sit to  stand;Stand to sit   Transfer Device 2 Gait belt;Walker   Transfer Level of Assistance 2 Minimum assistance;Moderate assistance   Trials/Comments 2 cues for hand/foot placement   Therapeutic Activity   Therapeutic Activity Performed Yes   Therapeutic Activity 1 multiple transfer, ambulation within room   IP OT Assessment   OT Assessment Patient tolerated tasks well with mod a throughout therapy session, additional time required for transfers, bed mobility   Prognosis Good   Evaluation/Treatment Tolerance Patient tolerated treatment well   End of Session Communication Bedside nurse   End of Session Patient Position Bed, 3 rail up;Alarm on   OT Assessment   OT Assessment Results Decreased ADL status;Decreased functional mobility   Education   Individual(s) Educated Patient   Education Provided Diagnosis & Precautions;Symptom management;Ergonomics and postural realignment;Joint protection and energy conservation;Fall precautons;Risk and benefits of OT discussed with patient or other;POC discussed and agreed upon   Inpatient/Swing Bed or Outpatient   Inpatient/Swing Bed or Outpatient Inpatient   Inpatient Plan   Treatment Interventions ADL retraining;Functional transfer training   OT Frequency 5 times per week   OT Discharge Recommendations Moderate intensity level of continued care   Equipment Recommended upon Discharge Wheeled walker       Outcome Measures:[facility] Daily Activity  Putting on and taking off regular lower body clothing: Total  Bathing (including washing, rinsing, drying): A lot  Putting on and taking off regular upper body clothing: A little  Toileting, which includes using toilet, bedpan or urinal: Total  Taking care of personal grooming such as brushing teeth: A little  Eating Meals: A little  Daily Activity - Total Score: 13  Education Documentation  Body Mechanics, taught by KAREN Ac at 1/5/2025  9:27 AM.  Learner: Patient  Readiness: Acceptance  Method: Explanation  Response: Verbalizes  Understanding    Precautions, taught by KAREN Ac at 1/5/2025  9:27 AM.  Learner: Patient  Readiness: Acceptance  Method: Explanation  Response: Verbalizes Understanding    ADL Training, taught by KAREN Ac at 1/5/2025  9:27 AM.  Learner: Patient  Readiness: Acceptance  Method: Explanation  Response: Verbalizes Understanding    Education Comments  No comments found.      Goals:  Encounter Problems       Encounter Problems (Active)       OT Goals       Patient will complete UE adls with MOD I. (Progressing)       Start:  12/31/24    Expected End:  01/14/25            Patient will complete LE adls with MOD I. (Progressing)       Start:  12/31/24    Expected End:  01/14/25            Patient will complete transfers with MOD I. (Progressing)       Start:  12/31/24    Expected End:  01/14/25            Patient will complete functional mobility tasks with MOD I. (Progressing)       Start:  12/31/24    Expected End:  01/14/25

## 2025-01-05 NOTE — PROGRESS NOTES
Physical Therapy    Physical Therapy    Physical Therapy Treatment    Patient Name: Dena Short  MRN: [id number]  Today's Date: 1/5/2025        4114/4114-A    Assessment/Plan   PT Assessment  PT Assessment Results: Decreased strength, Decreased range of motion, Decreased endurance, Orthopedic restrictions  Barriers to Discharge Home: Cognition needs, Physical needs  Cognition Needs: 24hr supervision for safety awareness needed  Physical Needs: In-home setup navigation limited by function/safety, Ambulating household distances limited by function/safety, 24hr mobility assistance needed, High falls risk due to function or environment  Evaluation/Treatment Tolerance: Patient limited by fatigue  End of Session Communication: Bedside nurse  Assessment Comment: Limited activity this afternoon d/t pt c/o fatigue. Pt was OOB in the AM but declined in PM. Will continue to work with pt to maximize functional mobility.  End of Session Patient Position: Bed, 3 rail up, Alarm on  PT Plan  Inpatient/Swing Bed or Outpatient: Inpatient  PT Plan  Treatment/Interventions: Bed mobility, Transfer training, Gait training, Balance training, Neuromuscular re-education, Strengthening, Endurance training, Range of motion, Therapeutic exercise, Therapeutic activity, Home exercise program  PT Plan: Ongoing PT  PT Frequency: Daily  PT Discharge Recommendations: Moderate intensity level of continued care  Equipment Recommended upon Discharge: Wheeled walker  PT Recommended Transfer Status: Assist x2  PT - OK to Discharge: Yes, to recommended next level of care as indicated in PT eval, once cleared by medical team.       01/05/25 1336   PT  Visit   PT Received On 01/05/25   Response to Previous Treatment Patient with no complaints from previous session.   General   Reason for Referral left hip hemiarthroplasty 12/30   Referred By Dr. Vega   Past Medical History Relevant to Rehab PMH: bradycardia, CAD, cardiomyopathy, CKD, DM, PAD, NSTEMI,  cardiac cath, cardioversion, right hip hemiarthropasty   Prior to Session Communication Bedside nurse   Patient Position Received Bed, 3 rail up;Alarm on   General Comment Attempted twice earlier in the day; first time pt eating, second time was asleep. On third attempt, pt still sleeping but able to be aroused and participate. However, refused OOB activity d/t fatigue. Agreed to supine ther-ex only.   Precautions   Medical Precautions Fall precautions   Post-Surgical Precautions Left hip precautions   Pain Assessment   Pain Assessment 0-10   0-10 (Numeric) Pain Score 0 - No pain   Cognition   Overall Cognitive Status   (Upon awakening, appeared somewhat confused; as session progressed cognition seemed to improve. Pt occ inappropriate (calling staff names, etc.) but mostly cooperative.)   Strength RLE   RLE Overall Strength Greater than or equal to 3/5 as evidenced by functional mobility   LLE    LLE  X   Strength LLE   L Hip Flexion 2+/5   L Hip ABduction 2-/5   L Hip ADduction 2-/5   L Knee Extension 3-/5   Therapeutic Exercise   Therapeutic Exercise Performed   (Supine AP, SAQ, HS, hip ABD/ADD x 20 reps ea; all AA except AP.)   Activity Tolerance   Endurance Tolerates 10 - 20 min exercise with multiple rests  (Declined OOB activity d/t fatigue)   PT Assessment   PT Assessment Results Decreased strength;Decreased range of motion;Decreased endurance;Orthopedic restrictions   Barriers to Discharge Home Cognition needs;Physical needs   Cognition Needs 24hr supervision for safety awareness needed   Physical Needs In-home setup navigation limited by function/safety;Ambulating household distances limited by function/safety;24hr mobility assistance needed;High falls risk due to function or environment   Evaluation/Treatment Tolerance Patient limited by fatigue   End of Session Communication Bedside nurse   Assessment Comment Limited activity this afternoon d/t pt c/o fatigue. Pt was OOB in the AM but declined in PM. Will  continue to work with pt to maximize functional mobility.   End of Session Patient Position Bed, 3 rail up;Alarm on   PT Plan   Inpatient/Swing Bed or Outpatient Inpatient   PT Plan   PT Discharge Recommendations Moderate intensity level of continued care   Equipment Recommended upon Discharge Wheeled walker   PT Recommended Transfer Status Assist x2         Outcome Measures:  [facility] Basic Mobility  Turning from your back to your side while in a flat bed without using bedrails: A lot (AMPAC scored based on 1/4 session as pt did not get OOB this date d/t fatigue.)  Moving from lying on your back to sitting on the side of a flat bed without using bedrails: A lot  Moving to and from bed to chair (including a wheelchair): A lot  Standing up from a chair using your arms (e.g. wheelchair or bedside chair): A lot  To walk in hospital room: A lot  Climbing 3-5 steps with railing: Total  Basic Mobility - Total Score: 11                             EDUCATION:     Education Documentation  Body Mechanics, taught by Starr Watkins PT at 1/5/2025  3:11 PM.  Learner: Patient  Readiness: Acceptance  Method: Explanation  Response: Needs Reinforcement  Comment: Reinforced importance of cont'd PT, including ther-ex and OOB activity.    Precautions, taught by Starr Watkins PT at 1/5/2025  3:11 PM.  Learner: Patient  Readiness: Acceptance  Method: Explanation  Response: Needs Reinforcement  Comment: Reinforced importance of cont'd PT, including ther-ex and OOB activity.    ADL Training, taught by Starr Watkins PT at 1/5/2025  3:11 PM.  Learner: Patient  Readiness: Acceptance  Method: Explanation  Response: Needs Reinforcement  Comment: Reinforced importance of cont'd PT, including ther-ex and OOB activity.    Precautions, taught by Starr Watkins PT at 1/5/2025  3:11 PM.  Learner: Patient  Readiness: Acceptance  Method: Explanation  Response: Needs Reinforcement  Comment: Reinforced importance of cont'd PT, including ther-ex  and OOB activity.    Body Mechanics, taught by Starr Watkins PT at 1/5/2025  3:11 PM.  Learner: Patient  Readiness: Acceptance  Method: Explanation  Response: Needs Reinforcement  Comment: Reinforced importance of cont'd PT, including ther-ex and OOB activity.    Mobility Training, taught by Starr Watkins PT at 1/5/2025  3:11 PM.  Learner: Patient  Readiness: Acceptance  Method: Explanation  Response: Needs Reinforcement  Comment: Reinforced importance of cont'd PT, including ther-ex and OOB activity.    Education Comments  No comments found.        GOALS:  Encounter Problems       Encounter Problems (Active)       PT Problem       Pt will perform bed mobility with mod A.  (Progressing)       Start:  12/31/24    Expected End:  01/14/25            Pt will complete sit <> stand and bed <> chair transfers with mod A.    (Progressing)       Start:  12/31/24    Expected End:  01/14/25            Pt will ambulate 20 feet mod A using FWW with no significant gait deviations.   (Progressing)       Start:  12/31/24    Expected End:  01/14/25            Pt will progress to completing 3 x 20 supine/seated exercises in order to increase strength and improve gait mechanics.   (Progressing)       Start:  12/31/24    Expected End:  01/14/25               Pain - Adult

## 2025-01-05 NOTE — PROGRESS NOTES
Dena Short is a 89 y.o. female on day 7 of admission presenting with Closed fracture of left hip, initial encounter.      Subjective   89 y.o. female   PMH; CAD s/p cardiac cath with previous stents, history of NSTEMI, chronic diastolic heart failure [LVEF 55 to 60% in May 2021], paroxysmal atrial fibrillation on Xarelto s/p cardioversion but rate control medications/antiarrhythmics discontinued by patient, history of cardiomyopathy, sinus arrhythmia, PAD s/p stenting right SFA-popliteal artery and angioplasty right PT and peroneal arteries, CKD 4, and IDDM 2    Patient is feeling well, no N/V, no CP or SOB.          Objective          Vitals 24HR  Heart Rate:  [64-87]   Temp:  [36.5 °C (97.7 °F)-37.1 °C (98.8 °F)]   Resp:  [16-18]   BP: (128-189)/(63-94)   SpO2:  [94 %-97 %]     Intake/Output last 3 Shifts:    Intake/Output Summary (Last 24 hours) at 1/5/2025 1506  Last data filed at 1/5/2025 1200  Gross per 24 hour   Intake 340 ml   Output 1575 ml   Net -1235 ml       Physical Exam  GENERAL: Alert, no distress, cooperative  SKIN: Skin color, texture, turgor normal. No rashes or lesions.  EYES: PERRLA, EOMI  HEENT: Head: Normocephalic  Neck: supple and no adenopathy  LUNGS: Lungs clear to auscultation. Good diaphragmatic excursion.  CARDIAC: Normal S1 and S2; no rubs, murmurs, or gallops  ABDOMEN: Abdomen soft, non-tender. BS normal. No masses or organomegaly.  EXTREMITIES: No ulcers, Extremities normal. No deformities, edema, clubbing or skin discoloration.  NEURO: Cranial nerves II-XII intact, no focal deficit.     Relevant Results             Scheduled medications  acetaminophen, 975 mg, oral, q8h  amLODIPine, 5 mg, oral, Daily  brimonidine, 1 drop, Left Eye, BID  cholecalciferol, 5,000 Units, oral, Daily  dorzolamide-timoloL, 1 drop, Left Eye, BID  ferrous sulfate (325 mg ferrous sulfate), 65 mg of iron, oral, BID  furosemide, 40 mg, oral, Daily  insulin lispro, 0-10 Units, subcutaneous, Before meals &  nightly  lidocaine, 2 patch, transdermal, Nightly  losartan, 50 mg, oral, Daily  rivaroxaban, 10 mg, oral, Daily  rosuvastatin, 5 mg, oral, q PM      Continuous medications       PRN medications  PRN medications: dextrose, dextrose, glucagon, glucagon, melatonin, ondansetron, oxyCODONE, polyethylene glycol  Results for orders placed or performed during the hospital encounter of 12/29/24 (from the past 24 hours)   POCT GLUCOSE   Result Value Ref Range    POCT Glucose 126 (H) 74 - 99 mg/dL   POCT GLUCOSE   Result Value Ref Range    POCT Glucose 235 (H) 74 - 99 mg/dL   CBC   Result Value Ref Range    WBC 9.3 4.4 - 11.3 x10*3/uL    nRBC 0.2 (H) 0.0 - 0.0 /100 WBCs    RBC 4.01 4.00 - 5.20 x10*6/uL    Hemoglobin 10.7 (L) 12.0 - 16.0 g/dL    Hematocrit 36.0 36.0 - 46.0 %    MCV 90 80 - 100 fL    MCH 26.7 26.0 - 34.0 pg    MCHC 29.7 (L) 32.0 - 36.0 g/dL    RDW 17.0 (H) 11.5 - 14.5 %    Platelets 298 150 - 450 x10*3/uL   Basic metabolic panel   Result Value Ref Range    Glucose 184 (H) 74 - 99 mg/dL    Sodium 136 136 - 145 mmol/L    Potassium 4.6 3.5 - 5.3 mmol/L    Chloride 101 98 - 107 mmol/L    Bicarbonate 26 21 - 32 mmol/L    Anion Gap 14 10 - 20 mmol/L    Urea Nitrogen 33 (H) 6 - 23 mg/dL    Creatinine 1.11 (H) 0.50 - 1.05 mg/dL    eGFR 46 (L) >60 mL/min/1.73m*2    Calcium 8.7 8.6 - 10.3 mg/dL   POCT GLUCOSE   Result Value Ref Range    POCT Glucose 170 (H) 74 - 99 mg/dL         Assessment/Plan        Assessment & Plan  Closed fracture of left hip, initial encounter    CKD (chronic kidney disease), stage IV (Multi)    Fall    Elevated troponin level    Neutrophilic leukocytosis      // ALESIA on CKD 3b.   - Baseline Cr; 1.3-1.7 back to 2019 with baseline GFR in the 3B range.   - Cr on admission; 1.35   - Offending meds; hold losartan and lasix during ALESIA.   - IV contrast; none.   - Hemodynamics; /84 on 12/30, 84/42 on 1/1.   - Check UA; trace blood, trace protein, LE.   - Check urine electrolytes;   - Check UPCR; 0.41    - Check renal US;  medical renal disease (R>L)   - Avoid nephrotoxic medications including NSAIDs and IV contrast if possible.   - Dose medication to eGFR.   - Etiology is likely; hemodynamic with fluctuating BP and decreased renal perfusion with possible ATN.   Ok to resume lasix and losartan on dc.   Repeat labs in 1 week   Follow in office in 2 weeks.      // Proteinuria   - UPCR 0.41    - IDDM     // Anemia of CKD   - Hgb at goal.   - Medications; none.      // CKD MBD.   - PTH. 109   - 25D; 27   - Ca and phos at goal.   - Medications; none.      // Metabolic acidosis   None.      // HTN   // Hypotension.         // CAD s/p cardiac cath with previous stents, history of NSTEMI,   // chronic diastolic heart failure [LVEF 55 to 60% in May 2021],   // paroxysmal atrial fibrillation on Xarelto s/p cardioversion but rate control medications/antiarrhythmics discontinued by patient,   // PAD s/p stenting right SFA-popliteal artery and angioplasty right PT and peroneal arteries,   // IDDM 2        Alejandro Richards MD

## 2025-01-06 VITALS
WEIGHT: 160.94 LBS | OXYGEN SATURATION: 93 % | HEIGHT: 60 IN | HEART RATE: 84 BPM | TEMPERATURE: 98.2 F | SYSTOLIC BLOOD PRESSURE: 141 MMHG | BODY MASS INDEX: 31.6 KG/M2 | DIASTOLIC BLOOD PRESSURE: 79 MMHG | RESPIRATION RATE: 18 BRPM

## 2025-01-06 LAB
ANION GAP SERPL CALC-SCNC: 12 MMOL/L (ref 10–20)
BUN SERPL-MCNC: 27 MG/DL (ref 6–23)
CALCIUM SERPL-MCNC: 8.2 MG/DL (ref 8.6–10.3)
CHLORIDE SERPL-SCNC: 101 MMOL/L (ref 98–107)
CO2 SERPL-SCNC: 28 MMOL/L (ref 21–32)
CREAT SERPL-MCNC: 1.18 MG/DL (ref 0.5–1.05)
EGFRCR SERPLBLD CKD-EPI 2021: 43 ML/MIN/1.73M*2
ERYTHROCYTE [DISTWIDTH] IN BLOOD BY AUTOMATED COUNT: 17.2 % (ref 11.5–14.5)
GLUCOSE BLD MANUAL STRIP-MCNC: 131 MG/DL (ref 74–99)
GLUCOSE BLD MANUAL STRIP-MCNC: 157 MG/DL (ref 74–99)
GLUCOSE BLD MANUAL STRIP-MCNC: 176 MG/DL (ref 74–99)
GLUCOSE BLD MANUAL STRIP-MCNC: 205 MG/DL (ref 74–99)
GLUCOSE SERPL-MCNC: 166 MG/DL (ref 74–99)
HCT VFR BLD AUTO: 32.3 % (ref 36–46)
HGB BLD-MCNC: 9.6 G/DL (ref 12–16)
MCH RBC QN AUTO: 26 PG (ref 26–34)
MCHC RBC AUTO-ENTMCNC: 29.7 G/DL (ref 32–36)
MCV RBC AUTO: 88 FL (ref 80–100)
NRBC BLD-RTO: 0.2 /100 WBCS (ref 0–0)
PLATELET # BLD AUTO: 289 X10*3/UL (ref 150–450)
POTASSIUM SERPL-SCNC: 4.4 MMOL/L (ref 3.5–5.3)
RBC # BLD AUTO: 3.69 X10*6/UL (ref 4–5.2)
SODIUM SERPL-SCNC: 137 MMOL/L (ref 136–145)
WBC # BLD AUTO: 8.6 X10*3/UL (ref 4.4–11.3)

## 2025-01-06 PROCEDURE — 85027 COMPLETE CBC AUTOMATED: CPT | Performed by: NURSE PRACTITIONER

## 2025-01-06 PROCEDURE — 1200000002 HC GENERAL ROOM WITH TELEMETRY DAILY

## 2025-01-06 PROCEDURE — 2500000001 HC RX 250 WO HCPCS SELF ADMINISTERED DRUGS (ALT 637 FOR MEDICARE OP): Performed by: NURSE PRACTITIONER

## 2025-01-06 PROCEDURE — 82947 ASSAY GLUCOSE BLOOD QUANT: CPT

## 2025-01-06 PROCEDURE — 2500000001 HC RX 250 WO HCPCS SELF ADMINISTERED DRUGS (ALT 637 FOR MEDICARE OP): Performed by: INTERNAL MEDICINE

## 2025-01-06 PROCEDURE — 2500000002 HC RX 250 W HCPCS SELF ADMINISTERED DRUGS (ALT 637 FOR MEDICARE OP, ALT 636 FOR OP/ED): Performed by: NURSE PRACTITIONER

## 2025-01-06 PROCEDURE — 97110 THERAPEUTIC EXERCISES: CPT | Mod: GP,CQ

## 2025-01-06 PROCEDURE — 36415 COLL VENOUS BLD VENIPUNCTURE: CPT | Performed by: NURSE PRACTITIONER

## 2025-01-06 PROCEDURE — 97535 SELF CARE MNGMENT TRAINING: CPT | Mod: GO,CO

## 2025-01-06 PROCEDURE — 80048 BASIC METABOLIC PNL TOTAL CA: CPT | Performed by: NURSE PRACTITIONER

## 2025-01-06 PROCEDURE — 2500000005 HC RX 250 GENERAL PHARMACY W/O HCPCS: Performed by: NURSE PRACTITIONER

## 2025-01-06 PROCEDURE — 97116 GAIT TRAINING THERAPY: CPT | Mod: GP,CQ

## 2025-01-06 RX ADMIN — ROSUVASTATIN CALCIUM 5 MG: 5 TABLET, FILM COATED ORAL at 20:55

## 2025-01-06 RX ADMIN — FERROUS SULFATE TAB 325 MG (65 MG ELEMENTAL FE) 325 MG: 325 (65 FE) TAB at 20:55

## 2025-01-06 RX ADMIN — BRIMONIDINE TARTRATE 1 DROP: 2 SOLUTION/ DROPS OPHTHALMIC at 09:07

## 2025-01-06 RX ADMIN — ACETAMINOPHEN 975 MG: 325 TABLET ORAL at 05:22

## 2025-01-06 RX ADMIN — BRIMONIDINE TARTRATE 1 DROP: 2 SOLUTION/ DROPS OPHTHALMIC at 20:55

## 2025-01-06 RX ADMIN — INSULIN LISPRO 2 UNITS: 100 INJECTION, SOLUTION INTRAVENOUS; SUBCUTANEOUS at 09:06

## 2025-01-06 RX ADMIN — FUROSEMIDE 40 MG: 40 TABLET ORAL at 09:07

## 2025-01-06 RX ADMIN — RIVAROXABAN 10 MG: 10 TABLET, FILM COATED ORAL at 20:55

## 2025-01-06 RX ADMIN — OXYCODONE HYDROCHLORIDE 2.5 MG: 5 TABLET ORAL at 20:55

## 2025-01-06 RX ADMIN — AMLODIPINE BESYLATE 5 MG: 5 TABLET ORAL at 09:07

## 2025-01-06 RX ADMIN — INSULIN LISPRO 4 UNITS: 100 INJECTION, SOLUTION INTRAVENOUS; SUBCUTANEOUS at 21:02

## 2025-01-06 RX ADMIN — FERROUS SULFATE TAB 325 MG (65 MG ELEMENTAL FE) 325 MG: 325 (65 FE) TAB at 09:07

## 2025-01-06 RX ADMIN — ACETAMINOPHEN 975 MG: 325 TABLET ORAL at 21:06

## 2025-01-06 RX ADMIN — LIDOCAINE 4% 2 PATCH: 40 PATCH TOPICAL at 20:55

## 2025-01-06 RX ADMIN — LOSARTAN POTASSIUM 50 MG: 50 TABLET, FILM COATED ORAL at 09:07

## 2025-01-06 RX ADMIN — DORZOLAMIDE HYDROCHLORIDE AND TIMOLOL MALEATE 1 DROP: 20; 5 SOLUTION/ DROPS OPHTHALMIC at 20:55

## 2025-01-06 RX ADMIN — DORZOLAMIDE HYDROCHLORIDE AND TIMOLOL MALEATE 1 DROP: 20; 5 SOLUTION/ DROPS OPHTHALMIC at 09:07

## 2025-01-06 RX ADMIN — CHOLECALCIFEROL TAB 125 MCG (5000 UNIT) 5000 UNITS: 125 TAB at 09:07

## 2025-01-06 ASSESSMENT — COGNITIVE AND FUNCTIONAL STATUS - GENERAL
EATING MEALS: A LITTLE
DRESSING REGULAR UPPER BODY CLOTHING: A LITTLE
MOVING FROM LYING ON BACK TO SITTING ON SIDE OF FLAT BED WITH BEDRAILS: A LOT
MOBILITY SCORE: 12
WALKING IN HOSPITAL ROOM: A LOT
TOILETING: TOTAL
DAILY ACTIVITIY SCORE: 13
MOVING TO AND FROM BED TO CHAIR: A LOT
HELP NEEDED FOR BATHING: A LOT
STANDING UP FROM CHAIR USING ARMS: A LOT
DRESSING REGULAR LOWER BODY CLOTHING: A LOT
TURNING FROM BACK TO SIDE WHILE IN FLAT BAD: A LOT
PERSONAL GROOMING: A LOT
CLIMB 3 TO 5 STEPS WITH RAILING: A LOT

## 2025-01-06 ASSESSMENT — PAIN - FUNCTIONAL ASSESSMENT
PAIN_FUNCTIONAL_ASSESSMENT: 0-10
PAIN_FUNCTIONAL_ASSESSMENT: 0-10

## 2025-01-06 ASSESSMENT — PAIN SCALES - GENERAL
PAINLEVEL_OUTOF10: 0 - NO PAIN
PAINLEVEL_OUTOF10: 4
PAINLEVEL_OUTOF10: 2

## 2025-01-06 ASSESSMENT — PAIN DESCRIPTION - LOCATION: LOCATION: HIP

## 2025-01-06 ASSESSMENT — ACTIVITIES OF DAILY LIVING (ADL): HOME_MANAGEMENT_TIME_ENTRY: 44

## 2025-01-06 ASSESSMENT — PAIN DESCRIPTION - ORIENTATION: ORIENTATION: LEFT

## 2025-01-06 ASSESSMENT — PAIN SCALES - WONG BAKER: WONGBAKER_NUMERICALRESPONSE: NO HURT

## 2025-01-06 NOTE — CARE PLAN
The patient's goals for the shift include      The clinical goals for the shift include Patient will have pain managed at a tolerable level this shift. Patient will remain free from falls. Patient will have increased mobility/ROM this shift.      Problem: Discharge Planning  Goal: Discharge to home or other facility with appropriate resources  Outcome: Met     Problem: Chronic Conditions and Co-morbidities  Goal: Patient's chronic conditions and co-morbidity symptoms are monitored and maintained or improved  Outcome: Met     Problem: Skin  Goal: Prevent/manage excess moisture  Outcome: Met  Goal: Prevent/minimize sheer/friction injuries  Outcome: Met  Goal: Promote/optimize nutrition  Outcome: Met  Goal: Promote skin healing  Outcome: Met     Problem: Diabetes  Goal: Achieve decreasing blood glucose levels by end of shift  Outcome: Progressing     Problem: Fall/Injury  Goal: Not fall by end of shift  Outcome: Met  Goal: Be free from injury by end of the shift  Outcome: Met  Goal: Verbalize understanding of personal risk factors for fall in the hospital  Outcome: Met     Problem: Pain  Goal: Takes deep breaths with improved pain control throughout the shift  Outcome: Met  Goal: Turns in bed with improved pain control throughout the shift  Outcome: Met

## 2025-01-06 NOTE — PROGRESS NOTES
Physical Therapy    Physical Therapy    Physical Therapy Treatment    Patient Name: Dena Short  MRN: [id number]  Today's Date: 1/6/2025  Time Calculation  Start Time: 0934  Stop Time: 1012  Time Calculation (min): 38 min     4114/4114-A    Assessment/Plan   PT Assessment  PT Assessment Results: Decreased strength, Decreased range of motion, Decreased endurance, Impaired balance, Decreased mobility, Decreased safety awareness, Pain, Impaired judgement, Decreased cognition, Orthopedic restrictions  Rehab Prognosis: Good  End of Session Communication: Bedside nurse  End of Session Patient Position: Up in chair, Alarm on  PT Plan  Inpatient/Swing Bed or Outpatient: Inpatient  PT Plan  Treatment/Interventions: Bed mobility, Transfer training, Gait training, Balance training, Neuromuscular re-education, Strengthening, Endurance training, Range of motion, Therapeutic exercise, Therapeutic activity, Home exercise program  PT Plan: Ongoing PT  PT Frequency: Daily  PT Discharge Recommendations: Moderate intensity level of continued care  Equipment Recommended upon Discharge: Wheeled walker  PT Recommended Transfer Status: Assist x2  PT - OK to Discharge: Yes ()     01/06/25 0934   PT  Visit   PT Received On 01/06/25   Response to Previous Treatment Patient with no complaints from previous session.   General   Reason for Referral left hip hemiarthroplasty 12/30   Referred By Dr. Vega   Past Medical History Relevant to Rehab PMH: bradycardia, CAD, cardiomyopathy, CKD, DM, PAD, NSTEMI, cardiac cath, cardioversion, right hip hemiarthropasty   Patient Position Received Bed, 3 rail up;Alarm on   General Comment Patient is agreeable to PT tendancy to use swear words in  casual communication but is not angry or malicious   Pain Assessment   Pain Assessment 0-10   0-10 (Numeric) Pain Score 0 - No pain   Pain Location Hip   Pain Orientation Left   Cognition   Overall Cognitive Status WFL   Therapeutic Exercise   Therapeutic Exercise  Performed Yes   Therapeutic Exercise Activity 1 AP,  QS,  GS,  Heelslides,  LAQ,  ,  Hip Abduction x 20  each B  with emphasis on  L LE  (assist for L hip abduction)   Bed Mobility   Bed Mobility Yes   Bed Mobility 1   Bed Mobility 1 Supine to sitting   Level of Assistance 1 Moderate assistance  (x2)   Ambulation/Gait Training   Ambulation/Gait Training Performed Yes   Ambulation/Gait Training 1   Surface 1 Level tile   Device 1 Rolling walker   Gait Support Devices Gait belt   Assistance 1 Minimum assistance;Moderate assistance  (x2,   1person for chair follow as atient fatigues easily)   Quality of Gait 1 Decreased step length;Inconsistent stride length;Forward flexed posture   Comments/Distance (ft) 1 40  (Patient was fatigued at 40 feet needed cues for sequencing and to keep feet up with the walker assist  given to maneuver the walker at times)   Transfers   Transfer Yes   Transfer 1   Transfer to 1 Stand   Technique 1 Sit to stand;Stand to sit   Transfer Device 1 Walker;Gait belt   Transfer Level of Assistance 1 Minimum assistance;Moderate assistance  (x2, cues for hand placement and safety)   Activity Tolerance   Endurance Tolerates 10 - 20 min exercise with multiple rests   PT Assessment   End of Session Communication Bedside nurse   End of Session Patient Position Up in chair;Alarm on   PT Plan   Inpatient/Swing Bed or Outpatient Inpatient     Outcome Measures:  [facility] Basic Mobility  Turning from your back to your side while in a flat bed without using bedrails: A lot  Moving from lying on your back to sitting on the side of a flat bed without using bedrails: A lot  Moving to and from bed to chair (including a wheelchair): A lot  Standing up from a chair using your arms (e.g. wheelchair or bedside chair): A lot  To walk in hospital room: A lot  Climbing 3-5 steps with railing: A lot  Basic Mobility - Total Score: 12                             EDUCATION:     Education Documentation  No documentation  found.  Education Comments  No comments found.        GOALS:  Encounter Problems       Encounter Problems (Active)       PT Problem       Pt will perform bed mobility with mod A.  (Progressing)       Start:  12/31/24    Expected End:  01/14/25            Pt will complete sit <> stand and bed <> chair transfers with mod A.    (Progressing)       Start:  12/31/24    Expected End:  01/14/25            Pt will ambulate 20 feet mod A using FWW with no significant gait deviations.   (Progressing)       Start:  12/31/24    Expected End:  01/14/25            Pt will progress to completing 3 x 20 supine/seated exercises in order to increase strength and improve gait mechanics.   (Progressing)       Start:  12/31/24    Expected End:  01/14/25               Pain - Adult

## 2025-01-06 NOTE — CARE PLAN
The patient's goals for the shift include  patient will increase activity with PT/OT    The clinical goals for the shift include Patient will have pain managed at a tolerable level this shift. Patient will remain free from falls. Patient will have increased mobility/ROM this shift.

## 2025-01-06 NOTE — PROGRESS NOTES
Dena Short is a 89 y.o. female on day 8 of admission presenting with Closed fracture of left hip, initial encounter.      Subjective   89 y.o. female   PMH; CAD s/p cardiac cath with previous stents, history of NSTEMI, chronic diastolic heart failure [LVEF 55 to 60% in May 2021], paroxysmal atrial fibrillation on Xarelto s/p cardioversion but rate control medications/antiarrhythmics discontinued by patient, history of cardiomyopathy, sinus arrhythmia, PAD s/p stenting right SFA-popliteal artery and angioplasty right PT and peroneal arteries, CKD 4, and IDDM 2    Patient is feeling well, no N/V, no CP or SOB.          Objective          Vitals 24HR  Heart Rate:  [70-84]   Temp:  [36.3 °C (97.3 °F)-37.1 °C (98.8 °F)]   Resp:  [18]   BP: (101-149)/(61-84)   SpO2:  [93 %-96 %]     Intake/Output last 3 Shifts:    Intake/Output Summary (Last 24 hours) at 1/6/2025 1612  Last data filed at 1/6/2025 0400  Gross per 24 hour   Intake --   Output 1000 ml   Net -1000 ml       Physical Exam  GENERAL: Alert, no distress, cooperative  SKIN: Skin color, texture, turgor normal. No rashes or lesions.  EYES: PERRLA, EOMI  HEENT: Head: Normocephalic  Neck: supple and no adenopathy  LUNGS: Lungs clear to auscultation. Good diaphragmatic excursion.  CARDIAC: Normal S1 and S2; no rubs, murmurs, or gallops  ABDOMEN: Abdomen soft, non-tender. BS normal. No masses or organomegaly.  EXTREMITIES: No ulcers, Extremities normal. No deformities, edema, clubbing or skin discoloration.  NEURO: Cranial nerves II-XII intact, no focal deficit.     Relevant Results             Scheduled medications  acetaminophen, 975 mg, oral, q8h  amLODIPine, 5 mg, oral, Daily  brimonidine, 1 drop, Left Eye, BID  cholecalciferol, 5,000 Units, oral, Daily  dorzolamide-timoloL, 1 drop, Left Eye, BID  ferrous sulfate (325 mg ferrous sulfate), 65 mg of iron, oral, BID  furosemide, 40 mg, oral, Daily  insulin lispro, 0-10 Units, subcutaneous, Before meals &  nightly  lidocaine, 2 patch, transdermal, Nightly  losartan, 50 mg, oral, Daily  rivaroxaban, 10 mg, oral, Daily  rosuvastatin, 5 mg, oral, q PM      Continuous medications       PRN medications  PRN medications: dextrose, dextrose, glucagon, glucagon, melatonin, ondansetron, oxyCODONE, polyethylene glycol  Results for orders placed or performed during the hospital encounter of 12/29/24 (from the past 24 hours)   POCT GLUCOSE   Result Value Ref Range    POCT Glucose 239 (H) 74 - 99 mg/dL   POCT GLUCOSE   Result Value Ref Range    POCT Glucose 231 (H) 74 - 99 mg/dL   CBC   Result Value Ref Range    WBC 8.6 4.4 - 11.3 x10*3/uL    nRBC 0.2 (H) 0.0 - 0.0 /100 WBCs    RBC 3.69 (L) 4.00 - 5.20 x10*6/uL    Hemoglobin 9.6 (L) 12.0 - 16.0 g/dL    Hematocrit 32.3 (L) 36.0 - 46.0 %    MCV 88 80 - 100 fL    MCH 26.0 26.0 - 34.0 pg    MCHC 29.7 (L) 32.0 - 36.0 g/dL    RDW 17.2 (H) 11.5 - 14.5 %    Platelets 289 150 - 450 x10*3/uL   Basic metabolic panel   Result Value Ref Range    Glucose 166 (H) 74 - 99 mg/dL    Sodium 137 136 - 145 mmol/L    Potassium 4.4 3.5 - 5.3 mmol/L    Chloride 101 98 - 107 mmol/L    Bicarbonate 28 21 - 32 mmol/L    Anion Gap 12 10 - 20 mmol/L    Urea Nitrogen 27 (H) 6 - 23 mg/dL    Creatinine 1.18 (H) 0.50 - 1.05 mg/dL    eGFR 43 (L) >60 mL/min/1.73m*2    Calcium 8.2 (L) 8.6 - 10.3 mg/dL   POCT GLUCOSE   Result Value Ref Range    POCT Glucose 176 (H) 74 - 99 mg/dL   POCT GLUCOSE   Result Value Ref Range    POCT Glucose 131 (H) 74 - 99 mg/dL   POCT GLUCOSE   Result Value Ref Range    POCT Glucose 157 (H) 74 - 99 mg/dL         Assessment/Plan        Assessment & Plan  Closed fracture of left hip, initial encounter    CKD (chronic kidney disease), stage IV (Multi)    Fall    Elevated troponin level    Neutrophilic leukocytosis      // ALESIA on CKD 3b.   - Baseline Cr; 1.3-1.7 back to 2019 with baseline GFR in the 3B range.   - Cr on admission; 1.35   - Offending meds; hold losartan and lasix during ALESIA.   -  IV contrast; none.   - Hemodynamics; /84 on 12/30, 84/42 on 1/1.   - Check UA; trace blood, trace protein, LE.   - Check urine electrolytes;   - Check UPCR; 0.41   - Check renal US;  medical renal disease (R>L)   - Avoid nephrotoxic medications including NSAIDs and IV contrast if possible.   - Dose medication to eGFR.   - Etiology is likely; hemodynamic with fluctuating BP and decreased renal perfusion with possible ATN.   C/w lasix and losartan.   Repeat labs in 1 week   Follow in office in 2 weeks.      // Proteinuria   - UPCR 0.41    - IDDM     // Anemia of CKD   - Hgb at goal.   - Medications; none.      // CKD MBD.   - PTH. 109   - 25D; 27   - Ca and phos at goal.   - Medications; none.      // Metabolic acidosis   None.      // HTN   // Hypotension.         // CAD s/p cardiac cath with previous stents, history of NSTEMI,   // chronic diastolic heart failure [LVEF 55 to 60% in May 2021],   // paroxysmal atrial fibrillation on Xarelto s/p cardioversion but rate control medications/antiarrhythmics discontinued by patient,   // PAD s/p stenting right SFA-popliteal artery and angioplasty right PT and peroneal arteries,   // IDDM 2        Alejandro Richards MD

## 2025-01-06 NOTE — PROGRESS NOTES
Occupational Therapy    OT Treatment    Patient Name: Dena Short  MRN: [id number]  Today's Date: 1/6/2025  Time Calculation  Start Time: 0949  Stop Time: 1033  Time Calculation (min): 44 min       4114/4114-A    Assessment:  End of Session Communication: Bedside nurse  End of Session Patient Position: Up in chair, Alarm on       Plan:  OT Frequency: 5 times per week  OT Discharge Recommendations: Moderate intensity level of continued care     Subjective      01/06/25 0949   OT Last Visit   OT Received On 01/06/25   General   Reason for Referral recent surgery   Referred By Dr. Vega   Past Medical History Relevant to Rehab To hospital following fall. Pt found to have left hip fracture. Pt underwent left hip hemiarthroplasty 12/20/24. PMH: bradycardia, CAD, cardiomyopathy, CKD, DM, PAD, NSTEMI, cardiac cath, cardioversion, right hip hemiarthropasty   Prior to Session Communication Bedside nurse   Patient Position Received Bed, 3 rail up;Alarm on   General Comment Pt agreeable to tx and cleared for participation.   Precautions   Hearing/Visual Limitations [place], blind in R eye   LE Weight Bearing Status Weight Bearing as Tolerated  (L LE)   Medical Precautions Fall precautions   Post-Surgical Precautions Left hip precautions   Pain Assessment   0-10 (Numeric) Pain Score 0 - No pain   [place] FACES Pain Rating 0   Cognition   Overall Cognitive Status WFL   Grooming   Grooming Level of Assistance Setup;Moderate assistance   Grooming Where Assessed Recliner   Grooming Comments Pt completed oral hygiene while seated with  setup of supplies. Assist provided to thoroughly comb knots out of  hair and place into ponytail. Pt thankful.   UE Bathing   UE Bathing Level of Assistance Minimum assistance   UE Bathing Where Assessed Edge of bed   UE Bathing Comments assist provided to wash back   UE Dressing   UE Dressing Level of Assistance Minimum assistance   UE Dressing Where Assessed Edge of bed   UE Dressing Comments doff/don  cllean gown   Bed Mobility 1   Bed Mobility 1 Supine to sitting   Level of Assistance 1 Moderate assistance;+2   Bed Mobility Comments 1 HOB elevated, use of draw sheet   Functional Mobility   Functional Mobility Performed Pt ambulated within room, fww use and Min A x2 for chair follow, cues for safety and sequencing.   Transfer 1   Transfer From 1 Bed to   Transfer to 1 Stand;Sit   Technique 1 Sit to stand;Stand to sit   Transfer Device 1 Gait belt;Walker   Transfer Level of Assistance 1 Moderate assistance;+2;Minimum assistance   Trials/Comments 1 cues for proper UE/LE placement   Static Sitting Balance   Static Sitting-Balance Support Feet supported;Bilateral upper extremity supported   Static Sitting-Level of Assistance Close supervision   Static Standing Balance   Static Standing-Balance Support Bilateral upper extremity supported   Static Standing-Level of Assistance Minimum assistance  (x2)   Dynamic Standing Balance   Dynamic Standing-Balance Support Bilateral upper extremity supported   Dynamic Standing-Level of Assistance Minimum assistance  (x2)   Dynamic Standing-Balance Turning   IP OT Assessment   End of Session Communication Bedside nurse   End of Session Patient Position Up in chair;Alarm on   Inpatient Plan   OT Frequency 5 times per week   OT Discharge Recommendations Moderate intensity level of continued care     Outcome Measures:[facility] Daily Activity  Putting on and taking off regular lower body clothing: A lot  Bathing (including washing, rinsing, drying): A lot  Putting on and taking off regular upper body clothing: A little  Toileting, which includes using toilet, bedpan or urinal: Total  Taking care of personal grooming such as brushing teeth: A lot  Eating Meals: A little  Daily Activity - Total Score: 13  Education Documentation  No documentation found.  Education Comments  No comments found.            Goals:  Encounter Problems       Encounter Problems (Active)       OT Goals       Patient  will complete UE adls with MOD I. (Progressing)       Start:  12/31/24    Expected End:  01/14/25            Patient will complete LE adls with MOD I. (Progressing)       Start:  12/31/24    Expected End:  01/14/25            Patient will complete transfers with MOD I. (Progressing)       Start:  12/31/24    Expected End:  01/14/25            Patient will complete functional mobility tasks with MOD I. (Progressing)       Start:  12/31/24    Expected End:  01/14/25

## 2025-01-06 NOTE — PROGRESS NOTES
Dena Short is a 89 y.o. female on day 8 of admission presenting with Closed fracture of left hip, initial encounter.      Subjective   89 y.o. female   PMH; CAD s/p cardiac cath with previous stents, history of NSTEMI, chronic diastolic heart failure [LVEF 55 to 60% in May 2021], paroxysmal atrial fibrillation on Xarelto s/p cardioversion but rate control medications/antiarrhythmics discontinued by patient, history of cardiomyopathy, sinus arrhythmia, PAD s/p stenting right SFA-popliteal artery and angioplasty right PT and peroneal arteries, CKD 4, and IDDM 2    Patient is feeling well, no N/V, no CP or SOB.          Objective          Vitals 24HR  Heart Rate:  [66-84]   Temp:  [36.7 °C (98.1 °F)-37.1 °C (98.8 °F)]   Resp:  [18]   BP: (101-150)/(61-84)   SpO2:  [93 %-96 %]     Intake/Output last 3 Shifts:    Intake/Output Summary (Last 24 hours) at 1/6/2025 1235  Last data filed at 1/6/2025 0400  Gross per 24 hour   Intake --   Output 1000 ml   Net -1000 ml       Physical Exam  GENERAL: Alert, no distress, cooperative  SKIN: Skin color, texture, turgor normal. No rashes or lesions.  EYES: PERRLA, EOMI  HEENT: Head: Normocephalic  Neck: supple and no adenopathy  LUNGS: Lungs clear to auscultation. Good diaphragmatic excursion.  CARDIAC: Normal S1 and S2; no rubs, murmurs, or gallops  ABDOMEN: Abdomen soft, non-tender. BS normal. No masses or organomegaly.  EXTREMITIES: No ulcers, Extremities normal. No deformities, edema, clubbing or skin discoloration.  NEURO: Cranial nerves II-XII intact, no focal deficit.     Relevant Results             Scheduled medications  acetaminophen, 975 mg, oral, q8h  amLODIPine, 5 mg, oral, Daily  brimonidine, 1 drop, Left Eye, BID  cholecalciferol, 5,000 Units, oral, Daily  dorzolamide-timoloL, 1 drop, Left Eye, BID  ferrous sulfate (325 mg ferrous sulfate), 65 mg of iron, oral, BID  furosemide, 40 mg, oral, Daily  insulin lispro, 0-10 Units, subcutaneous, Before meals &  nightly  lidocaine, 2 patch, transdermal, Nightly  losartan, 50 mg, oral, Daily  rivaroxaban, 10 mg, oral, Daily  rosuvastatin, 5 mg, oral, q PM      Continuous medications       PRN medications  PRN medications: dextrose, dextrose, glucagon, glucagon, melatonin, ondansetron, oxyCODONE, polyethylene glycol  Results for orders placed or performed during the hospital encounter of 12/29/24 (from the past 24 hours)   POCT GLUCOSE   Result Value Ref Range    POCT Glucose 239 (H) 74 - 99 mg/dL   POCT GLUCOSE   Result Value Ref Range    POCT Glucose 231 (H) 74 - 99 mg/dL   CBC   Result Value Ref Range    WBC 8.6 4.4 - 11.3 x10*3/uL    nRBC 0.2 (H) 0.0 - 0.0 /100 WBCs    RBC 3.69 (L) 4.00 - 5.20 x10*6/uL    Hemoglobin 9.6 (L) 12.0 - 16.0 g/dL    Hematocrit 32.3 (L) 36.0 - 46.0 %    MCV 88 80 - 100 fL    MCH 26.0 26.0 - 34.0 pg    MCHC 29.7 (L) 32.0 - 36.0 g/dL    RDW 17.2 (H) 11.5 - 14.5 %    Platelets 289 150 - 450 x10*3/uL   Basic metabolic panel   Result Value Ref Range    Glucose 166 (H) 74 - 99 mg/dL    Sodium 137 136 - 145 mmol/L    Potassium 4.4 3.5 - 5.3 mmol/L    Chloride 101 98 - 107 mmol/L    Bicarbonate 28 21 - 32 mmol/L    Anion Gap 12 10 - 20 mmol/L    Urea Nitrogen 27 (H) 6 - 23 mg/dL    Creatinine 1.18 (H) 0.50 - 1.05 mg/dL    eGFR 43 (L) >60 mL/min/1.73m*2    Calcium 8.2 (L) 8.6 - 10.3 mg/dL   POCT GLUCOSE   Result Value Ref Range    POCT Glucose 176 (H) 74 - 99 mg/dL   POCT GLUCOSE   Result Value Ref Range    POCT Glucose 131 (H) 74 - 99 mg/dL         Assessment/Plan        Assessment & Plan  Closed fracture of left hip, initial encounter    CKD (chronic kidney disease), stage IV (Multi)    Fall    Elevated troponin level    Neutrophilic leukocytosis      // ALESIA on CKD 3b.   - Baseline Cr; 1.3-1.7 back to 2019 with baseline GFR in the 3B range.   - Cr on admission; 1.35   - Offending meds; hold losartan and lasix during ALESIA.   - IV contrast; none.   - Hemodynamics; /84 on 12/30, 84/42 on 1/1.   -  Check UA; trace blood, trace protein, LE.   - Check urine electrolytes;   - Check UPCR; 0.41   - Check renal US;  medical renal disease (R>L)   - Avoid nephrotoxic medications including NSAIDs and IV contrast if possible.   - Dose medication to eGFR.   - Etiology is likely; hemodynamic with fluctuating BP and decreased renal perfusion with possible ATN.   Ok to resume lasix and losartan.   Repeat labs in 1 week   Follow in office in 2 weeks.   Ok to dc from renal standpoint.      // Proteinuria   - UPCR 0.41    - IDDM     // Anemia of CKD   - Hgb at goal.   - Medications; none.      // CKD MBD.   - PTH. 109   - 25D; 27   - Ca and phos at goal.   - Medications; none.      // Metabolic acidosis   None.      // HTN   // Hypotension.         // CAD s/p cardiac cath with previous stents, history of NSTEMI,   // chronic diastolic heart failure [LVEF 55 to 60% in May 2021],   // paroxysmal atrial fibrillation on Xarelto s/p cardioversion but rate control medications/antiarrhythmics discontinued by patient,   // PAD s/p stenting right SFA-popliteal artery and angioplasty right PT and peroneal arteries,   // IDDM 2        Alejandro Richards MD

## 2025-01-07 ENCOUNTER — HOSPITAL ENCOUNTER (OUTPATIENT)
Dept: CARDIOLOGY | Facility: HOSPITAL | Age: OVER 89
Discharge: HOME | DRG: 522 | End: 2025-01-07
Payer: MEDICARE

## 2025-01-07 VITALS
DIASTOLIC BLOOD PRESSURE: 60 MMHG | TEMPERATURE: 97.3 F | BODY MASS INDEX: 31.6 KG/M2 | WEIGHT: 160.94 LBS | RESPIRATION RATE: 18 BRPM | OXYGEN SATURATION: 97 % | HEART RATE: 66 BPM | SYSTOLIC BLOOD PRESSURE: 128 MMHG | HEIGHT: 60 IN

## 2025-01-07 LAB
ANION GAP SERPL CALC-SCNC: 14 MMOL/L (ref 10–20)
BUN SERPL-MCNC: 24 MG/DL (ref 6–23)
CALCIUM SERPL-MCNC: 8.3 MG/DL (ref 8.6–10.3)
CHLORIDE SERPL-SCNC: 101 MMOL/L (ref 98–107)
CO2 SERPL-SCNC: 26 MMOL/L (ref 21–32)
CREAT SERPL-MCNC: 1.15 MG/DL (ref 0.5–1.05)
EGFRCR SERPLBLD CKD-EPI 2021: 45 ML/MIN/1.73M*2
ERYTHROCYTE [DISTWIDTH] IN BLOOD BY AUTOMATED COUNT: 18.1 % (ref 11.5–14.5)
GLUCOSE BLD MANUAL STRIP-MCNC: 135 MG/DL (ref 74–99)
GLUCOSE BLD MANUAL STRIP-MCNC: 222 MG/DL (ref 74–99)
GLUCOSE SERPL-MCNC: 114 MG/DL (ref 74–99)
HCT VFR BLD AUTO: 33.4 % (ref 36–46)
HGB BLD-MCNC: 10.2 G/DL (ref 12–16)
MCH RBC QN AUTO: 27.2 PG (ref 26–34)
MCHC RBC AUTO-ENTMCNC: 30.5 G/DL (ref 32–36)
MCV RBC AUTO: 89 FL (ref 80–100)
NRBC BLD-RTO: 0 /100 WBCS (ref 0–0)
PLATELET # BLD AUTO: 311 X10*3/UL (ref 150–450)
POTASSIUM SERPL-SCNC: 4.1 MMOL/L (ref 3.5–5.3)
RBC # BLD AUTO: 3.75 X10*6/UL (ref 4–5.2)
SODIUM SERPL-SCNC: 137 MMOL/L (ref 136–145)
WBC # BLD AUTO: 8.9 X10*3/UL (ref 4.4–11.3)

## 2025-01-07 PROCEDURE — 82947 ASSAY GLUCOSE BLOOD QUANT: CPT

## 2025-01-07 PROCEDURE — 2500000001 HC RX 250 WO HCPCS SELF ADMINISTERED DRUGS (ALT 637 FOR MEDICARE OP): Performed by: INTERNAL MEDICINE

## 2025-01-07 PROCEDURE — 2500000002 HC RX 250 W HCPCS SELF ADMINISTERED DRUGS (ALT 637 FOR MEDICARE OP, ALT 636 FOR OP/ED): Performed by: NURSE PRACTITIONER

## 2025-01-07 PROCEDURE — 93005 ELECTROCARDIOGRAM TRACING: CPT

## 2025-01-07 PROCEDURE — 2500000001 HC RX 250 WO HCPCS SELF ADMINISTERED DRUGS (ALT 637 FOR MEDICARE OP): Performed by: NURSE PRACTITIONER

## 2025-01-07 PROCEDURE — 36415 COLL VENOUS BLD VENIPUNCTURE: CPT | Performed by: NURSE PRACTITIONER

## 2025-01-07 PROCEDURE — 80048 BASIC METABOLIC PNL TOTAL CA: CPT | Performed by: NURSE PRACTITIONER

## 2025-01-07 PROCEDURE — 85027 COMPLETE CBC AUTOMATED: CPT | Performed by: NURSE PRACTITIONER

## 2025-01-07 RX ADMIN — CHOLECALCIFEROL TAB 125 MCG (5000 UNIT) 5000 UNITS: 125 TAB at 09:11

## 2025-01-07 RX ADMIN — AMLODIPINE BESYLATE 5 MG: 5 TABLET ORAL at 09:11

## 2025-01-07 RX ADMIN — BRIMONIDINE TARTRATE 1 DROP: 2 SOLUTION/ DROPS OPHTHALMIC at 09:13

## 2025-01-07 RX ADMIN — DORZOLAMIDE HYDROCHLORIDE AND TIMOLOL MALEATE 1 DROP: 20; 5 SOLUTION/ DROPS OPHTHALMIC at 09:12

## 2025-01-07 RX ADMIN — FUROSEMIDE 40 MG: 40 TABLET ORAL at 09:12

## 2025-01-07 RX ADMIN — LOSARTAN POTASSIUM 50 MG: 50 TABLET, FILM COATED ORAL at 09:11

## 2025-01-07 RX ADMIN — ACETAMINOPHEN 975 MG: 325 TABLET ORAL at 05:42

## 2025-01-07 RX ADMIN — FERROUS SULFATE TAB 325 MG (65 MG ELEMENTAL FE) 325 MG: 325 (65 FE) TAB at 09:12

## 2025-01-07 RX ADMIN — INSULIN LISPRO 4 UNITS: 100 INJECTION, SOLUTION INTRAVENOUS; SUBCUTANEOUS at 13:50

## 2025-01-07 RX ADMIN — ACETAMINOPHEN 975 MG: 325 TABLET ORAL at 13:50

## 2025-01-07 ASSESSMENT — PAIN DESCRIPTION - ORIENTATION: ORIENTATION: LEFT

## 2025-01-07 ASSESSMENT — PAIN SCALES - GENERAL
PAINLEVEL_OUTOF10: 4
PAINLEVEL_OUTOF10: 3

## 2025-01-07 ASSESSMENT — PAIN DESCRIPTION - LOCATION: LOCATION: HIP

## 2025-01-07 NOTE — NURSING NOTE
Pt for discharge to [facility] [place]. Mepilex to coccyx for protection. Silver mepilex to left hip. Mepilex to left heel for dime sized pressure ulcer. Heels elevated. Mepilex to left forearm skin tear. Iniguez intact for retention. Report called to abner at [facility] [place].

## 2025-01-07 NOTE — PROGRESS NOTES
01/07/25 1107   Discharge Planning   Home or Post Acute Services Post acute facilities (Rehab/SNF/etc)   Type of Post Acute Facility Services Skilled nursing   Expected Discharge Disposition   ([facility])   Has discharge transport been arranged? No   What day is the transport expected? 01/07/25   What time is the transport expected? 1230     Pre-cert received for transfer to the [facility] SNF. Pt aware and in agreement with the plan. Bedside nurse and facility updated with discharge and time. Nursing will call report prior to discharge.

## 2025-01-07 NOTE — CARE PLAN
The patient's goals for the shift include      The clinical goals for the shift include pain management, agree to repostioning

## 2025-01-08 LAB
ATRIAL RATE: 375 BPM
Q ONSET: 220 MS
QRS COUNT: 16 BEATS
QRS DURATION: 92 MS
QT INTERVAL: 354 MS
QTC CALCULATION(BAZETT): 451 MS
QTC FREDERICIA: 417 MS
R AXIS: 44 DEGREES
T AXIS: -13 DEGREES
T OFFSET: 397 MS
VENTRICULAR RATE: 98 BPM

## 2025-02-04 ENCOUNTER — APPOINTMENT (OUTPATIENT)
Dept: CARDIOLOGY | Facility: CLINIC | Age: OVER 89
End: 2025-02-04
Payer: MEDICARE

## 2025-02-11 ENCOUNTER — APPOINTMENT (OUTPATIENT)
Dept: CARDIOLOGY | Facility: CLINIC | Age: OVER 89
End: 2025-02-11
Payer: MEDICARE

## 2025-03-07 DIAGNOSIS — I48.91 ATRIAL FIBRILLATION, UNSPECIFIED TYPE (MULTI): ICD-10-CM

## 2025-03-07 DIAGNOSIS — I49.9 CARDIAC ARRHYTHMIA, UNSPECIFIED CARDIAC ARRHYTHMIA TYPE: ICD-10-CM

## 2025-03-07 RX ORDER — AMIODARONE HYDROCHLORIDE 200 MG/1
200 TABLET ORAL DAILY
COMMUNITY
End: 2025-03-07 | Stop reason: SDUPTHER

## 2025-03-07 NOTE — TELEPHONE ENCOUNTER
Pharmacy called; The amiodarone was not on the list. I added it back on. It was removed by [facility] ?   Last OV 6/2024. No F/U

## 2025-03-08 RX ORDER — AMIODARONE HYDROCHLORIDE 200 MG/1
200 TABLET ORAL DAILY
Qty: 90 TABLET | Refills: 0 | Status: SHIPPED | OUTPATIENT
Start: 2025-03-08

## 2025-05-17 DIAGNOSIS — I48.91 ATRIAL FIBRILLATION, UNSPECIFIED TYPE (MULTI): ICD-10-CM

## 2025-05-17 DIAGNOSIS — I49.9 CARDIAC ARRHYTHMIA, UNSPECIFIED CARDIAC ARRHYTHMIA TYPE: ICD-10-CM

## 2025-05-19 RX ORDER — AMIODARONE HYDROCHLORIDE 200 MG/1
200 TABLET ORAL DAILY
Qty: 90 TABLET | Refills: 0 | Status: SHIPPED | OUTPATIENT
Start: 2025-05-19

## 2025-05-19 NOTE — TELEPHONE ENCOUNTER
I called the patient to get her schedule for a  follow-up. She will call back, she is having issues with transportation.

## 2025-05-22 ENCOUNTER — TELEPHONE (OUTPATIENT)
Dept: CARDIOLOGY | Facility: CLINIC | Age: OVER 89
End: 2025-05-22
Payer: MEDICARE

## 2025-08-29 DIAGNOSIS — I48.91 ATRIAL FIBRILLATION, UNSPECIFIED TYPE (MULTI): ICD-10-CM

## 2025-08-29 DIAGNOSIS — I49.9 CARDIAC ARRHYTHMIA, UNSPECIFIED CARDIAC ARRHYTHMIA TYPE: ICD-10-CM

## 2025-08-29 RX ORDER — AMIODARONE HYDROCHLORIDE 200 MG/1
200 TABLET ORAL DAILY
Qty: 30 TABLET | Refills: 0 | Status: SHIPPED | OUTPATIENT
Start: 2025-08-29

## (undated) DEVICE — TIP, SUCTION, YANKAUER, FLEXIBLE

## (undated) DEVICE — GLOVE, SURGICAL, PROTEXIS PI , 7.5, PF, LF

## (undated) DEVICE — DRAPE, TAPE, ORTHO

## (undated) DEVICE — SUTURE, STRATAFIX, 3-0, SPIRAL MONOCRYL PLUS, PS, 70CM, UNDYED

## (undated) DEVICE — DRESSING, MEPILEX BORDER, POST-OP AG, 4 X 12 IN

## (undated) DEVICE — SUTURE, VICRYL, 2-0, 36 IN, CT-1, UNDYED

## (undated) DEVICE — ELECTRODE, ELECTROSURGICAL, BLADE, EXTENDED

## (undated) DEVICE — COVER HANDLE LIGHT, STERIS, BLUE, STERILE

## (undated) DEVICE — HOOD, SURGICAL, FLYTE SURGICOOL

## (undated) DEVICE — PILLOW, ABDUCTION, MEDIUM

## (undated) DEVICE — DRAPE, INSTRUMENT, W/POUCH, STERI DRAPE, 7 X 11 IN, DISPOSABLE, STERILE

## (undated) DEVICE — SUTURE, STARTAFIX, SYMMETRIC, PDS PLUS, 60CM CT-1

## (undated) DEVICE — CLOSURE SYSTEM, DERMABOND, PRINEO, 22CM, STERILE

## (undated) DEVICE — TOWEL PACK, STERILE, 4/PACK, BLUE

## (undated) DEVICE — SEALER, BIPOLAR, AQUA MANTYS 6.0

## (undated) DEVICE — DRAPE, SHEET, THREE QUARTER, FAN FOLD, 57 X 77 IN

## (undated) DEVICE — SUTURE, VICRYL PLUS, 1, 8X27IN, CT-1, CR, UND, BRAIDED

## (undated) DEVICE — SOLUTION, IRRIGATION, SODIUM CHLORIDE 0.9%, 1000 ML, POUR BOTTLE

## (undated) DEVICE — BANDAGE, COFLEX, 6 X 5 YDS, FOAM TAN, STERILE, LF

## (undated) DEVICE — APPLICATOR, CHLORAPREP, W/ORANGE TINT, 26ML

## (undated) DEVICE — Device

## (undated) DEVICE — GOWN, ISOLATION, IMPERVIOUS, XLARGE, LF, BLUE

## (undated) DEVICE — GLOVE, SURGICAL, PROTEXIS PI BLUE W/NEUTHERA, 8.0, PF, LF

## (undated) DEVICE — GLOVE, SURGICAL, PROTEXIS PI W/NEU-THERA, 8.0, PF, LF

## (undated) DEVICE — BLADE, SAGITTAL, THIN, SHORT, LF

## (undated) DEVICE — SOLUTION, IRRIGATION, STERILE WATER, 1000 ML, POUR BOTTLE